# Patient Record
Sex: MALE | Race: WHITE | Employment: OTHER | URBAN - METROPOLITAN AREA
[De-identification: names, ages, dates, MRNs, and addresses within clinical notes are randomized per-mention and may not be internally consistent; named-entity substitution may affect disease eponyms.]

---

## 2017-04-12 ENCOUNTER — HOSPITAL ENCOUNTER (OUTPATIENT)
Dept: CT IMAGING | Age: 82
Discharge: HOME | End: 2017-04-12
Attending: INTERNAL MEDICINE
Payer: MEDICARE

## 2017-04-12 DIAGNOSIS — C34.91 ADENOCARCINOMA OF LUNG, RIGHT (HCC): ICD-10-CM

## 2017-04-12 DIAGNOSIS — C34.11 MALIGNANT NEOPLASM OF UPPER LOBE OF RIGHT LUNG (HCC): ICD-10-CM

## 2017-04-12 PROCEDURE — 74150 CT ABDOMEN W/O CONTRAST: CPT

## 2017-04-12 PROCEDURE — 74011000255 HC RX REV CODE- 255: Performed by: INTERNAL MEDICINE

## 2017-04-12 PROCEDURE — 71250 CT THORAX DX C-: CPT

## 2017-04-12 RX ORDER — SODIUM CHLORIDE 9 MG/ML
50 INJECTION, SOLUTION INTRAVENOUS
Status: DISCONTINUED | OUTPATIENT
Start: 2017-04-12 | End: 2017-04-12

## 2017-04-12 RX ORDER — SODIUM CHLORIDE 0.9 % (FLUSH) 0.9 %
10 SYRINGE (ML) INJECTION
Status: DISCONTINUED | OUTPATIENT
Start: 2017-04-12 | End: 2017-04-12

## 2017-04-12 RX ORDER — BARIUM SULFATE 20 MG/ML
900 SUSPENSION ORAL
Status: COMPLETED | OUTPATIENT
Start: 2017-04-12 | End: 2017-04-12

## 2017-04-12 RX ADMIN — BARIUM SULFATE 900 ML: 21 SUSPENSION ORAL at 09:00

## 2017-05-15 ENCOUNTER — HOSPITAL ENCOUNTER (OUTPATIENT)
Dept: CT IMAGING | Age: 82
Discharge: HOME | End: 2017-05-15
Attending: SPECIALIST

## 2017-05-15 DIAGNOSIS — K86.2 CYST OF PANCREAS: ICD-10-CM

## 2017-05-15 DIAGNOSIS — K86.9 DISORDER OF PANCREAS: ICD-10-CM

## 2017-05-15 DIAGNOSIS — R93.89 ABNORMAL CT SCAN: ICD-10-CM

## 2017-08-04 RX ORDER — POLYETHYLENE GLYCOL 3350, SODIUM SULFATE, SODIUM CHLORIDE, POTASSIUM CHLORIDE, ASCORBIC ACID, SODIUM ASCORBATE 7.5-2.691G
KIT ORAL
COMMUNITY
Start: 2017-07-25 | End: 2017-11-03

## 2017-08-07 ENCOUNTER — ANESTHESIA EVENT (OUTPATIENT)
Dept: ENDOSCOPY | Age: 82
End: 2017-08-07
Payer: MEDICARE

## 2017-08-07 NOTE — ANESTHESIA PREPROCEDURE EVALUATION
Anesthetic History   No history of anesthetic complications            Review of Systems / Medical History  Patient summary reviewed, nursing notes reviewed and pertinent labs reviewed    Pulmonary    COPD    Sleep apnea  Shortness of breath         Neuro/Psych         Psychiatric history    Comments: Depression Cardiovascular    Hypertension        Dysrhythmias : atrial fibrillation  Pacemaker, CAD and hyperlipidemia    Exercise tolerance: <4 METS  Comments: Electronic atrial pacemaker/defibrillator  Right bundle branch block  Left anterior fascicular block  ** Bifascicular block **     GI/Hepatic/Renal         Renal disease: CRI      Comments: Hx PANCREATIC LESION Endo/Other    Diabetes  Hypothyroidism  Obesity and anemia     Other Findings            Physical Exam    Airway  Mallampati: I    Neck ROM: normal range of motion   Mouth opening: Normal     Cardiovascular  Regular rate and rhythm,  S1 and S2 normal,  no murmur, click, rub, or gallop             Dental    Dentition: Lower partial plate and Caps/crowns     Pulmonary  Breath sounds clear to auscultation               Abdominal  GI exam deferred       Other Findings            Anesthetic Plan    ASA: 4  Anesthesia type: total IV anesthesia          Induction: Intravenous  Anesthetic plan and risks discussed with: Patient

## 2017-08-08 ENCOUNTER — ANESTHESIA (OUTPATIENT)
Dept: ENDOSCOPY | Age: 82
End: 2017-08-08
Payer: MEDICARE

## 2017-08-08 ENCOUNTER — HOSPITAL ENCOUNTER (OUTPATIENT)
Age: 82
Setting detail: OUTPATIENT SURGERY
Discharge: HOME | End: 2017-08-08
Attending: SPECIALIST | Admitting: SPECIALIST
Payer: MEDICARE

## 2017-08-08 VITALS
BODY MASS INDEX: 34.14 KG/M2 | OXYGEN SATURATION: 99 % | HEART RATE: 81 BPM | HEIGHT: 67 IN | TEMPERATURE: 98 F | DIASTOLIC BLOOD PRESSURE: 79 MMHG | WEIGHT: 217.5 LBS | SYSTOLIC BLOOD PRESSURE: 108 MMHG | RESPIRATION RATE: 30 BRPM

## 2017-08-08 PROCEDURE — 88305 TISSUE EXAM BY PATHOLOGIST: CPT | Performed by: SPECIALIST

## 2017-08-08 PROCEDURE — 76060000031 HC ANESTHESIA FIRST 0.5 HR: Performed by: SPECIALIST

## 2017-08-08 PROCEDURE — 74011250636 HC RX REV CODE- 250/636

## 2017-08-08 PROCEDURE — 74011250636 HC RX REV CODE- 250/636: Performed by: SPECIALIST

## 2017-08-08 PROCEDURE — 76040000019: Performed by: SPECIALIST

## 2017-08-08 PROCEDURE — 77030013992 HC SNR POLYP ENDOSC BSC -B: Performed by: SPECIALIST

## 2017-08-08 RX ORDER — DEXTROMETHORPHAN/PSEUDOEPHED 2.5-7.5/.8
1.2 DROPS ORAL
Status: DISCONTINUED | OUTPATIENT
Start: 2017-08-08 | End: 2017-08-08 | Stop reason: HOSPADM

## 2017-08-08 RX ORDER — SODIUM CHLORIDE 9 MG/ML
50 INJECTION, SOLUTION INTRAVENOUS CONTINUOUS
Status: DISCONTINUED | OUTPATIENT
Start: 2017-08-08 | End: 2017-08-08 | Stop reason: HOSPADM

## 2017-08-08 RX ORDER — SODIUM CHLORIDE 0.9 % (FLUSH) 0.9 %
5-10 SYRINGE (ML) INJECTION EVERY 8 HOURS
Status: DISCONTINUED | OUTPATIENT
Start: 2017-08-08 | End: 2017-08-08 | Stop reason: HOSPADM

## 2017-08-08 RX ORDER — SODIUM CHLORIDE 0.9 % (FLUSH) 0.9 %
5-10 SYRINGE (ML) INJECTION AS NEEDED
Status: DISCONTINUED | OUTPATIENT
Start: 2017-08-08 | End: 2017-08-08 | Stop reason: HOSPADM

## 2017-08-08 RX ORDER — MIDAZOLAM HYDROCHLORIDE 1 MG/ML
1-2 INJECTION, SOLUTION INTRAMUSCULAR; INTRAVENOUS
Status: DISCONTINUED | OUTPATIENT
Start: 2017-08-08 | End: 2017-08-08 | Stop reason: HOSPADM

## 2017-08-08 RX ORDER — PROPOFOL 10 MG/ML
INJECTION, EMULSION INTRAVENOUS AS NEEDED
Status: DISCONTINUED | OUTPATIENT
Start: 2017-08-08 | End: 2017-08-08 | Stop reason: HOSPADM

## 2017-08-08 RX ADMIN — PROPOFOL 20 MG: 10 INJECTION, EMULSION INTRAVENOUS at 07:45

## 2017-08-08 RX ADMIN — PROPOFOL 20 MG: 10 INJECTION, EMULSION INTRAVENOUS at 07:58

## 2017-08-08 RX ADMIN — PROPOFOL 30 MG: 10 INJECTION, EMULSION INTRAVENOUS at 07:49

## 2017-08-08 RX ADMIN — PROPOFOL 50 MG: 10 INJECTION, EMULSION INTRAVENOUS at 07:43

## 2017-08-08 RX ADMIN — SODIUM CHLORIDE 50 ML/HR: 900 INJECTION, SOLUTION INTRAVENOUS at 07:30

## 2017-08-08 RX ADMIN — SODIUM CHLORIDE 50 ML/HR: 900 INJECTION, SOLUTION INTRAVENOUS at 08:26

## 2017-08-08 RX ADMIN — PROPOFOL 20 MG: 10 INJECTION, EMULSION INTRAVENOUS at 07:55

## 2017-08-08 NOTE — IP AVS SNAPSHOT
[address] [facility] 
[phone] Patient: Kaiden Loya. MRN: [id number] VYR:8/10/[birth year]7 You are allergic to the following No active allergies Recent Documentation Height Weight BMI Smoking Status 1.702 m 98.7 kg 34.07 kg/m2 Former Smoker Emergency Contacts Name Discharge Info Relation Home Work Mobile [facility] DISCHARGE CAREGIVER [3] Daughter [21] 31 [phone] Anita Hayden DISCHARGE CAREGIVER [3] Spouse [3] [phone] About your hospitalization You were admitted on:  August 8, 2017 You last received care in the:  [facility] You were discharged on:  August 8, 2017 Unit phone number:  [phone] Why you were hospitalized Your primary diagnosis was:  Not on File Providers Seen During Your Hospitalizations Provider Role Specialty Primary office phone Jordan Echeverria MD Attending Provider Gastroenterology [phone] Your Primary Care Physician (PCP) Primary Care Physician Office Phone Office Fax [employer] [phone] Follow-up Information None Current Discharge Medication List  
  
CONTINUE these medications which have NOT CHANGED Dose & Instructions Dispensing Information Comments Morning Noon Evening Bedtime ACCU-CHEK LAMONTE PLUS TEST STRP strip Generic drug:  glucose blood VI test strips Your last dose was: Your next dose is:    
   
   
  Refills:  0  
     
   
   
   
  
 apixaban 5 mg tablet Commonly known as:  Pablo Angeles Your last dose was: Your next dose is:    
   
   
 Dose:  5 mg Take 1 Tab by mouth two (2) times a day. Quantity:  60 Tab Refills:  3  
     
   
   
   
  
 bumetanide 0.5 mg tablet Commonly known as:  Londell Dancer Your last dose was: Your next dose is: Refills:  0  
     
   
   
   
  
 colchicine 0.6 mg tablet Your last dose was: Your next dose is:    
   
   
 Dose:  0.6 mg Take 0.6 mg by mouth daily. Refills:  0  
     
   
   
   
  
 ezetimibe 10 mg tablet Commonly known as:  Starr Main Your last dose was: Your next dose is:    
   
   
 take 1 tablet by mouth once daily Quantity:  30 Tab Refills:  1 FLOMAX 0.4 mg capsule Generic drug:  tamsulosin Your last dose was: Your next dose is:    
   
   
 Dose:  0.4 mg Take 0.4 mg by mouth daily. Refills:  0 JANUVIA 50 mg tablet Generic drug:  SITagliptin Your last dose was: Your next dose is:    
   
   
 Dose:  50 mg Take 50 mg by mouth daily. Refills:  0 LEXAPRO 20 mg tablet Generic drug:  escitalopram oxalate Your last dose was: Your next dose is:    
   
   
 Dose:  20 mg Take 20 mg by mouth daily. Refills:  0 LIPITOR 20 mg tablet Generic drug:  atorvastatin Your last dose was: Your next dose is: Take  by mouth daily. Refills:  0  
     
   
   
   
  
 losartan 25 mg tablet Commonly known as:  COZAAR Your last dose was: Your next dose is:    
   
   
 Dose:  12.5 mg Take 0.5 Tabs by mouth daily. Quantity:  15 Tab Refills:  0  
     
   
   
   
  
 metoprolol succinate 25 mg XL tablet Commonly known as:  TOPROL-XL Your last dose was: Your next dose is:    
   
   
  Refills:  0 MOVIPREP 100-7.5-2.691 gram Pwpk oral solution Generic drug:  peg 3350-Electrolytes-Vit C Your last dose was: Your next dose is:    
   
   
  Refills:  0  
     
   
   
   
  
 SYNTHROID 150 mcg tablet Generic drug:  levothyroxine Your last dose was: Your next dose is:    
   
   
 Dose:  150 mcg 150 mcg daily. Refills:  0  
     
   
   
   
  
 XANAX 0.25 mg tablet Generic drug:  ALPRAZolam  
   
Your last dose was: Your next dose is: Take  by mouth nightly as needed. Refills:  0 Discharge Instructions Nilesh Chua. 
[id number] 
1935 COLON DISCHARGE INSTRUCTIONS Discomfort: 
Redness at IV site- apply warm compress to area; if redness or soreness persist- contact your physician There may be a slight amount of blood passed from the rectum Gaseous discomfort- walking, belching will help relieve any discomfort You may not operate a vehicle for 12 hours You may not engage in an occupation involving machinery or appliances for rest of today You may not drink alcoholic beverages for at least 12 hours Avoid making any critical decisions for at least 24 hour DIET: 
 Regular diet.  however -  remember your colon is empty and a heavy meal will produce gas. Avoid these foods:  vegetables, fried / greasy foods, carbonated drinks for today. MEDICATIONS: 
  
 
 Regarding Aspirin or Nonsteroidal medications, please see below. ACTIVITY: 
You may resume your normal daily activities it is recommended that you spend the remainder of the day resting -  avoid any strenuous activity. CALL M.D. ANY SIGN OF: Increasing pain, nausea, vomiting Abdominal distension (swelling) New increased bleeding (oral or rectal) Fever (chills) Tylenol as needed for pain. Follow-up Instructions: 
 Call Dr. Christy Hyde for results of procedure / biopsy in 4-5 days at telephone #  [phone] Repeat Colonoscopy 3 years Resume Eliquis in 2 days. [employer] Activation Thank you for requesting access to [employer]. Please follow the instructions below to securely access and download your online medical record.  [employer] allows you to send messages to your doctor, view your test results, renew your prescriptions, schedule appointments, and more. How Do I Sign Up? 1. In your internet browser, go to [email or web address] 
2. Click on the First Time User? Click Here link in the Sign In box. You will be redirect to the New Member Sign Up page. 3. Enter your Pop[employer] Access Code exactly as it appears below. You will not need to use this code after youve completed the sign-up process. If you do not sign up before the expiration date, you must request a new code. [employer] Access Code: [email or web address] Expires: 2017  6:28 AM (This is the date your [employer] access code will ) 4. Enter the last four digits of your Social Security Number (xxxx) and Date of Birth (mm/dd/yyyy) as indicated and click Submit. You will be taken to the next sign-up page. 5. Create a Popdeem ID. This will be your Pop[employer] login ID and cannot be changed, so think of one that is secure and easy to remember. 6. Create a Pop[employer] password. You can change your password at any time. 7. Enter your Password Reset Question and Answer. This can be used at a later time if you forget your password. 8. Enter your e-mail address. You will receive e-mail notification when new information is available in [address] 9. Click Sign Up. You can now view and download portions of your medical record. 10. Click the Download Summary menu link to download a portable copy of your medical information. Additional Information If you have questions, please visit the Frequently Asked Questions section of the [employer] website at [email or web address] Remember, [employer] is NOT to be used for urgent needs. For medical emergencies, dial 911. Discharge Orders None Introducing [facility]! Chao Ochoa introduces [employer] patient portal. Now you can access parts of your medical record, email your doctor's office, and request medication refills online.    
 
1. In your internet browser, go to [email or web address] 2. Click on the First Time User? Click Here link in the Sign In box. You will see the New Member Sign Up page. 3. Enter your [employer] Access Code exactly as it appears below. You will not need to use this code after youve completed the sign-up process. If you do not sign up before the expiration date, you must request a new code. · [employer] Access Code: [email or web address] Expires: 11/6/2017  6:28 AM 
 
4. Enter the last four digits of your Social Security Number (xxxx) and Date of Birth (mm/dd/yyyy) as indicated and click Submit. You will be taken to the next sign-up page. 5. Create a [employer] ID. This will be your [employer] login ID and cannot be changed, so think of one that is secure and easy to remember. 6. Create a [employer] password. You can change your password at any time. 7. Enter your Password Reset Question and Answer. This can be used at a later time if you forget your password. 8. Enter your e-mail address. You will receive e-mail notification when new information is available in [address] 9. Click Sign Up. You can now view and download portions of your medical record. 10. Click the Download Summary menu link to download a portable copy of your medical information. If you have questions, please visit the Frequently Asked Questions section of the [employer] website. Remember, [employer] is NOT to be used for urgent needs. For medical emergencies, dial 911. Now available from your iPhone and Android! General Information Please provide this summary of care documentation to your next provider. Patient Signature:  ____________________________________________________________ Date:  ____________________________________________________________  
  
Wendy Fines Provider Signature:  ____________________________________________________________ Date:  ____________________________________________________________

## 2017-08-08 NOTE — ANESTHESIA POSTPROCEDURE EVALUATION
Post-Anesthesia Evaluation and Assessment    Patient: Kaiden Loya. MRN: [id number]  SSN: [id number]    YOB: 1935  Age: 80 y.o. Sex: male       Cardiovascular Function/Vital Signs  Visit Vitals    BP 94/54    Pulse 80    Temp 36.4 °C (97.5 °F)    Resp 20    Ht 5' 7\" (1.702 m)    Wt 98.7 kg (217 lb 8 oz)    SpO2 99%    BMI 34.07 kg/m2       Patient is status post total IV anesthesia, general anesthesia for Procedure(s):  COLONOSCOPY  ENDOSCOPIC POLYPECTOMY. Nausea/Vomiting: None    Postoperative hydration reviewed and adequate. Pain:  Pain Scale 1: Visual (08/08/17 0805)  Pain Intensity 1: 0 (08/08/17 0805)   Managed    Neurological Status: At baseline    Mental Status and Level of Consciousness: Arousable    Pulmonary Status:   O2 Device: Room air (08/08/17 0805)   Adequate oxygenation and airway patent    Complications related to anesthesia: None    Post-anesthesia assessment completed.  No concerns    Signed By: Tano Minor MD     August 8, 2017

## 2017-08-08 NOTE — PROGRESS NOTES
Lyle Ponce.  1935  [id number]    Situation:  Verbal report received from: Blossom Camejo RN  Procedure: Procedure(s):  COLONOSCOPY  ENDOSCOPIC POLYPECTOMY    Background:    Preoperative diagnosis: ANEMIA, OCCULT BLOOD IN STOOLS, PERS HX COLONIC POLYPS, ELEVATED CEA  Postoperative diagnosis: Colon polyps    :  Dr. Bradford [place]  Assistant(s): Endoscopy Technician-1: [facility]  Endoscopy RN-1: Helena Isabel RN    Specimens:   ID Type Source Tests Collected by Time Destination   1 : Transverse colon polyp Preservative Colon, Transverse  Pietro Villa MD 8/8/2017 8633 Pathology   2 : Rectum polyp Preservative Colon, Ascending  Pietro Villa MD 8/8/2017 2172 Pathology     H. Pylori  no    Assessment:  Intra-procedure medications   Anesthesia gave intra-procedure sedation and medications, see anesthesia flow sheet  yes    Intravenous fluids: NS@ KVO     Vital signs stable  yes    Abdominal assessment: round and soft yes    Recommendation:  Discharge patient per MD order  yes.   Return to floor  NA  Family or Friend  Yes  Permission to share finding with family or friend  Yes

## 2017-08-08 NOTE — DISCHARGE INSTRUCTIONS
Alida Altamirano  [id number]  1935    COLON DISCHARGE INSTRUCTIONS  Discomfort:  Redness at IV site- apply warm compress to area; if redness or soreness persist- contact your physician  There may be a slight amount of blood passed from the rectum  Gaseous discomfort- walking, belching will help relieve any discomfort  You may not operate a vehicle for 12 hours  You may not engage in an occupation involving machinery or appliances for rest of today  You may not drink alcoholic beverages for at least 12 hours  Avoid making any critical decisions for at least 24 hour  DIET:   Regular diet. - however -  remember your colon is empty and a heavy meal will produce gas. Avoid these foods:  vegetables, fried / greasy foods, carbonated drinks for today. MEDICATIONS:        Regarding Aspirin or Nonsteroidal medications, please see below. ACTIVITY:  You may resume your normal daily activities it is recommended that you spend the remainder of the day resting -  avoid any strenuous activity. CALL M.D. ANY SIGN OF:  Increasing pain, nausea, vomiting  Abdominal distension (swelling)  New increased bleeding (oral or rectal)  Fever (chills)    Tylenol as needed for pain. Follow-up Instructions:   Call Dr. Pamela Estrella for results of procedure / biopsy in 4-5 days at telephone #  [phone]              Repeat Colonoscopy 3 years              Resume Eliquis in 2 days. [employer] Activation    Thank you for requesting access to [employer]. Please follow the instructions below to securely access and download your online medical record. [employer] allows you to send messages to your doctor, view your test results, renew your prescriptions, schedule appointments, and more. How Do I Sign Up? 1. In your internet browser, go to [email or web address]  2. Click on the First Time User? Click Here link in the Sign In box. You will be redirect to the New Member Sign Up page.   3. Enter your [employer] Access Code exactly as it appears below. You will not need to use this code after youve completed the sign-up process. If you do not sign up before the expiration date, you must request a new code. [employer] Access Code: [email or web address]  Expires: 2017  6:28 AM (This is the date your [employer] access code will )    4. Enter the last four digits of your Social Security Number (xxxx) and Date of Birth (mm/dd/yyyy) as indicated and click Submit. You will be taken to the next sign-up page. 5. Create a [employer] ID. This will be your [employer] login ID and cannot be changed, so think of one that is secure and easy to remember. 6. Create a [employer] password. You can change your password at any time. 7. Enter your Password Reset Question and Answer. This can be used at a later time if you forget your password. 8. Enter your e-mail address. You will receive e-mail notification when new information is available in [address] 9. Click Sign Up. You can now view and download portions of your medical record. 10. Click the Download Summary menu link to download a portable copy of your medical information. Additional Information    If you have questions, please visit the Frequently Asked Questions section of the [employer] website at [email or web address] Remember, [employer] is NOT to be used for urgent needs. For medical emergencies, dial 911.

## 2017-08-08 NOTE — PROCEDURES
Colonoscopy Procedure Note    Indications:   Personal history of colon polyps (screening only)    Referring Physician: Ivory Boyer MD  Anesthesia/Sedation: MAC anesthesia Propofol  Endoscopist:  Dr. Tank Lo    Procedure in Detail:  Informed consent was obtained for the procedure, including sedation. Risks of perforation, hemorrhage, adverse drug reaction, and aspiration were discussed. The patient was placed in the left lateral decubitus position. Based on the pre-procedure assessment, including review of the patient's medical history, medications, allergies, and review of systems, he had been deemed to be an appropriate candidate for moderate sedation; he was therefore sedated with the medications listed above. The patient was monitored continuously with ECG tracing, pulse oximetry, blood pressure monitoring, and direct observations. A rectal examination was performed. The [id number] was inserted into the rectum and advanced under direct vision to the cecum, which was identified by the ileocecal valve and appendiceal orifice. The quality of the colonic preparation was adequate. A careful inspection was made as the colonoscope was withdrawn, including a retroflexed view of the rectum; findings and interventions are described below. Appropriate photodocumentation was obtained. Findings:     1. Scope advanced to the cecum. 2.  Preparation was adequate. 3.  + small polyps removed with cold snare as below:   4 mm in transverse s/p cold snare removal   5 mm in rectum s/p cold snare removal  4. Diffuse diverticulosis. Therapies:  As above    Specimen: Specimens were collected as described above and sent to pathology. Complications: None were encountered during the procedure. EBL: < 10 ml.     Recommendations:   -f/u path  -repeat colonoscopy in 3 years  Signed By: Solange Almeida MD                        August 8, 2017

## 2017-08-08 NOTE — PERIOP NOTES
Anesthesia reports 140mg Propofol and 600mL NS given during procedure. Received report from anesthesia staff on vital signs and status of patient.

## 2017-11-03 RX ORDER — ACETAMINOPHEN 500 MG
5000 TABLET ORAL DAILY
COMMUNITY

## 2017-11-06 ENCOUNTER — ANESTHESIA EVENT (OUTPATIENT)
Dept: ENDOSCOPY | Age: 82
End: 2017-11-06
Payer: MEDICARE

## 2017-11-06 NOTE — ANESTHESIA PREPROCEDURE EVALUATION
Anesthetic History   No history of anesthetic complications            Review of Systems / Medical History  Patient summary reviewed, nursing notes reviewed and pertinent labs reviewed    Pulmonary    COPD  Recent URI (No fever or chills, does have some clear drainage, cough)  Sleep apnea: CPAP        Comments: RULobectomy for Ca in 2014   Neuro/Psych         Psychiatric history (Depression)     Cardiovascular    Hypertension        Dysrhythmias (SSS) : atrial fibrillation and atrial flutter  Pacemaker (AICD 2010 for fib/flutter), CAD and hyperlipidemia    Exercise tolerance: <4 METS     GI/Hepatic/Renal         Renal disease (CKD)      Comments: Abnormal CT, pancreatic cyst with CEA elevation in cyst aspirate, neg mucin stain; Fam hx of GT tract CA Endo/Other    Diabetes: type 2  Hypothyroidism  Obesity and arthritis  Pertinent negatives: Cancer: Rt lung Ca with RULobectomy 2014.    Other Findings   Comments: Gout  Neuropathy in feet         Physical Exam    Airway  Mallampati: II  TM Distance: 4 - 6 cm  Neck ROM: normal range of motion        Cardiovascular  Regular rate and rhythm,  S1 and S2 normal,  no murmur, click, rub, or gallop             Dental    Dentition: Lower partial plate     Pulmonary  Breath sounds clear to auscultation               Abdominal  GI exam deferred       Other Findings            Anesthetic Plan    ASA: 4  Anesthesia type: total IV anesthesia          Induction: Intravenous  Anesthetic plan and risks discussed with: Patient      141 Glucose

## 2017-11-07 ENCOUNTER — ANESTHESIA (OUTPATIENT)
Dept: ENDOSCOPY | Age: 82
End: 2017-11-07
Payer: MEDICARE

## 2017-11-07 ENCOUNTER — HOSPITAL ENCOUNTER (OUTPATIENT)
Age: 82
Setting detail: OUTPATIENT SURGERY
Discharge: HOME | End: 2017-11-07
Attending: INTERNAL MEDICINE | Admitting: INTERNAL MEDICINE
Payer: MEDICARE

## 2017-11-07 VITALS
RESPIRATION RATE: 24 BRPM | TEMPERATURE: 97.8 F | HEIGHT: 67 IN | DIASTOLIC BLOOD PRESSURE: 85 MMHG | OXYGEN SATURATION: 100 % | HEART RATE: 80 BPM | BODY MASS INDEX: 35.07 KG/M2 | SYSTOLIC BLOOD PRESSURE: 139 MMHG | WEIGHT: 223.44 LBS

## 2017-11-07 LAB
CEA FLD-MCNC: 543.3 NG/ML
SPECIMEN SOURCE FLD: NORMAL

## 2017-11-07 PROCEDURE — 77030022853 HC NDL ASPIR ULTRSND BSC -C: Performed by: INTERNAL MEDICINE

## 2017-11-07 PROCEDURE — 74011250636 HC RX REV CODE- 250/636

## 2017-11-07 PROCEDURE — 74011250636 HC RX REV CODE- 250/636: Performed by: INTERNAL MEDICINE

## 2017-11-07 PROCEDURE — 77030019957 HC CUF BLN GASTSCP OCOA -B: Performed by: INTERNAL MEDICINE

## 2017-11-07 PROCEDURE — 76040000007: Performed by: INTERNAL MEDICINE

## 2017-11-07 PROCEDURE — 82378 CARCINOEMBRYONIC ANTIGEN: CPT | Performed by: INTERNAL MEDICINE

## 2017-11-07 PROCEDURE — 77030003406 HC NDL ASPIR BIOP OCOA -C: Performed by: INTERNAL MEDICINE

## 2017-11-07 PROCEDURE — 74011000250 HC RX REV CODE- 250

## 2017-11-07 PROCEDURE — 76060000032 HC ANESTHESIA 0.5 TO 1 HR: Performed by: INTERNAL MEDICINE

## 2017-11-07 RX ORDER — PROPOFOL 10 MG/ML
INJECTION, EMULSION INTRAVENOUS
Status: DISCONTINUED | OUTPATIENT
Start: 2017-11-07 | End: 2017-11-07

## 2017-11-07 RX ORDER — PROPOFOL 10 MG/ML
INJECTION, EMULSION INTRAVENOUS AS NEEDED
Status: DISCONTINUED | OUTPATIENT
Start: 2017-11-07 | End: 2017-11-07 | Stop reason: HOSPADM

## 2017-11-07 RX ORDER — MIDAZOLAM HYDROCHLORIDE 1 MG/ML
1-2 INJECTION, SOLUTION INTRAMUSCULAR; INTRAVENOUS
Status: DISCONTINUED | OUTPATIENT
Start: 2017-11-07 | End: 2017-11-07 | Stop reason: HOSPADM

## 2017-11-07 RX ORDER — EPINEPHRINE 0.1 MG/ML
1 INJECTION INTRACARDIAC; INTRAVENOUS
Status: DISCONTINUED | OUTPATIENT
Start: 2017-11-07 | End: 2017-11-07 | Stop reason: HOSPADM

## 2017-11-07 RX ORDER — LIDOCAINE HYDROCHLORIDE 20 MG/ML
INJECTION, SOLUTION EPIDURAL; INFILTRATION; INTRACAUDAL; PERINEURAL AS NEEDED
Status: DISCONTINUED | OUTPATIENT
Start: 2017-11-07 | End: 2017-11-07 | Stop reason: HOSPADM

## 2017-11-07 RX ORDER — SODIUM CHLORIDE 9 MG/ML
75 INJECTION, SOLUTION INTRAVENOUS CONTINUOUS
Status: DISCONTINUED | OUTPATIENT
Start: 2017-11-07 | End: 2017-11-07 | Stop reason: HOSPADM

## 2017-11-07 RX ORDER — NALOXONE HYDROCHLORIDE 0.4 MG/ML
0.4 INJECTION, SOLUTION INTRAMUSCULAR; INTRAVENOUS; SUBCUTANEOUS
Status: DISCONTINUED | OUTPATIENT
Start: 2017-11-07 | End: 2017-11-07 | Stop reason: HOSPADM

## 2017-11-07 RX ORDER — PROPOFOL 10 MG/ML
INJECTION, EMULSION INTRAVENOUS AS NEEDED
Status: DISCONTINUED | OUTPATIENT
Start: 2017-11-07 | End: 2017-11-07

## 2017-11-07 RX ORDER — PROPOFOL 10 MG/ML
INJECTION, EMULSION INTRAVENOUS
Status: DISCONTINUED | OUTPATIENT
Start: 2017-11-07 | End: 2017-11-07 | Stop reason: HOSPADM

## 2017-11-07 RX ORDER — SODIUM CHLORIDE 0.9 % (FLUSH) 0.9 %
5-10 SYRINGE (ML) INJECTION AS NEEDED
Status: DISCONTINUED | OUTPATIENT
Start: 2017-11-07 | End: 2017-11-07 | Stop reason: HOSPADM

## 2017-11-07 RX ORDER — CIPROFLOXACIN 250 MG/1
250 TABLET, FILM COATED ORAL EVERY 12 HOURS
Qty: 6 TAB | Refills: 0 | Status: SHIPPED | OUTPATIENT
Start: 2017-11-07 | End: 2017-11-10

## 2017-11-07 RX ORDER — DEXTROMETHORPHAN/PSEUDOEPHED 2.5-7.5/.8
1.2 DROPS ORAL
Status: DISCONTINUED | OUTPATIENT
Start: 2017-11-07 | End: 2017-11-07 | Stop reason: HOSPADM

## 2017-11-07 RX ORDER — SODIUM CHLORIDE 0.9 % (FLUSH) 0.9 %
5-10 SYRINGE (ML) INJECTION EVERY 8 HOURS
Status: DISCONTINUED | OUTPATIENT
Start: 2017-11-07 | End: 2017-11-07 | Stop reason: HOSPADM

## 2017-11-07 RX ORDER — MIDAZOLAM HYDROCHLORIDE 1 MG/ML
.25-5 INJECTION, SOLUTION INTRAMUSCULAR; INTRAVENOUS
Status: DISCONTINUED | OUTPATIENT
Start: 2017-11-07 | End: 2017-11-07 | Stop reason: HOSPADM

## 2017-11-07 RX ORDER — LEVOFLOXACIN 5 MG/ML
250 INJECTION, SOLUTION INTRAVENOUS ONCE
Status: COMPLETED | OUTPATIENT
Start: 2017-11-07 | End: 2017-11-07

## 2017-11-07 RX ORDER — GLYCOPYRROLATE 0.2 MG/ML
INJECTION INTRAMUSCULAR; INTRAVENOUS AS NEEDED
Status: DISCONTINUED | OUTPATIENT
Start: 2017-11-07 | End: 2017-11-07 | Stop reason: HOSPADM

## 2017-11-07 RX ORDER — ATROPINE SULFATE 0.1 MG/ML
0.5 INJECTION INTRAVENOUS
Status: DISCONTINUED | OUTPATIENT
Start: 2017-11-07 | End: 2017-11-07 | Stop reason: HOSPADM

## 2017-11-07 RX ORDER — FLUMAZENIL 0.1 MG/ML
0.2 INJECTION INTRAVENOUS
Status: DISCONTINUED | OUTPATIENT
Start: 2017-11-07 | End: 2017-11-07 | Stop reason: HOSPADM

## 2017-11-07 RX ADMIN — LEVOFLOXACIN 250 MG: 5 INJECTION, SOLUTION INTRAVENOUS at 07:52

## 2017-11-07 RX ADMIN — LIDOCAINE HYDROCHLORIDE 100 MG: 20 INJECTION, SOLUTION EPIDURAL; INFILTRATION; INTRACAUDAL; PERINEURAL at 07:41

## 2017-11-07 RX ADMIN — SODIUM CHLORIDE 75 ML/HR: 900 INJECTION, SOLUTION INTRAVENOUS at 07:30

## 2017-11-07 RX ADMIN — PROPOFOL 25 MCG/KG/MIN: 10 INJECTION, EMULSION INTRAVENOUS at 07:44

## 2017-11-07 RX ADMIN — PROPOFOL 70 MG: 10 INJECTION, EMULSION INTRAVENOUS at 08:05

## 2017-11-07 RX ADMIN — GLYCOPYRROLATE 0.1 MG: 0.2 INJECTION INTRAMUSCULAR; INTRAVENOUS at 07:41

## 2017-11-07 NOTE — H&P
Pre-endoscopy H and P    The patient was seen and examined in the room/pre-op holding area. The airway was assessed and documented. The problem list, past medical history, and medications were reviewed. Patient Active Problem List   Diagnosis Code    Arrhythmia Atrial Flutter I48.92    Bradycardia Sick Sinus Syndrome I49.5    CAD (coronary artery disease), native coronary artery I25.10    DM (Diabetes Mellitus-Adult Onset) E11.9    First degree atrioventricular block I44.0    Right bundle branch block and left anterior fascicular block I45.2    Benign hypertensive heart disease without heart failure I11.9    Paroxysmal atrial fibrillation (HCC) I48.0    Sleep apnea G47.30    Cardiac pacemaker in situ Z95.0    Occlusion and stenosis of carotid artery without mention of cerebral infarction I65.29    Lung nodules R91.8    HTN (hypertension) I10    CKD (chronic kidney disease) stage 3, GFR 30-59 ml/min N18.3    Hyperkalemia E87.5    Respiratory failure, post-operative (HCC) J95.821    Anemia, blood loss M00.4    Metabolic acidosis, normal anion gap (NAG) E87.2    COPD (chronic obstructive pulmonary disease) ([facility]) J44.9    SAMIR (obstructive sleep apnea) G47.33    Adenocarcinoma of lung (HCC) C34.90    HEMALATHA (acute kidney injury) ([facility] [address] N17.9    Thrombocytopenia (HCC) D69.6     Social History     Social History    Marital status:      Spouse name: N/A    Number of children: N/A    Years of education: N/A     Occupational History    Not on file.      Social History Main Topics    Smoking status: Former Smoker     Packs/day: 2.50     Years: 25.00     Types: Cigarettes     Quit date: 6/16/1975    Smokeless tobacco: Never Used      Comment: quit 1977    Alcohol use No    Drug use: No    Sexual activity: Not on file     Other Topics Concern    Not on file     Social History Narrative     Past Medical History:   Diagnosis Date    Arrhythmia     AFib    Arthritis     Atrial fibrillation ([facility] [address]     Cancer ([facility] [address]     right lung    Chronic kidney disease     Diabetes ([facility]     NIDDM    Gout     Hypertension     Hypothyroidism     Ill-defined condition     neuropathy in feet    Other ill-defined conditions(799.89)     high cholesterol    Pacemaker     Psychiatric disorder     depression    Sleep apnea     uses CPAP         Prior to Admission Medications   Prescriptions Last Dose Informant Patient Reported? Taking? ACCU-CHEK LAMONTE PLUS TEST STRP strip 2017 at Unknown time Self Yes Yes   Cholecalciferol, Vitamin D3, (VITAMIN D3) 2,000 unit cap capsule 2017 at Unknown time  Yes Yes   Sig: Take 2,000 Units by mouth daily. SYNTHROID 150 mcg tablet 2017 at Unknown time Self Yes Yes   Si mcg daily. alprazolam (XANAX) 0.25 mg tablet 2017 at Unknown time Self Yes Yes   Sig: Take 0.25 mg by mouth nightly as needed. apixaban (ELIQUIS) 5 mg tablet 2017  No Yes   Sig: Take 1 Tab by mouth two (2) times a day. atorvastatin (LIPITOR) 20 mg tablet 2017 at Unknown time Self Yes Yes   Sig: Take 20 mg by mouth daily. bumetanide (BUMEX) 0.5 mg tablet 2017 at Unknown time Self Yes Yes   Sig: Take 0.5 mg by mouth daily. colchicine 0.6 mg tablet Unknown at Unknown time Self Yes No   Sig: Take 0.6 mg by mouth daily as needed. escitalopram (LEXAPRO) 20 mg tablet 2017 at Unknown time Self Yes Yes   Sig: Take 20 mg by mouth daily. ezetimibe (ZETIA) 10 mg tablet 2017 at Unknown time Self No Yes   Sig: take 1 tablet by mouth once daily   losartan (COZAAR) 25 mg tablet 2017 at Unknown time  No Yes   Sig: Take 0.5 Tabs by mouth daily. Patient taking differently: Take 25 mg by mouth daily. metoprolol succinate (TOPROL-XL) 25 mg XL tablet 2017 at Unknown time Self Yes Yes   Sig: Take 25 mg by mouth daily. sitagliptin (JANUVIA) 50 mg tablet 2017 at Unknown time Self Yes Yes   Sig: Take 50 mg by mouth daily.    tamsulosin [facility]) 0.4 mg capsule 11/6/2017 at Unknown time  Yes Yes   Sig: Take 0.4 mg by mouth daily. Facility-Administered Medications: None           The review of systems is:  Negative  for shortness of breath or chest pain      The heart, lungs, and mental status were satisfactory for the administration of deep sedation and for the procedure. I discussed with the patient the objectives, risks, consequences and alternatives to the procedure.       Aristides Hadley MD  11/7/2017  7:35 AM

## 2017-11-07 NOTE — IP AVS SNAPSHOT
[address] [facility] 
[phone] Patient: Darylene Hurry. MRN: [id number] MJO:[id number] About your hospitalization You were admitted on:  November 7, 2017 You last received care in the:  [facility] ENDOSCOPY You were discharged on:  November 7, 2017 Why you were hospitalized Your primary diagnosis was:  Not on File Discharge Orders None A check roro indicates which time of day the medication should be taken. My Medications TAKE these medications as instructed Instructions Each Dose to Equal  
 Morning Noon Evening Bedtime ACCU-CHEK LAMONTE PLUS TEST STRP strip Generic drug:  glucose blood VI test strips Your last dose was: Your next dose is:    
   
   
      
   
   
   
  
 apixaban 5 mg tablet Commonly known as:  Wonda Deem Your last dose was: Your next dose is: Take 1 Tab by mouth two (2) times a day. 5 mg  
    
   
   
   
  
 bumetanide 0.5 mg tablet Commonly known as:  Ana Lilia Harms Your last dose was: Your next dose is: Take 0.5 mg by mouth daily. 0.5 mg Cholecalciferol (Vitamin D3) 2,000 unit Cap capsule Commonly known as:  VITAMIN D3 Your last dose was: Your next dose is: Take 2,000 Units by mouth daily. 2000 Units  
    
   
   
   
  
 ciprofloxacin HCl 250 mg tablet Commonly known as:  CIPRO Your last dose was: Your next dose is: Take 1 Tab by mouth every twelve (12) hours for 3 days. 250 mg  
    
   
   
   
  
 colchicine 0.6 mg tablet Your last dose was: Your next dose is: Take 0.6 mg by mouth daily as needed. 0.6 mg  
    
   
   
   
  
 ezetimibe 10 mg tablet Commonly known as:  Levorn Block Your last dose was: Your next dose is: take 1 tablet by mouth once daily FLOMAX 0.4 mg capsule Generic drug:  tamsulosin Your last dose was: Your next dose is: Take 0.4 mg by mouth daily. 0.4 mg  
    
   
   
   
  
 JANUVIA 50 mg tablet Generic drug:  SITagliptin Your last dose was: Your next dose is: Take 50 mg by mouth daily. 50 mg  
    
   
   
   
  
 LEXAPRO 20 mg tablet Generic drug:  escitalopram oxalate Your last dose was: Your next dose is: Take 20 mg by mouth daily. 20 mg  
    
   
   
   
  
 LIPITOR 20 mg tablet Generic drug:  atorvastatin Your last dose was: Your next dose is: Take 20 mg by mouth daily. 20 mg  
    
   
   
   
  
 losartan 25 mg tablet Commonly known as:  COZAAR Your last dose was: Your next dose is: Take 0.5 Tabs by mouth daily. 12.5 mg  
    
   
   
   
  
 metoprolol succinate 25 mg XL tablet Commonly known as:  TOPROL-XL Your last dose was: Your next dose is: Take 25 mg by mouth daily. 25 mg SYNTHROID 150 mcg tablet Generic drug:  levothyroxine Your last dose was: Your next dose is:    
   
   
 150 mcg daily. 150 mcg XANAX 0.25 mg tablet Generic drug:  ALPRAZolam  
   
Your last dose was: Your next dose is: Take 0.25 mg by mouth nightly as needed. 0.25 mg Where to Get Your Medications Information on where to get these meds will be given to you by the nurse or doctor. ! Ask your nurse or doctor about these medications  
  ciprofloxacin HCl 250 mg tablet Discharge Instructions [place] Office: [phone] Abigail Moise 
[phone] 
1935 EGD/COLONOSCOPY DISCHARGE INSTRUCTIONS Discomfort: Sore throat- throat lozenges or warm salt water gargle 
redness at IV site- apply warm compress to area; if redness or soreness persist- contact your physician Gaseous discomfort- walking, belching will help relieve any discomfort You may not operate a vehicle for 12 hours You may not engage in an occupation involving machinery or appliances for rest of today. You may not drink alcoholic beverages for at least 12 hours Avoid making any critical decisions for at least 24 hour DIET You may resume your regular diet  however -  remember your colon is empty and a heavy meal will produce gas. Avoid these foods:  fried / greasy foods, excessive carbonated drinks or too much caffeine MEDICATIONS Regarding Aspirin or Nonsteroidal medications specifically, please see below. ACTIVITY You may resume your normal daily activities. Spend the remainder of the day resting -  avoid any strenuous activity. CALL M.D. ANY SIGN OF Increasing pain, nausea, vomiting Abdominal distension (swelling) New increased bleeding (oral or rectal) Fever (chills) Pain in chest area Bloody discharge from nose or mouth Shortness of breath You may not take any Advil, Aspirin, Ibuprofen, Motrin, Aleve, or Goodys for 7 days, ONLY  Tylenol as needed for pain. Follow-up Instructions: 
 Call  Farhad Srinivasan MD for any questions or concerns Results of procedure / biopsy in 7 days Telephone # [phone] Follow-up Information None [facility] Announcement We are excited to announce that we are making your provider's discharge notes available to you in [place]. You will see these notes when they are completed and signed by the physician that discharged you from your recent hospital stay.   If you have any questions or concerns about any information you see in [place], please call the Health Information Department where you were seen or reach out to your Primary Care Provider for more information about your plan of care. Introducing \[facility] [place] [facility]\"" & HEALTH SERVICES! Radha Dempsey introduces [employer] patient portal. Now you can access parts of your medical record, email your doctor's office, and request medication refills online. 1. In your internet browser, go to [email or web address] 2. Click on the First Time User? Click Here link in the Sign In box. You will see the New Member Sign Up page. 3. Enter your K[employer]vel Access Code exactly as it appears below. You will not need to use this code after youve completed the sign-up process. If you do not sign up before the expiration date, you must request a new code. · K[employer]vel Access Code: [email or web address] Expires: 2/5/2018  6:42 AM 
 
4. Enter the last four digits of your Social Security Number (xxxx) and Date of Birth (mm/dd/yyyy) as indicated and click Submit. You will be taken to the next sign-up page. 5. Create a Knovel ID. This will be your Knovel login ID and cannot be changed, so think of one that is secure and easy to remember. 6. Create a Knovel password. You can change your password at any time. 7. Enter your Password Reset Question and Answer. This can be used at a later time if you forget your password. 8. Enter your e-mail address. You will receive e-mail notification when new information is available in [address] 9. Click Sign Up. You can now view and download portions of your medical record. 10. Click the Download Summary menu link to download a portable copy of your medical information. If you have questions, please visit the Frequently Asked Questions section of the [employer]vel website. Remember, [employer]vel is NOT to be used for urgent needs. For medical emergencies, dial 911. Now available from your iPhone and Android! Providers Seen During Your Hospitalization Provider Specialty Primary office phone Luis Antonio Chambers MD Gastroenterology [phone] Your Primary Care Physician (PCP) Primary Care Physician Office Phone Office Fax Franky Faith [phone] You are allergic to the following No active allergies Recent Documentation Height Weight BMI Smoking Status 1.702 m 101.4 kg 35 kg/m2 Former Smoker Emergency Contacts Name Discharge Info Relation Home Work Mobile [facility] DISCHARGE CAREGIVER [3] Daughter [21] [phone] Anita Hayden DISCHARGE CAREGIVER [3] Spouse [3] [phone] Patient Belongings The following personal items are in your possession at time of discharge: 
  Dental Appliances: Partials (lower partial denture)  Visual Aid: None Please provide this summary of care documentation to your next provider. Signatures-by signing, you are acknowledging that this After Visit Summary has been reviewed with you and you have received a copy. Patient Signature:  ____________________________________________________________ Date:  ____________________________________________________________  
  
Alie Hammonds Provider Signature:  ____________________________________________________________ Date:  ____________________________________________________________

## 2017-11-07 NOTE — ANESTHESIA POSTPROCEDURE EVALUATION
Post-Anesthesia Evaluation and Assessment    Patient: Lindsey Oreilly MRN: [id number]  SSN: [id number]    YOB: 1935  Age: [de-identified] y.o. Sex: male       Cardiovascular Function/Vital Signs  Visit Vitals    /85    Pulse 80    Temp 36.6 °C (97.8 °F)    Resp 24    Ht 5' 7\" (1.702 m)    Wt 101.4 kg (223 lb 7 oz)    SpO2 100%    BMI 35 kg/m2       Patient is status post total IV anesthesia anesthesia for Procedure(s):  ENDOSCOPIC ULTRASOUND (EUS)  FINE NEEDLE ASPIRATION. Nausea/Vomiting: None    Postoperative hydration reviewed and adequate. Pain:  Pain Scale 1: Numeric (0 - 10) (11/07/17 0846)  Pain Intensity 1: 0 (11/07/17 0846)   Managed    Neurological Status: At baseline    Mental Status and Level of Consciousness: Arousable    Pulmonary Status:   O2 Device: Room air (11/07/17 0846)   Adequate oxygenation and airway patent    Complications related to anesthesia: None    Post-anesthesia assessment completed.  No concerns    Signed By: Godfrey Herring DO     November 7, 2017

## 2017-11-07 NOTE — DISCHARGE INSTRUCTIONS
[place] Office: [phone]    Cecelia Moran  [id number]  1935    EGD/COLONOSCOPY DISCHARGE INSTRUCTIONS  Discomfort:  Sore throat- throat lozenges or warm salt water gargle  redness at IV site- apply warm compress to area; if redness or soreness persist- contact your physician  Gaseous discomfort- walking, belching will help relieve any discomfort  You may not operate a vehicle for 12 hours  You may not engage in an occupation involving machinery or appliances for rest of today. You may not drink alcoholic beverages for at least 12 hours  Avoid making any critical decisions for at least 24 hour  DIET  You may resume your regular diet - however -  remember your colon is empty and a heavy meal will produce gas. Avoid these foods:  fried / greasy foods, excessive carbonated drinks or too much caffeine  MEDICATIONS   Regarding Aspirin or Nonsteroidal medications specifically, please see below. ACTIVITY  You may resume your normal daily activities. Spend the remainder of the day resting -  avoid any strenuous activity. CALL M.D. ANY SIGN OF   Increasing pain, nausea, vomiting  Abdominal distension (swelling)  New increased bleeding (oral or rectal)  Fever (chills)  Pain in chest area  Bloody discharge from nose or mouth  Shortness of breath    You may not take any Advil, Aspirin, Ibuprofen, Motrin, Aleve, or Goodys for 7 days, ONLY  Tylenol as needed for pain. Follow-up Instructions:   Call  Farhad Rollins MD for any questions or concerns  Results of procedure / biopsy in 7 days   Telephone # [phone]      Follow-up Information     None

## 2017-11-07 NOTE — PROGRESS NOTES
3291: :FNA (x1 pass) completed by Dr. Radha Waddell into pancreatic cyst.    0402: Fe Lock from pathology here to obtain specimens. Approximately x1 mL of fluid aspirated from cyst, per Dr. Radha Waddell, and given to Fe Lock for ordered testing.

## 2017-11-07 NOTE — DISCHARGE SUMMARY
[place] Office: [phone]    Timothy Shahid  [id number]  1935    EGD/COLONOSCOPY DISCHARGE INSTRUCTIONS  Discomfort:  Sore throat- throat lozenges or warm salt water gargle  redness at IV site- apply warm compress to area; if redness or soreness persist- contact your physician  Gaseous discomfort- walking, belching will help relieve any discomfort  You may not operate a vehicle for 12 hours  You may not engage in an occupation involving machinery or appliances for rest of today. You may not drink alcoholic beverages for at least 12 hours  Avoid making any critical decisions for at least 24 hour  DIET  You may resume your regular diet  however -  remember your colon is empty and a heavy meal will produce gas. Avoid these foods:  fried / greasy foods, excessive carbonated drinks or too much caffeine  MEDICATIONS   Regarding Aspirin or Nonsteroidal medications specifically, please see below. ACTIVITY  You may resume your normal daily activities. Spend the remainder of the day resting -  avoid any strenuous activity. CALL M.D. ANY SIGN OF   Increasing pain, nausea, vomiting  Abdominal distension (swelling)  New increased bleeding (oral or rectal)  Fever (chills)  Pain in chest area  Bloody discharge from nose or mouth  Shortness of breath    You may not take any Advil, Aspirin, Ibuprofen, Motrin, Aleve, or Goodys for 7 days, ONLY  Tylenol as needed for pain. Follow-up Instructions:   Call  Farhad Lynne Sa, MD for any questions or concerns  Results of procedure / biopsy in 7 days   Telephone # [phone]      Follow-up Information     None

## 2017-11-07 NOTE — PROCEDURES
NAME:  Jacob Holliday. :   1935   MRN:   [id number]     NAUN CLEMENS [facility]    Date/Time:  2017   Procedure Type: EUS FNA of pancreatic cyst    Indications: Pancreatic cyst    Pre-operative Diagnosis: see indication above    Post-operative Diagnosis:  See findings below    : Peggy Garces MD    Referring Provider: Kareem Peters MD    Procedure Details:    Exam:  Airway: clear, no airway problems anticipated  Heart: RRR, without gallops or rubs  Lungs: clear bilaterally without wheezes, crackles, or rhonchi  Abdomen: soft, nontender, nondistended, bowel sounds present  Mental Status: awake, alert and oriented to person, place and time     Anethesia/Sedation:  MAC anesthesia Propofol and IV levaquin 250 mg x 1 pre procedure      Procedure Details   After infom consent was obtained for the procedure, with all risks and benefits of procedure explained the patient was taken to the endoscopy suite and placed in the left lateral decubitus position. Following sequential administration of sedation as per above, the linear echoendoscope was inserted into the mouth and advanced under direct vision to second portion of the duodenum. A careful inspection was made as the gastroscope was withdrawn, including a retroflexed view of the proximal stomach; findings and interventions are described below. Findings:     Endoscopic:-E: normal, G:gastritis, D:duodenal erosion    Ultrasound:   Esophagus: not examined   Stomach: not examined   Pancreas:     Areas examined: the head, the genu, the body, the tail    Parenchyma: -parenchyma is normal    Pancreatic Duct: A cystic area/dilation is noted in the head(1.87x 1.7 cm). The PD in proximity is dilated raising the possibility if an IPMN. A single Doppler guided FNA with a 22 gauge BS needle was done with removal of cyst fluid. The cyst collapsed.    Liver:     Parenchyma: normal    Gallbladder: normal    Bile Duct: the common bile duct Lymph Node: no adenopathy        Specimen Removed:  as above    Complications: None. EBL:  None. Interventions: Fine needle aspirate 2 cc clear was performed of the pancreas cyst using a 22 gauge needle with 1 pass. Recommendations:     PO ciprofloxacin x 3 days. Surgical consultation suggested after fluid analysis (CEA,cytology, mucin stain) is back. F/u with Dr Olinda Alexandre. Darylene Fell A. Susette Laud, MD

## 2018-01-19 NOTE — PERIOP NOTES
Baby Rear.  1935  [id number]    Situation:  Verbal report received from: Jason Alonso RN  Procedure: Procedure(s):  ENDOSCOPIC ULTRASOUND (EUS)  FINE NEEDLE ASPIRATION    Background:    Preoperative diagnosis: COMPUTED TOMOGRAPHY RESULT ABNORMAL  CYST OF PANCREWS  ELEVATED CARCINOEMBRYONIC ANTIGEN IN CYST ASPIRATION W/ NEGATIVE MUCIN STAIN  FAMILY HISTORY OF MALIGNANT NEOPLASM OF GASTROINTESTINAL TRACT   Postoperative diagnosis: gastritis, pancreatic cyst    :  Dr. Fern Schirmer  Assistant(s): Endoscopy Technician-1: Babs Barrios  Endoscopy RN-1: Candelaria Wang RN    Specimens: * No specimens in log *  H. Pylori  no    Assessment:  Intra-procedure medications   Anesthesia gave intra-procedure sedation and medications, see anesthesia flow sheet yes    Intravenous fluids: NS@ KVO     Vital signs stable     Abdominal assessment: round and soft     Recommendation:  Discharge patient per MD order.   Family or Friend   Permission to share finding with family or friend yes
19-Jan-2018 16:26

## 2018-01-31 ENCOUNTER — HOSPITAL ENCOUNTER (OUTPATIENT)
Dept: CT IMAGING | Age: 83
Discharge: HOME | End: 2018-01-31
Attending: UROLOGY
Payer: MEDICARE

## 2018-01-31 DIAGNOSIS — R31.0 GROSS HEMATURIA: ICD-10-CM

## 2018-01-31 PROCEDURE — 74176 CT ABD & PELVIS W/O CONTRAST: CPT

## 2019-02-21 RX ORDER — AZITHROMYCIN 250 MG/1
250 TABLET, FILM COATED ORAL DAILY
COMMUNITY
End: 2021-01-01

## 2019-02-22 ENCOUNTER — ANESTHESIA EVENT (OUTPATIENT)
Dept: ENDOSCOPY | Age: 84
End: 2019-02-22
Payer: MEDICARE

## 2019-02-22 ENCOUNTER — HOSPITAL ENCOUNTER (OUTPATIENT)
Age: 84
Setting detail: OUTPATIENT SURGERY
Discharge: HOME | End: 2019-02-22
Attending: SPECIALIST | Admitting: SPECIALIST
Payer: MEDICARE

## 2019-02-22 ENCOUNTER — ANESTHESIA (OUTPATIENT)
Dept: ENDOSCOPY | Age: 84
End: 2019-02-22
Payer: MEDICARE

## 2019-02-22 VITALS
SYSTOLIC BLOOD PRESSURE: 131 MMHG | WEIGHT: 226.5 LBS | HEIGHT: 67 IN | RESPIRATION RATE: 18 BRPM | HEART RATE: 82 BPM | TEMPERATURE: 98.1 F | DIASTOLIC BLOOD PRESSURE: 76 MMHG | OXYGEN SATURATION: 99 % | BODY MASS INDEX: 35.55 KG/M2

## 2019-02-22 LAB
GLUCOSE BLD STRIP.AUTO-MCNC: 91 MG/DL (ref 65–100)
SERVICE CMNT-IMP: NORMAL

## 2019-02-22 PROCEDURE — 74011250636 HC RX REV CODE- 250/636

## 2019-02-22 PROCEDURE — 76060000031 HC ANESTHESIA FIRST 0.5 HR: Performed by: SPECIALIST

## 2019-02-22 PROCEDURE — 77030013992 HC SNR POLYP ENDOSC BSC -B: Performed by: SPECIALIST

## 2019-02-22 PROCEDURE — 74011250636 HC RX REV CODE- 250/636: Performed by: SPECIALIST

## 2019-02-22 PROCEDURE — 82962 GLUCOSE BLOOD TEST: CPT

## 2019-02-22 PROCEDURE — 76040000019: Performed by: SPECIALIST

## 2019-02-22 PROCEDURE — 88305 TISSUE EXAM BY PATHOLOGIST: CPT

## 2019-02-22 PROCEDURE — 74011250637 HC RX REV CODE- 250/637: Performed by: SPECIALIST

## 2019-02-22 RX ORDER — ATROPINE SULFATE 0.1 MG/ML
0.5 INJECTION INTRAVENOUS
Status: DISCONTINUED | OUTPATIENT
Start: 2019-02-22 | End: 2019-02-22 | Stop reason: HOSPADM

## 2019-02-22 RX ORDER — SODIUM CHLORIDE 0.9 % (FLUSH) 0.9 %
5-40 SYRINGE (ML) INJECTION AS NEEDED
Status: DISCONTINUED | OUTPATIENT
Start: 2019-02-22 | End: 2019-02-22 | Stop reason: HOSPADM

## 2019-02-22 RX ORDER — LIDOCAINE HYDROCHLORIDE 20 MG/ML
INJECTION, SOLUTION EPIDURAL; INFILTRATION; INTRACAUDAL; PERINEURAL AS NEEDED
Status: DISCONTINUED | OUTPATIENT
Start: 2019-02-22 | End: 2019-02-22 | Stop reason: HOSPADM

## 2019-02-22 RX ORDER — SODIUM CHLORIDE 9 MG/ML
50 INJECTION, SOLUTION INTRAVENOUS CONTINUOUS
Status: DISCONTINUED | OUTPATIENT
Start: 2019-02-22 | End: 2019-02-22 | Stop reason: HOSPADM

## 2019-02-22 RX ORDER — PROPOFOL 10 MG/ML
INJECTION, EMULSION INTRAVENOUS AS NEEDED
Status: DISCONTINUED | OUTPATIENT
Start: 2019-02-22 | End: 2019-02-22 | Stop reason: HOSPADM

## 2019-02-22 RX ORDER — FLUMAZENIL 0.1 MG/ML
0.2 INJECTION INTRAVENOUS
Status: DISCONTINUED | OUTPATIENT
Start: 2019-02-22 | End: 2019-02-22 | Stop reason: HOSPADM

## 2019-02-22 RX ORDER — SODIUM CHLORIDE 0.9 % (FLUSH) 0.9 %
5-40 SYRINGE (ML) INJECTION EVERY 8 HOURS
Status: DISCONTINUED | OUTPATIENT
Start: 2019-02-22 | End: 2019-02-22 | Stop reason: HOSPADM

## 2019-02-22 RX ORDER — DEXTROMETHORPHAN/PSEUDOEPHED 2.5-7.5/.8
1.2 DROPS ORAL
Status: DISCONTINUED | OUTPATIENT
Start: 2019-02-22 | End: 2019-02-22 | Stop reason: HOSPADM

## 2019-02-22 RX ADMIN — PROPOFOL 50 MG: 10 INJECTION, EMULSION INTRAVENOUS at 11:01

## 2019-02-22 RX ADMIN — SIMETHICONE 80 MG: 20 SUSPENSION/ DROPS ORAL at 11:00

## 2019-02-22 RX ADMIN — PROPOFOL 50 MG: 10 INJECTION, EMULSION INTRAVENOUS at 10:47

## 2019-02-22 RX ADMIN — PROPOFOL 50 MG: 10 INJECTION, EMULSION INTRAVENOUS at 10:49

## 2019-02-22 RX ADMIN — PROPOFOL 50 MG: 10 INJECTION, EMULSION INTRAVENOUS at 10:55

## 2019-02-22 RX ADMIN — LIDOCAINE HYDROCHLORIDE 20 MG: 20 INJECTION, SOLUTION EPIDURAL; INFILTRATION; INTRACAUDAL; PERINEURAL at 10:47

## 2019-02-22 RX ADMIN — SODIUM CHLORIDE 50 ML/HR: 900 INJECTION, SOLUTION INTRAVENOUS at 10:43

## 2019-02-22 NOTE — ANESTHESIA PREPROCEDURE EVALUATION
Anesthetic History No history of anesthetic complications Review of Systems / Medical History Patient summary reviewed, nursing notes reviewed and pertinent labs reviewed Pulmonary COPD Sleep apnea: CPAP Comments: RULobectomy for Ca in 2014 Neuro/Psych Psychiatric history (Depression) Cardiovascular Hypertension Dysrhythmias (SSS) : atrial fibrillation and atrial flutter Pacemaker (AICD 2010 for fib/flutter), CAD and hyperlipidemia Exercise tolerance: <4 METS Comments: EF 35% per Cardiology note in 2016 GI/Hepatic/Renal 
  
 
 
Renal disease (CKD) Comments: Hx of cyst and pseudocyst of pancreas Fam hx of malignant neoplasm of GI Tract Endo/Other Diabetes: type 2 Hypothyroidism Obesity (RUL Lung cancer; skin cancer; bladder cancer) and arthritis Pertinent negatives: Cancer: Rt lung Ca with RULobectomy 2014. Other Findings Comments: Gout Neuropathy in feet Physical Exam 
 
Airway Mallampati: II 
TM Distance: 4 - 6 cm Neck ROM: normal range of motion Mouth opening: Normal 
 
 Cardiovascular Regular rate and rhythm,  S1 and S2 normal,  no murmur, click, rub, or gallop Dental 
 
Dentition: Lower partial plate Pulmonary Breath sounds clear to auscultation Abdominal 
GI exam deferred Other Findings Anesthetic Plan ASA: 4 Anesthesia type: total IV anesthesia Induction: Intravenous Anesthetic plan and risks discussed with: Patient Took beta blker last night on schedule Glucose 91

## 2019-02-22 NOTE — PROGRESS NOTES
Anesthesia reports 200mg Propofol, 20mg Lidocaine and 900mL NS, Phenylephrine 240mcg given during procedure. Received report from anesthesia staff on vital signs and status of patient.

## 2019-02-22 NOTE — PROGRESS NOTES
Endoscope was pre-cleaned at bedside immediately following procedure by Silvia Clinton. Never smoker

## 2019-02-22 NOTE — H&P
Gastroenterology Outpatient History and Physical    Patient: Macel Gitelman. Physician: Gavin Thomas MD    Vital Signs: Blood pressure (!) 143/97, pulse 81, temperature 98 °F (36.7 °C), resp. rate 19, height 5' 7\" (1.702 m), weight 102.7 kg (226 lb 8 oz), SpO2 97 %. Allergies: No Known Allergies    Chief Complaint: FH Colon ca; personal h/o polyps    History of Present Illness: 83 yo WM for colonoscopy for + FH. Justification for Procedure: above    History:  Past Medical History:   Diagnosis Date    Arrhythmia     AFib    Arthritis     Atrial fibrillation ([address]     Cancer ([address]     right lung, skin cancer, bladder (instilled medication in bladder for tx)    Chronic kidney disease     Diabetes ([address]     NIDDM    Gout     Hypertension     Hypothyroidism     Ill-defined condition     neuropathy in feet    Other ill-defined conditions(799.89)     high cholesterol    Pacemaker     Psychiatric disorder     depression    Sleep apnea     uses CPAP      Past Surgical History:   Procedure Laterality Date    CARDIAC SURG PROCEDURE UNLIST  2010    ablation,  Pacer/defibralator    COLONOSCOPY N/A 8/8/2017    [facility]    ECHO 2D ADULT  9/2009    LVH, LAE, mild MR, EF 55-60%    HX CATARACT REMOVAL Bilateral     with lens implants    HX HEART CATHETERIZATION  2009    HX HEENT      tonsillectomy    HX KNEE ARTHROSCOPY Right     menisectomy    HX LUMBAR LAMINECTOMY      back surgery / hardware    HX OTHER SURGICAL  4/28/14    BRONCHOSCOPY/RIGHT VIDEO ASSISTED THORACOSCOPY,  2.RIGHT UPPER LOBE WEDGE RESECTION 3. COMPLETION RIGHT UPPER LOBECTOMY 4. MEDIASTINAL LYMPH NODE DISSECTION 5. INTERCOSTAL BLOCK    HX OTHER SURGICAL  4/28/14    Right Thoracoscopy, Ligation of inferior pulmonary ligament bleeding with prolene/clips    HX PACEMAKER Left 2011    AICD--Dr. Reina Aiken    HX UROLOGICAL      bladder tumor    STRESS TEST CARDIOLITE 2010    exercise/lexiscan study - poor exercise capacity, normal perfusion, EF 62%    THORACOSCOPY SURG WEDG RESEC LUNG  2015    RUL      Social History     Socioeconomic History    Marital status:      Spouse name: Not on file    Number of children: Not on file    Years of education: Not on file    Highest education level: Not on file   Tobacco Use    Smoking status: Former Smoker     Packs/day: 2.50     Years: 25.00     Pack years: 62.50     Types: Cigarettes     Last attempt to quit: 1975     Years since quittin.7    Smokeless tobacco: Never Used    Tobacco comment: quit    Substance and Sexual Activity    Alcohol use: No     Alcohol/week: 0.0 oz    Drug use: No      Family History   Problem Relation Age of Onset    Cancer Mother         pancreatic    Cancer Father         colon    Cancer Sister         uterine    Cancer Brother         lung, bladder       Medications:   Prior to Admission medications    Medication Sig Start Date End Date Taking? Authorizing Provider   azithromycin (ZITHROMAX) 250 mg tablet Take 250 mg by mouth daily. started 19--2 tablets today and then one daily for four more days   Yes Provider, Historical   apixaban (ELIQUIS) 2.5 mg tablet Take 2.5 mg by mouth two (2) times a day. Yes Provider, Historical   acetaminophen (TYLENOL EXTRA STRENGTH) 500 mg tablet Take 500 mg by mouth every six (6) hours as needed for Pain. Yes Provider, Historical   Cholecalciferol, Vitamin D3, (VITAMIN D3) 2,000 unit cap capsule Take 2,000 Units by mouth daily. Yes Provider, Historical   tamsulosin (FLOMAX) 0.4 mg capsule Take 0.4 mg by mouth daily. 11/10/16  Yes Provider, Historical   losartan (COZAAR) 25 mg tablet Take 0.5 Tabs by mouth daily. Patient taking differently: Take 25 mg by mouth daily. 16  Yes Jacki Rivas MD   metoprolol succinate (TOPROL-XL) 25 mg XL tablet Take 25 mg by mouth daily.  11/18/15  Yes Provider, Historical   ACCU-CHEK LAMONTE PLUS TEST STRP strip  4/9/15  Yes Provider, Historical   bumetanide (BUMEX) 0.5 mg tablet Take 0.5 mg by mouth daily. 5/11/15  Yes Provider, Historical   SYNTHROID 150 mcg tablet 150 mcg daily. 4/29/15  Yes Provider, Historical   escitalopram (LEXAPRO) 20 mg tablet Take 20 mg by mouth daily. Yes Provider, Historical   ezetimibe (ZETIA) 10 mg tablet take 1 tablet by mouth once daily 7/8/11  Yes Harpreet Gaviria MD   alprazolam Donia Soulier) 0.25 mg tablet Take 0.25 mg by mouth nightly as needed. Yes Provider, Historical   sitagliptin (JANUVIA) 50 mg tablet Take 50 mg by mouth daily. Yes Provider, Historical   atorvastatin (LIPITOR) 20 mg tablet Take 20 mg by mouth daily. Yes Provider, Historical   colchicine 0.6 mg tablet Take 0.6 mg by mouth daily as needed. Provider, Historical       Physical Exam:   General: alert, no distress   HEENT: Head: Normocephalic, no lesions, without obvious abnormality.    Heart: regular rate and rhythm, S1, S2 normal, no murmur, click, rub or gallop   Lungs: chest clear, no wheezing, rales, normal symmetric air entry   Abdominal: soft, NT/ND +BS   Neurological: Grossly normal   Extremities: extremities normal, atraumatic, no cyanosis or edema     Findings/Diagnosis: FH colon ca; personal h/o colon polyps    Plan of Care/Planned Procedure: Colonoscopy    Signed By: Jaylyn Laurent MD     February 22, 2019

## 2019-02-22 NOTE — ANESTHESIA POSTPROCEDURE EVALUATION
Procedure(s): 
COLONOSCOPY 
ENDOSCOPIC POLYPECTOMY. Anesthesia Post Evaluation Patient location during evaluation: PACU Note status: Adequate. Level of consciousness: responsive to verbal stimuli and sleepy but conscious Pain management: satisfactory to patient Airway patency: patent Anesthetic complications: no 
Cardiovascular status: acceptable Respiratory status: acceptable Hydration status: acceptable Comments: +Post-Anesthesia Evaluation and Assessment Patient: Sadie Vargas MRN: [id number]  SSN: [id number] YOB: 1935  Age: 82 y.o. Sex: male Cardiovascular Function/Vital Signs /76   Pulse 82   Temp 36.7 °C (98.1 °F)   Resp 18   Ht 5' 7\" (1.702 m)   Wt 102.7 kg (226 lb 8 oz)   SpO2 99%   BMI 35.47 kg/m² Patient is status post Procedure(s): 
COLONOSCOPY 
ENDOSCOPIC POLYPECTOMY. Nausea/Vomiting: Controlled. Postoperative hydration reviewed and adequate. Pain: 
Pain Scale 1: Numeric (0 - 10) (02/22/19 1147) Pain Intensity 1: 0 (02/22/19 1147) Managed. Neurological Status: At baseline. Mental Status and Level of Consciousness: Arousable. Pulmonary Status:  
O2 Device: Room air (02/22/19 1147) Adequate oxygenation and airway patent. Complications related to anesthesia: None Post-anesthesia assessment completed. No concerns. Signed By: Graham Paulino DO  
 2/22/2019 Post anesthesia nausea and vomiting:  controlled Visit Vitals /76 Pulse 82 Temp 36.7 °C (98.1 °F) Resp 18 Ht 5' 7\" (1.702 m) Wt 102.7 kg (226 lb 8 oz) SpO2 99% BMI 35.47 kg/m²

## 2019-02-22 NOTE — DISCHARGE INSTRUCTIONS
Vesta Yeager  [id number]  1935    COLON DISCHARGE INSTRUCTIONS  Discomfort:  Redness at IV site- apply warm compress to area; if redness or soreness persist- contact your physician  There may be a slight amount of blood passed from the rectum  Gaseous discomfort- walking, belching will help relieve any discomfort  You may not operate a vehicle for 12 hours  You may not engage in an occupation involving machinery or appliances for rest of today  You may not drink alcoholic beverages for at least 12 hours  Avoid making any critical decisions for at least 24 hour  DIET:   Regular diet. - however -  remember your colon is empty and a heavy meal will produce gas. Avoid these foods:  vegetables, fried / greasy foods, carbonated drinks for today. MEDICATIONS:        Regarding Aspirin or Nonsteroidal medications, please see below. ACTIVITY:  You may resume your normal daily activities it is recommended that you spend the remainder of the day resting -  avoid any strenuous activity. CALL M.D. ANY SIGN OF:  Increasing pain, nausea, vomiting  Abdominal distension (swelling)  New increased bleeding (oral or rectal)  Fever (chills)  Pain in chest area  Bloody discharge from nose or mouth  Shortness of breath    Tylenol as needed for pain. Follow-up Instructions:   Call Dr. Fabiana Ayala for results of procedure / biopsy in 4-5 days at telephone #  [phone]              Repeat Colonoscopy in 3 years.   Resume eliquis tomorrow

## 2019-02-22 NOTE — PROCEDURES
Colonoscopy Procedure Note    Indications:   Family history of coloretal cancer (screening only), Personal history of colon polyps (screening only)    Referring Physician: Renetta Su MD  Anesthesia/Sedation: MAC anesthesia Propofol  Endoscopist:  Dr. Nazia Shaw    Procedure in Detail:  Informed consent was obtained for the procedure, including sedation. Risks of perforation, hemorrhage, adverse drug reaction, and aspiration were discussed. The patient was placed in the left lateral decubitus position. Based on the pre-procedure assessment, including review of the patient's medical history, medications, allergies, and review of systems, he had been deemed to be an appropriate candidate for moderate sedation; he was therefore sedated with the medications listed above. The patient was monitored continuously with ECG tracing, pulse oximetry, blood pressure monitoring, and direct observations. A rectal examination was performed. The [id number] was inserted into the rectum and advanced under direct vision to the cecum, which was identified by the ileocecal valve and appendiceal orifice. The quality of the colonic preparation was adequate. A careful inspection was made as the colonoscope was withdrawn, including a retroflexed view of the rectum; findings and interventions are described below. Appropriate photodocumentation was obtained. Findings:     1. Scope advanced to the cecum. 2.  There were (3) small polyps s/p cold snare removal:   3 mm in ascending              3 mm in descending              3 mm in sigmoid  3. Scattered L sided diverticulosis. Therapies:  See above    Specimen: Specimens were collected as described above and sent to pathology. Complications: None were encountered during the procedure. EBL: < 10 ml. Recommendations:     - Repeat colonoscopy in 3 years.     Signed By: Timothy Phan MD                        February 22, 2019

## 2019-02-22 NOTE — PERIOP NOTES
Tommy Cardoza.  1935  [id number]    Situation:  Verbal report received from: Janay Arthur RN  Procedure: Procedure(s):  COLONOSCOPY  ENDOSCOPIC POLYPECTOMY    Background:    Preoperative diagnosis: Family history of malignant neoplasm of gastrointestinal tract [Z80.0]  Cyst and pseudocyst of pancreas [K86.2, K86.3]  Postoperative diagnosis: Colon Polyp    :  Dr. Loli Downs  Assistant(s): Endoscopy Technician-1: Jolly Lloyd  Endoscopy RN-1: Allen Ca RN  Endoscopy RN-2: Larry Smith RN    Specimens:   ID Type Source Tests Collected by Time Destination   1 : Ascending Colon Polyp Preservative Colon, Ascending  Killian Varela MD 2/22/2019 1058 Pathology   2 : Descending Colon Polyp Saline Colon, Descending  Killian Varela MD 2/22/2019 1105 Pathology   3 : Sigmoid Colon Polyp Preservative Sigmoid  Killian Varela MD 2/22/2019 1107 Pathology     H. Pylori  no    Assessment:  Intra-procedure medications   Anesthesia gave intra-procedure sedation and medications, see anesthesia flow sheet yes    Intravenous fluids: NS@ KVO     Vital signs stable     Abdominal assessment: round and soft     Recommendation:  Discharge patient per MD order.   Family or Friend   Permission to share finding with family or friend yes

## 2019-06-07 ENCOUNTER — HOSPITAL ENCOUNTER (OUTPATIENT)
Dept: PET IMAGING | Age: 84
Discharge: HOME | End: 2019-06-07
Attending: INTERNAL MEDICINE
Payer: MEDICARE

## 2019-06-07 VITALS — BODY MASS INDEX: 35.63 KG/M2 | HEIGHT: 67 IN | WEIGHT: 227 LBS

## 2019-06-07 DIAGNOSIS — C67.1 MALIGNANT NEOPLASM OF DOME OF URINARY BLADDER (HCC): ICD-10-CM

## 2019-06-07 DIAGNOSIS — C34.11 MALIGNANT NEOPLASM OF UPPER LOBE OF RIGHT LUNG (HCC): ICD-10-CM

## 2019-06-07 PROCEDURE — A9552 F18 FDG: HCPCS

## 2019-06-07 RX ORDER — SODIUM CHLORIDE 0.9 % (FLUSH) 0.9 %
10 SYRINGE (ML) INJECTION
Status: COMPLETED | OUTPATIENT
Start: 2019-06-07 | End: 2019-06-07

## 2019-06-07 RX ADMIN — Medication 10 ML: at 08:05

## 2020-05-05 ENCOUNTER — HOSPITAL ENCOUNTER (OUTPATIENT)
Dept: PET IMAGING | Age: 85
Discharge: HOME | End: 2020-05-05
Attending: INTERNAL MEDICINE
Payer: MEDICARE

## 2020-05-05 VITALS — WEIGHT: 216 LBS | HEIGHT: 67 IN | BODY MASS INDEX: 33.9 KG/M2

## 2020-05-05 DIAGNOSIS — C67.1 MALIGNANT NEOPLASM OF DOME OF BLADDER (HCC): ICD-10-CM

## 2020-05-05 DIAGNOSIS — C34.11 MALIGNANT NEOPLASM OF UPPER LOBE, RIGHT BRONCHUS OR LUNG (HCC): ICD-10-CM

## 2020-05-05 PROCEDURE — A9552 F18 FDG: HCPCS

## 2020-05-05 RX ORDER — SODIUM CHLORIDE 0.9 % (FLUSH) 0.9 %
10 SYRINGE (ML) INJECTION
Status: COMPLETED | OUTPATIENT
Start: 2020-05-05 | End: 2020-05-05

## 2020-05-05 RX ADMIN — Medication 10 ML: at 07:59

## 2021-01-01 ENCOUNTER — APPOINTMENT (OUTPATIENT)
Dept: GENERAL RADIOLOGY | Age: 86
End: 2021-01-01
Attending: EMERGENCY MEDICINE
Payer: MEDICARE

## 2021-01-01 ENCOUNTER — TRANSCRIBE ORDER (OUTPATIENT)
Dept: GENERAL RADIOLOGY | Age: 86
End: 2021-01-01

## 2021-01-01 ENCOUNTER — APPOINTMENT (OUTPATIENT)
Dept: NON INVASIVE DIAGNOSTICS | Age: 86
DRG: 291 | End: 2021-01-01
Attending: INTERNAL MEDICINE
Payer: MEDICARE

## 2021-01-01 ENCOUNTER — TRANSCRIBE ORDER (OUTPATIENT)
Dept: SCHEDULING | Age: 86
End: 2021-01-01

## 2021-01-01 ENCOUNTER — HOSPITAL ENCOUNTER (OUTPATIENT)
Dept: PET IMAGING | Age: 86
Discharge: HOME | End: 2021-05-06
Attending: INTERNAL MEDICINE
Payer: MEDICARE

## 2021-01-01 ENCOUNTER — APPOINTMENT (OUTPATIENT)
Dept: GENERAL RADIOLOGY | Age: 86
DRG: 291 | End: 2021-01-01
Attending: FAMILY MEDICINE
Payer: MEDICARE

## 2021-01-01 ENCOUNTER — APPOINTMENT (OUTPATIENT)
Dept: GENERAL RADIOLOGY | Age: 86
DRG: 291 | End: 2021-01-01
Attending: EMERGENCY MEDICINE
Payer: MEDICARE

## 2021-01-01 ENCOUNTER — APPOINTMENT (OUTPATIENT)
Dept: CT IMAGING | Age: 86
End: 2021-01-01
Attending: EMERGENCY MEDICINE
Payer: MEDICARE

## 2021-01-01 ENCOUNTER — APPOINTMENT (OUTPATIENT)
Dept: ULTRASOUND IMAGING | Age: 86
DRG: 291 | End: 2021-01-01
Attending: INTERNAL MEDICINE
Payer: MEDICARE

## 2021-01-01 ENCOUNTER — APPOINTMENT (OUTPATIENT)
Dept: GENERAL RADIOLOGY | Age: 86
DRG: 291 | End: 2021-01-01
Attending: INTERNAL MEDICINE
Payer: MEDICARE

## 2021-01-01 ENCOUNTER — HOSPITAL ENCOUNTER (EMERGENCY)
Age: 86
Discharge: HOME | End: 2021-10-31
Attending: EMERGENCY MEDICINE
Payer: MEDICARE

## 2021-01-01 ENCOUNTER — APPOINTMENT (OUTPATIENT)
Dept: ULTRASOUND IMAGING | Age: 86
DRG: 291 | End: 2021-01-01
Attending: EMERGENCY MEDICINE
Payer: MEDICARE

## 2021-01-01 ENCOUNTER — PATIENT OUTREACH (OUTPATIENT)
Dept: CASE MANAGEMENT | Age: 86
End: 2021-01-01

## 2021-01-01 ENCOUNTER — HOSPITAL ENCOUNTER (INPATIENT)
Age: 86
LOS: 5 days | Discharge: HOME HEALTH | DRG: 291 | End: 2021-12-17
Attending: EMERGENCY MEDICINE | Admitting: INTERNAL MEDICINE
Payer: MEDICARE

## 2021-01-01 ENCOUNTER — APPOINTMENT (OUTPATIENT)
Dept: GENERAL RADIOLOGY | Age: 86
DRG: 291 | End: 2021-01-01
Attending: HOSPITALIST
Payer: MEDICARE

## 2021-01-01 ENCOUNTER — HOSPITAL ENCOUNTER (EMERGENCY)
Age: 86
Discharge: HOME | End: 2021-10-26
Attending: EMERGENCY MEDICINE
Payer: MEDICARE

## 2021-01-01 ENCOUNTER — HOSPITAL ENCOUNTER (OUTPATIENT)
Dept: GENERAL RADIOLOGY | Age: 86
Discharge: HOME | End: 2021-11-16
Payer: MEDICARE

## 2021-01-01 ENCOUNTER — APPOINTMENT (OUTPATIENT)
Dept: NUCLEAR MEDICINE | Age: 86
DRG: 291 | End: 2021-01-01
Attending: INTERNAL MEDICINE
Payer: MEDICARE

## 2021-01-01 VITALS
DIASTOLIC BLOOD PRESSURE: 60 MMHG | TEMPERATURE: 98.6 F | OXYGEN SATURATION: 94 % | HEIGHT: 67 IN | WEIGHT: 210.1 LBS | RESPIRATION RATE: 20 BRPM | HEART RATE: 84 BPM | BODY MASS INDEX: 32.98 KG/M2 | SYSTOLIC BLOOD PRESSURE: 106 MMHG

## 2021-01-01 VITALS
SYSTOLIC BLOOD PRESSURE: 138 MMHG | RESPIRATION RATE: 18 BRPM | DIASTOLIC BLOOD PRESSURE: 88 MMHG | HEART RATE: 86 BPM | WEIGHT: 230.6 LBS | OXYGEN SATURATION: 97 % | TEMPERATURE: 98.5 F | BODY MASS INDEX: 36.12 KG/M2

## 2021-01-01 VITALS
DIASTOLIC BLOOD PRESSURE: 74 MMHG | WEIGHT: 219.58 LBS | OXYGEN SATURATION: 95 % | BODY MASS INDEX: 34.46 KG/M2 | HEART RATE: 88 BPM | RESPIRATION RATE: 16 BRPM | HEIGHT: 67 IN | SYSTOLIC BLOOD PRESSURE: 143 MMHG | TEMPERATURE: 99 F

## 2021-01-01 DIAGNOSIS — R15.9 INCONTINENCE OF BOWEL: ICD-10-CM

## 2021-01-01 DIAGNOSIS — R07.89 MUSCULOSKELETAL CHEST PAIN: Primary | ICD-10-CM

## 2021-01-01 DIAGNOSIS — R15.9 INCONTINENCE OF BOWEL: Primary | ICD-10-CM

## 2021-01-01 DIAGNOSIS — C34.11 MALIGNANT NEOPLASM OF UPPER LOBE OF RIGHT LUNG (HCC): ICD-10-CM

## 2021-01-01 DIAGNOSIS — R04.0 EPISTAXIS: ICD-10-CM

## 2021-01-01 DIAGNOSIS — S00.83XA FACIAL HEMATOMA, INITIAL ENCOUNTER: ICD-10-CM

## 2021-01-01 DIAGNOSIS — C67.1 MALIGNANT NEOPLASM OF DOME OF URINARY BLADDER (HCC): ICD-10-CM

## 2021-01-01 DIAGNOSIS — R06.02 SOB (SHORTNESS OF BREATH): ICD-10-CM

## 2021-01-01 DIAGNOSIS — C34.11 MALIGNANT NEOPLASM OF UPPER LOBE OF RIGHT LUNG (HCC): Primary | ICD-10-CM

## 2021-01-01 DIAGNOSIS — R55 NEAR SYNCOPE: Primary | ICD-10-CM

## 2021-01-01 DIAGNOSIS — T14.8XXA MULTIPLE SKIN TEARS: ICD-10-CM
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
## 2021-01-01 DIAGNOSIS — I50.31 ACUTE DIASTOLIC CHF (CONGESTIVE HEART FAILURE) (HCC): ICD-10-CM

## 2021-01-01 DIAGNOSIS — N18.4 CKD (CHRONIC KIDNEY DISEASE) STAGE 4, GFR 15-29 ML/MIN (HCC): ICD-10-CM

## 2021-01-01 LAB
ALBUMIN SERPL-MCNC: 2.7 G/DL (ref 3.5–5)
ALBUMIN SERPL-MCNC: 2.8 G/DL (ref 3.5–5)
ALBUMIN SERPL-MCNC: 2.9 G/DL (ref 3.5–5)
ALBUMIN SERPL-MCNC: 3.2 G/DL (ref 3.5–5)
ALBUMIN SERPL-MCNC: 3.3 G/DL (ref 3.5–5)
ALBUMIN/GLOB SERPL: 0.9 {RATIO} (ref 1.1–2.2)
ALP SERPL-CCNC: 80 U/L (ref 45–117)
ALP SERPL-CCNC: 81 U/L (ref 45–117)
ALP SERPL-CCNC: 96 U/L (ref 45–117)
ALT SERPL-CCNC: 37 U/L (ref 12–78)
ALT SERPL-CCNC: 38 U/L (ref 12–78)
ALT SERPL-CCNC: 52 U/L (ref 12–78)
ANION GAP SERPL CALC-SCNC: 11 MMOL/L (ref 5–15)
ANION GAP SERPL CALC-SCNC: 2 MMOL/L (ref 5–15)
ANION GAP SERPL CALC-SCNC: 6 MMOL/L (ref 5–15)
ANION GAP SERPL CALC-SCNC: 7 MMOL/L (ref 5–15)
ANION GAP SERPL CALC-SCNC: 8 MMOL/L (ref 5–15)
APPEARANCE UR: CLEAR
AST SERPL-CCNC: 25 U/L (ref 15–37)
AST SERPL-CCNC: 34 U/L (ref 15–37)
AST SERPL-CCNC: 53 U/L (ref 15–37)
ATRIAL RATE: 75 BPM
ATRIAL RATE: 90 BPM
BACTERIA URNS QL MICRO: NEGATIVE /HPF
BASOPHILS # BLD: 0 K/UL (ref 0–0.1)
BASOPHILS NFR BLD: 0 % (ref 0–1)
BASOPHILS NFR BLD: 0 % (ref 0–1)
BASOPHILS NFR BLD: 1 % (ref 0–1)
BILIRUB SERPL-MCNC: 0.5 MG/DL (ref 0.2–1)
BILIRUB SERPL-MCNC: 0.6 MG/DL (ref 0.2–1)
BILIRUB SERPL-MCNC: 0.7 MG/DL (ref 0.2–1)
BILIRUB UR QL: NEGATIVE
BNP SERPL-MCNC: ABNORMAL PG/ML (ref 0–450)
BUN SERPL-MCNC: 47 MG/DL (ref 6–20)
BUN SERPL-MCNC: 48 MG/DL (ref 6–20)
BUN SERPL-MCNC: 48 MG/DL (ref 6–20)
BUN SERPL-MCNC: 52 MG/DL (ref 6–20)
BUN SERPL-MCNC: 55 MG/DL (ref 6–20)
BUN SERPL-MCNC: 57 MG/DL (ref 6–20)
BUN SERPL-MCNC: 67 MG/DL (ref 6–20)
BUN/CREAT SERPL: 13 (ref 12–20)
BUN/CREAT SERPL: 14 (ref 12–20)
BUN/CREAT SERPL: 14 (ref 12–20)
BUN/CREAT SERPL: 15 (ref 12–20)
BUN/CREAT SERPL: 18 (ref 12–20)
BUN/CREAT SERPL: 18 (ref 12–20)
BUN/CREAT SERPL: 20 (ref 12–20)
CALCIUM SERPL-MCNC: 8.3 MG/DL (ref 8.5–10.1)
CALCIUM SERPL-MCNC: 8.4 MG/DL (ref 8.5–10.1)
CALCIUM SERPL-MCNC: 8.4 MG/DL (ref 8.5–10.1)
CALCIUM SERPL-MCNC: 8.6 MG/DL (ref 8.5–10.1)
CALCIUM SERPL-MCNC: 8.7 MG/DL (ref 8.5–10.1)
CALCULATED R AXIS, ECG10: -89 DEGREES
CALCULATED R AXIS, ECG10: -90 DEGREES
CALCULATED T AXIS, ECG11: 103 DEGREES
CALCULATED T AXIS, ECG11: 75 DEGREES
CHLORIDE SERPL-SCNC: 101 MMOL/L (ref 97–108)
CHLORIDE SERPL-SCNC: 102 MMOL/L (ref 97–108)
CHLORIDE SERPL-SCNC: 105 MMOL/L (ref 97–108)
CHLORIDE SERPL-SCNC: 105 MMOL/L (ref 97–108)
CHLORIDE SERPL-SCNC: 106 MMOL/L (ref 97–108)
CHLORIDE SERPL-SCNC: 106 MMOL/L (ref 97–108)
CHLORIDE SERPL-SCNC: 109 MMOL/L (ref 97–108)
CO2 SERPL-SCNC: 20 MMOL/L (ref 21–32)
CO2 SERPL-SCNC: 22 MMOL/L (ref 21–32)
CO2 SERPL-SCNC: 24 MMOL/L (ref 21–32)
CO2 SERPL-SCNC: 25 MMOL/L (ref 21–32)
CO2 SERPL-SCNC: 26 MMOL/L (ref 21–32)
CO2 SERPL-SCNC: 26 MMOL/L (ref 21–32)
CO2 SERPL-SCNC: 30 MMOL/L (ref 21–32)
COLOR UR: ABNORMAL
COMMENT, HOLDF: NORMAL
COMMENT, HOLDF: NORMAL
CREAT SERPL-MCNC: 3.13 MG/DL (ref 0.7–1.3)
CREAT SERPL-MCNC: 3.25 MG/DL (ref 0.7–1.3)
CREAT SERPL-MCNC: 3.3 MG/DL (ref 0.7–1.3)
CREAT SERPL-MCNC: 3.36 MG/DL (ref 0.7–1.3)
CREAT SERPL-MCNC: 3.39 MG/DL (ref 0.7–1.3)
CREAT SERPL-MCNC: 3.5 MG/DL (ref 0.7–1.3)
CREAT SERPL-MCNC: 3.56 MG/DL (ref 0.7–1.3)
D DIMER PPP FEU-MCNC: 8.14 MG/L FEU (ref 0–0.65)
DIAGNOSIS, 93000: NORMAL
DIAGNOSIS, 93000: NORMAL
DIFFERENTIAL METHOD BLD: ABNORMAL
ECHO AO ROOT DIAM: 3.3 CM
ECHO AO ROOT INDEX: 1.59 CM/M2
ECHO AV AREA PEAK VELOCITY: 1 CM2
ECHO AV AREA PEAK VELOCITY: 1 CM2
ECHO AV AREA PEAK VELOCITY: 1.1 CM2
ECHO AV AREA PEAK VELOCITY: 1.6 CM2
ECHO AV AREA PEAK VELOCITY: 1.6 CM2
ECHO AV AREA PEAK VELOCITY: 1.7 CM2
ECHO AV AREA PEAK VELOCITY: 2.5 CM2
ECHO AV AREA PEAK VELOCITY: 4 CM2
ECHO AV AREA VTI: 1.6 CM2
ECHO AV AREA VTI: 1.8 CM2
ECHO AV MEAN GRADIENT: 13 MMHG
ECHO AV MEAN VELOCITY: 1.7 M/S
ECHO AV PEAK GRADIENT: 18 MMHG
ECHO AV PEAK GRADIENT: 21 MMHG
ECHO AV PEAK VELOCITY: 2.2 M/S
ECHO AV PEAK VELOCITY: 2.3 M/S
ECHO AV VTI: 46.2 CM
ECHO LA DIAMETER INDEX: 3.22 CM/M2
ECHO LA DIAMETER: 6.7 CM
ECHO LA TO AORTIC ROOT RATIO: 2.03
ECHO LV FRACTIONAL SHORTENING: 38 % (ref 28–44)
ECHO LV INTERNAL DIMENSION DIASTOLE INDEX: 2.69 CM/M2
ECHO LV INTERNAL DIMENSION DIASTOLIC: 5.6 CM (ref 4.2–5.9)
ECHO LV INTERNAL DIMENSION SYSTOLIC INDEX: 1.68 CM/M2
ECHO LV INTERNAL DIMENSION SYSTOLIC: 3.5 CM
ECHO LV IVSD: 0.9 CM (ref 0.6–1)
ECHO LV MASS 2D: 205.5 G (ref 88–224)
ECHO LV MASS INDEX 2D: 98.8 G/M2 (ref 49–115)
ECHO LV POSTERIOR WALL DIASTOLIC: 1 CM (ref 0.6–1)
ECHO LV RELATIVE WALL THICKNESS RATIO: 0.36
ECHO LVOT AREA: 4.5 CM2
ECHO LVOT AV VTI INDEX: 0.37
ECHO LVOT DIAM: 2.4 CM
ECHO LVOT MEAN GRADIENT: 2 MMHG
ECHO LVOT PEAK GRADIENT: 1 MMHG
ECHO LVOT PEAK GRADIENT: 3 MMHG
ECHO LVOT PEAK VELOCITY: 0.5 M/S
ECHO LVOT PEAK VELOCITY: 0.8 M/S
ECHO LVOT STROKE VOLUME INDEX: 36.7 ML/M2
ECHO LVOT SV: 76.4 ML
ECHO LVOT VTI: 16.9 CM
ECHO MV E VELOCITY: 1.04 M/S
ECHO PV MAX VELOCITY: 0.7 M/S
ECHO PV PEAK GRADIENT: 2 MMHG
ECHO RV TAPSE: 1.3 CM (ref 1.5–2)
ECHO TV REGURGITANT MAX VELOCITY: 3.04 M/S
ECHO TV REGURGITANT PEAK GRADIENT: 37 MMHG
EOSINOPHIL # BLD: 0.1 K/UL (ref 0–0.4)
EOSINOPHIL # BLD: 0.2 K/UL (ref 0–0.4)
EOSINOPHIL # BLD: 0.2 K/UL (ref 0–0.4)
EOSINOPHIL NFR BLD: 2 % (ref 0–7)
EOSINOPHIL NFR BLD: 3 % (ref 0–7)
EPITH CASTS URNS QL MICRO: ABNORMAL /LPF
ERYTHROCYTE [DISTWIDTH] IN BLOOD BY AUTOMATED COUNT: 14.2 % (ref 11.5–14.5)
ERYTHROCYTE [DISTWIDTH] IN BLOOD BY AUTOMATED COUNT: 14.3 % (ref 11.5–14.5)
ERYTHROCYTE [DISTWIDTH] IN BLOOD BY AUTOMATED COUNT: 14.3 % (ref 11.5–14.5)
ERYTHROCYTE [DISTWIDTH] IN BLOOD BY AUTOMATED COUNT: 14.4 % (ref 11.5–14.5)
ERYTHROCYTE [DISTWIDTH] IN BLOOD BY AUTOMATED COUNT: 14.6 % (ref 11.5–14.5)
ERYTHROCYTE [DISTWIDTH] IN BLOOD BY AUTOMATED COUNT: 14.7 % (ref 11.5–14.5)
GLOBULIN SER CALC-MCNC: 3.2 G/DL (ref 2–4)
GLOBULIN SER CALC-MCNC: 3.5 G/DL (ref 2–4)
GLOBULIN SER CALC-MCNC: 3.6 G/DL (ref 2–4)
GLUCOSE BLD STRIP.AUTO-MCNC: 105 MG/DL (ref 65–117)
GLUCOSE BLD STRIP.AUTO-MCNC: 108 MG/DL (ref 65–117)
GLUCOSE BLD STRIP.AUTO-MCNC: 111 MG/DL (ref 65–100)
GLUCOSE BLD STRIP.AUTO-MCNC: 111 MG/DL (ref 65–117)
GLUCOSE BLD STRIP.AUTO-MCNC: 116 MG/DL (ref 65–117)
GLUCOSE BLD STRIP.AUTO-MCNC: 119 MG/DL (ref 65–117)
GLUCOSE BLD STRIP.AUTO-MCNC: 119 MG/DL (ref 65–117)
GLUCOSE BLD STRIP.AUTO-MCNC: 121 MG/DL (ref 65–117)
GLUCOSE BLD STRIP.AUTO-MCNC: 126 MG/DL (ref 65–117)
GLUCOSE BLD STRIP.AUTO-MCNC: 127 MG/DL (ref 65–117)
GLUCOSE BLD STRIP.AUTO-MCNC: 128 MG/DL (ref 65–117)
GLUCOSE BLD STRIP.AUTO-MCNC: 131 MG/DL (ref 65–117)
GLUCOSE BLD STRIP.AUTO-MCNC: 133 MG/DL (ref 65–117)
GLUCOSE BLD STRIP.AUTO-MCNC: 134 MG/DL (ref 65–117)
GLUCOSE BLD STRIP.AUTO-MCNC: 138 MG/DL (ref 65–117)
GLUCOSE BLD STRIP.AUTO-MCNC: 138 MG/DL (ref 65–117)
GLUCOSE BLD STRIP.AUTO-MCNC: 144 MG/DL (ref 65–117)
GLUCOSE BLD STRIP.AUTO-MCNC: 156 MG/DL (ref 65–117)
GLUCOSE BLD STRIP.AUTO-MCNC: 168 MG/DL (ref 65–117)
GLUCOSE BLD STRIP.AUTO-MCNC: 172 MG/DL (ref 65–117)
GLUCOSE SERPL-MCNC: 101 MG/DL (ref 65–100)
GLUCOSE SERPL-MCNC: 109 MG/DL (ref 65–100)
GLUCOSE SERPL-MCNC: 111 MG/DL (ref 65–100)
GLUCOSE SERPL-MCNC: 121 MG/DL (ref 65–100)
GLUCOSE SERPL-MCNC: 142 MG/DL (ref 65–100)
GLUCOSE SERPL-MCNC: 200 MG/DL (ref 65–100)
GLUCOSE SERPL-MCNC: 207 MG/DL (ref 65–100)
GLUCOSE UR STRIP.AUTO-MCNC: NEGATIVE MG/DL
HCT VFR BLD AUTO: 25.8 % (ref 36.6–50.3)
HCT VFR BLD AUTO: 27.4 % (ref 36.6–50.3)
HCT VFR BLD AUTO: 28 % (ref 36.6–50.3)
HCT VFR BLD AUTO: 28.4 % (ref 36.6–50.3)
HCT VFR BLD AUTO: 30.4 % (ref 36.6–50.3)
HCT VFR BLD AUTO: 31.9 % (ref 36.6–50.3)
HGB BLD-MCNC: 8.2 G/DL (ref 12.1–17)
HGB BLD-MCNC: 8.6 G/DL (ref 12.1–17)
HGB BLD-MCNC: 8.7 G/DL (ref 12.1–17)
HGB BLD-MCNC: 8.9 G/DL (ref 12.1–17)
HGB BLD-MCNC: 9.5 G/DL (ref 12.1–17)
HGB BLD-MCNC: 9.9 G/DL (ref 12.1–17)
HGB UR QL STRIP: ABNORMAL
HYALINE CASTS URNS QL MICRO: ABNORMAL /LPF (ref 0–5)
IMM GRANULOCYTES # BLD AUTO: 0 K/UL (ref 0–0.04)
IMM GRANULOCYTES # BLD AUTO: 0.1 K/UL (ref 0–0.04)
IMM GRANULOCYTES NFR BLD AUTO: 1 % (ref 0–0.5)
KETONES UR QL STRIP.AUTO: NEGATIVE MG/DL
LEUKOCYTE ESTERASE UR QL STRIP.AUTO: NEGATIVE
LYMPHOCYTES # BLD: 0.9 K/UL (ref 0.8–3.5)
LYMPHOCYTES # BLD: 0.9 K/UL (ref 0.8–3.5)
LYMPHOCYTES # BLD: 1.1 K/UL (ref 0.8–3.5)
LYMPHOCYTES # BLD: 1.2 K/UL (ref 0.8–3.5)
LYMPHOCYTES # BLD: 1.4 K/UL (ref 0.8–3.5)
LYMPHOCYTES # BLD: 1.4 K/UL (ref 0.8–3.5)
LYMPHOCYTES NFR BLD: 16 % (ref 12–49)
LYMPHOCYTES NFR BLD: 17 % (ref 12–49)
LYMPHOCYTES NFR BLD: 19 % (ref 12–49)
LYMPHOCYTES NFR BLD: 20 % (ref 12–49)
LYMPHOCYTES NFR BLD: 20 % (ref 12–49)
LYMPHOCYTES NFR BLD: 27 % (ref 12–49)
MAGNESIUM SERPL-MCNC: 2.1 MG/DL (ref 1.6–2.4)
MAGNESIUM SERPL-MCNC: 2.2 MG/DL (ref 1.6–2.4)
MCH RBC QN AUTO: 30.3 PG (ref 26–34)
MCH RBC QN AUTO: 30.4 PG (ref 26–34)
MCH RBC QN AUTO: 30.8 PG (ref 26–34)
MCH RBC QN AUTO: 30.9 PG (ref 26–34)
MCH RBC QN AUTO: 31 PG (ref 26–34)
MCH RBC QN AUTO: 31.2 PG (ref 26–34)
MCHC RBC AUTO-ENTMCNC: 30.6 G/DL (ref 30–36.5)
MCHC RBC AUTO-ENTMCNC: 31 G/DL (ref 30–36.5)
MCHC RBC AUTO-ENTMCNC: 31.3 G/DL (ref 30–36.5)
MCHC RBC AUTO-ENTMCNC: 31.4 G/DL (ref 30–36.5)
MCHC RBC AUTO-ENTMCNC: 31.8 G/DL (ref 30–36.5)
MCHC RBC AUTO-ENTMCNC: 31.8 G/DL (ref 30–36.5)
MCV RBC AUTO: 96.8 FL (ref 80–99)
MCV RBC AUTO: 97.6 FL (ref 80–99)
MCV RBC AUTO: 98.1 FL (ref 80–99)
MCV RBC AUTO: 98.6 FL (ref 80–99)
MCV RBC AUTO: 99.3 FL (ref 80–99)
MCV RBC AUTO: 99.4 FL (ref 80–99)
MONOCYTES # BLD: 0.3 K/UL (ref 0–1)
MONOCYTES # BLD: 0.4 K/UL (ref 0–1)
MONOCYTES # BLD: 0.5 K/UL (ref 0–1)
MONOCYTES NFR BLD: 6 % (ref 5–13)
MONOCYTES NFR BLD: 7 % (ref 5–13)
MONOCYTES NFR BLD: 7 % (ref 5–13)
MONOCYTES NFR BLD: 8 % (ref 5–13)
NEUTS SEG # BLD: 3.2 K/UL (ref 1.8–8)
NEUTS SEG # BLD: 3.6 K/UL (ref 1.8–8)
NEUTS SEG # BLD: 3.6 K/UL (ref 1.8–8)
NEUTS SEG # BLD: 4 K/UL (ref 1.8–8)
NEUTS SEG # BLD: 4.4 K/UL (ref 1.8–8)
NEUTS SEG # BLD: 5.3 K/UL (ref 1.8–8)
NEUTS SEG NFR BLD: 61 % (ref 32–75)
NEUTS SEG NFR BLD: 68 % (ref 32–75)
NEUTS SEG NFR BLD: 70 % (ref 32–75)
NEUTS SEG NFR BLD: 70 % (ref 32–75)
NEUTS SEG NFR BLD: 71 % (ref 32–75)
NEUTS SEG NFR BLD: 72 % (ref 32–75)
NITRITE UR QL STRIP.AUTO: NEGATIVE
NRBC # BLD: 0 K/UL (ref 0–0.01)
NRBC BLD-RTO: 0 PER 100 WBC
PH UR STRIP: 5.5 [PH] (ref 5–8)
PHOSPHATE SERPL-MCNC: 4 MG/DL (ref 2.6–4.7)
PHOSPHATE SERPL-MCNC: 4.2 MG/DL (ref 2.6–4.7)
PLATELET # BLD AUTO: 106 K/UL (ref 150–400)
PLATELET # BLD AUTO: 119 K/UL (ref 150–400)
PLATELET # BLD AUTO: 119 K/UL (ref 150–400)
PLATELET # BLD AUTO: 121 K/UL (ref 150–400)
PLATELET # BLD AUTO: 126 K/UL (ref 150–400)
PLATELET # BLD AUTO: 128 K/UL (ref 150–400)
PMV BLD AUTO: 9 FL (ref 8.9–12.9)
PMV BLD AUTO: 9.1 FL (ref 8.9–12.9)
PMV BLD AUTO: 9.4 FL (ref 8.9–12.9)
PMV BLD AUTO: 9.5 FL (ref 8.9–12.9)
PMV BLD AUTO: 9.5 FL (ref 8.9–12.9)
PMV BLD AUTO: 9.9 FL (ref 8.9–12.9)
POTASSIUM SERPL-SCNC: 3.7 MMOL/L (ref 3.5–5.1)
POTASSIUM SERPL-SCNC: 3.7 MMOL/L (ref 3.5–5.1)
POTASSIUM SERPL-SCNC: 3.9 MMOL/L (ref 3.5–5.1)
POTASSIUM SERPL-SCNC: 4.1 MMOL/L (ref 3.5–5.1)
POTASSIUM SERPL-SCNC: 4.3 MMOL/L (ref 3.5–5.1)
POTASSIUM SERPL-SCNC: 5.1 MMOL/L (ref 3.5–5.1)
POTASSIUM SERPL-SCNC: 5.5 MMOL/L (ref 3.5–5.1)
PROT SERPL-MCNC: 6.1 G/DL (ref 6.4–8.2)
PROT SERPL-MCNC: 6.8 G/DL (ref 6.4–8.2)
PROT SERPL-MCNC: 6.8 G/DL (ref 6.4–8.2)
PROT UR STRIP-MCNC: 100 MG/DL
Q-T INTERVAL, ECG07: 428 MS
Q-T INTERVAL, ECG07: 454 MS
QRS DURATION, ECG06: 156 MS
QRS DURATION, ECG06: 158 MS
QTC CALCULATION (BEZET), ECG08: 526 MS
QTC CALCULATION (BEZET), ECG08: 543 MS
RBC # BLD AUTO: 2.63 M/UL (ref 4.1–5.7)
RBC # BLD AUTO: 2.78 M/UL (ref 4.1–5.7)
RBC # BLD AUTO: 2.86 M/UL (ref 4.1–5.7)
RBC # BLD AUTO: 2.87 M/UL (ref 4.1–5.7)
RBC # BLD AUTO: 3.14 M/UL (ref 4.1–5.7)
RBC # BLD AUTO: 3.21 M/UL (ref 4.1–5.7)
RBC #/AREA URNS HPF: ABNORMAL /HPF (ref 0–5)
SAMPLES BEING HELD,HOLD: NORMAL
SAMPLES BEING HELD,HOLD: NORMAL
SERVICE CMNT-IMP: ABNORMAL
SERVICE CMNT-IMP: NORMAL
SODIUM SERPL-SCNC: 123 MMOL/L (ref 136–145)
SODIUM SERPL-SCNC: 137 MMOL/L (ref 136–145)
SODIUM SERPL-SCNC: 138 MMOL/L (ref 136–145)
SODIUM SERPL-SCNC: 138 MMOL/L (ref 136–145)
SODIUM SERPL-SCNC: 139 MMOL/L (ref 136–145)
SODIUM SERPL-SCNC: 140 MMOL/L (ref 136–145)
SODIUM SERPL-SCNC: 142 MMOL/L (ref 136–145)
SP GR UR REFRACTOMETRY: 1.01 (ref 1–1.03)
TROPONIN-HIGH SENSITIVITY: 28 NG/L (ref 0–76)
TROPONIN-HIGH SENSITIVITY: 28 NG/L (ref 0–76)
TROPONIN-HIGH SENSITIVITY: 32 NG/L (ref 0–76)
TSH SERPL DL<=0.05 MIU/L-ACNC: 4.47 UIU/ML (ref 0.36–3.74)
UR CULT HOLD, URHOLD: NORMAL
UROBILINOGEN UR QL STRIP.AUTO: 0.2 EU/DL (ref 0.2–1)
VENTRICULAR RATE, ECG03: 86 BPM
VENTRICULAR RATE, ECG03: 91 BPM
WBC # BLD AUTO: 5.1 K/UL (ref 4.1–11.1)
WBC # BLD AUTO: 5.1 K/UL (ref 4.1–11.1)
WBC # BLD AUTO: 5.3 K/UL (ref 4.1–11.1)
WBC # BLD AUTO: 5.5 K/UL (ref 4.1–11.1)
WBC # BLD AUTO: 6.2 K/UL (ref 4.1–11.1)
WBC # BLD AUTO: 7.3 K/UL (ref 4.1–11.1)
WBC URNS QL MICRO: ABNORMAL /HPF (ref 0–4)

## 2021-01-01 PROCEDURE — 65660000000 HC RM CCU STEPDOWN

## 2021-01-01 PROCEDURE — 76000 FLUOROSCOPY <1 HR PHYS/QHP: CPT

## 2021-01-01 PROCEDURE — 94640 AIRWAY INHALATION TREATMENT: CPT

## 2021-01-01 PROCEDURE — 74011000250 HC RX REV CODE- 250: Performed by: HOSPITALIST

## 2021-01-01 PROCEDURE — 51798 US URINE CAPACITY MEASURE: CPT

## 2021-01-01 PROCEDURE — 74011250637 HC RX REV CODE- 250/637: Performed by: EMERGENCY MEDICINE

## 2021-01-01 PROCEDURE — 36415 COLL VENOUS BLD VENIPUNCTURE: CPT

## 2021-01-01 PROCEDURE — 90715 TDAP VACCINE 7 YRS/> IM: CPT | Performed by: EMERGENCY MEDICINE

## 2021-01-01 PROCEDURE — 83735 ASSAY OF MAGNESIUM: CPT

## 2021-01-01 PROCEDURE — 82962 GLUCOSE BLOOD TEST: CPT

## 2021-01-01 PROCEDURE — 99285 EMERGENCY DEPT VISIT HI MDM: CPT

## 2021-01-01 PROCEDURE — 71046 X-RAY EXAM CHEST 2 VIEWS: CPT

## 2021-01-01 PROCEDURE — 74011250637 HC RX REV CODE- 250/637: Performed by: INTERNAL MEDICINE

## 2021-01-01 PROCEDURE — 74011250636 HC RX REV CODE- 250/636: Performed by: INTERNAL MEDICINE

## 2021-01-01 PROCEDURE — 99284 EMERGENCY DEPT VISIT MOD MDM: CPT

## 2021-01-01 PROCEDURE — 80069 RENAL FUNCTION PANEL: CPT

## 2021-01-01 PROCEDURE — 81001 URINALYSIS AUTO W/SCOPE: CPT

## 2021-01-01 PROCEDURE — 80053 COMPREHEN METABOLIC PANEL: CPT

## 2021-01-01 PROCEDURE — 97116 GAIT TRAINING THERAPY: CPT

## 2021-01-01 PROCEDURE — 80048 BASIC METABOLIC PNL TOTAL CA: CPT

## 2021-01-01 PROCEDURE — 70450 CT HEAD/BRAIN W/O DYE: CPT

## 2021-01-01 PROCEDURE — 83880 ASSAY OF NATRIURETIC PEPTIDE: CPT

## 2021-01-01 PROCEDURE — 93306 TTE W/DOPPLER COMPLETE: CPT

## 2021-01-01 PROCEDURE — A9552 F18 FDG: HCPCS

## 2021-01-01 PROCEDURE — 71045 X-RAY EXAM CHEST 1 VIEW: CPT

## 2021-01-01 PROCEDURE — 74011000250 HC RX REV CODE- 250: Performed by: INTERNAL MEDICINE

## 2021-01-01 PROCEDURE — 84443 ASSAY THYROID STIM HORMONE: CPT

## 2021-01-01 PROCEDURE — 85025 COMPLETE CBC W/AUTO DIFF WBC: CPT

## 2021-01-01 PROCEDURE — 93005 ELECTROCARDIOGRAM TRACING: CPT

## 2021-01-01 PROCEDURE — 74011000250 HC RX REV CODE- 250: Performed by: EMERGENCY MEDICINE

## 2021-01-01 PROCEDURE — 92610 EVALUATE SWALLOWING FUNCTION: CPT

## 2021-01-01 PROCEDURE — 85379 FIBRIN DEGRADATION QUANT: CPT

## 2021-01-01 PROCEDURE — 94664 DEMO&/EVAL PT USE INHALER: CPT

## 2021-01-01 PROCEDURE — 74018 RADEX ABDOMEN 1 VIEW: CPT | Performed by: PHYSICIAN ASSISTANT

## 2021-01-01 PROCEDURE — 74011250636 HC RX REV CODE- 250/636: Performed by: EMERGENCY MEDICINE

## 2021-01-01 PROCEDURE — 93970 EXTREMITY STUDY: CPT

## 2021-01-01 PROCEDURE — 84484 ASSAY OF TROPONIN QUANT: CPT

## 2021-01-01 PROCEDURE — 76604 US EXAM CHEST: CPT

## 2021-01-01 PROCEDURE — 90471 IMMUNIZATION ADMIN: CPT

## 2021-01-01 PROCEDURE — 77030013140 HC MSK NEB VYRM -A

## 2021-01-01 PROCEDURE — 74220 X-RAY XM ESOPHAGUS 1CNTRST: CPT

## 2021-01-01 PROCEDURE — A9540 TC99M MAA: HCPCS

## 2021-01-01 PROCEDURE — 70486 CT MAXILLOFACIAL W/O DYE: CPT

## 2021-01-01 PROCEDURE — 97161 PT EVAL LOW COMPLEX 20 MIN: CPT

## 2021-01-01 RX ORDER — FUROSEMIDE 40 MG/1
40 TABLET ORAL DAILY
Status: DISCONTINUED | OUTPATIENT
Start: 2021-01-01 | End: 2021-01-01

## 2021-01-01 RX ORDER — FUROSEMIDE 10 MG/ML
20 INJECTION INTRAMUSCULAR; INTRAVENOUS DAILY
Status: DISCONTINUED | OUTPATIENT
Start: 2021-01-01 | End: 2021-01-01

## 2021-01-01 RX ORDER — BUMETANIDE 1 MG/1
1 TABLET ORAL DAILY
Status: DISCONTINUED | OUTPATIENT
Start: 2021-01-01 | End: 2021-01-01

## 2021-01-01 RX ORDER — TAMSULOSIN HYDROCHLORIDE 0.4 MG/1
0.4 CAPSULE ORAL DAILY
Status: DISCONTINUED | OUTPATIENT
Start: 2021-01-01 | End: 2021-01-01 | Stop reason: HOSPADM

## 2021-01-01 RX ORDER — BACITRACIN 500 UNIT/G
1 PACKET (EA) TOPICAL ONCE
Status: COMPLETED | OUTPATIENT
Start: 2021-01-01 | End: 2021-01-01

## 2021-01-01 RX ORDER — ASPIRIN 325 MG
325 TABLET ORAL
Status: COMPLETED | OUTPATIENT
Start: 2021-01-01 | End: 2021-01-01

## 2021-01-01 RX ORDER — ONDANSETRON 2 MG/ML
4 INJECTION INTRAMUSCULAR; INTRAVENOUS
Status: DISCONTINUED | OUTPATIENT
Start: 2021-01-01 | End: 2021-01-01 | Stop reason: HOSPADM

## 2021-01-01 RX ORDER — BUMETANIDE 2 MG/1
2 TABLET ORAL DAILY
Qty: 30 TABLET | Refills: 0 | Status: ON HOLD | OUTPATIENT
Start: 2021-01-01 | End: 2022-01-01

## 2021-01-01 RX ORDER — ALBUTEROL SULFATE 90 UG/1
2 AEROSOL, METERED RESPIRATORY (INHALATION)
Qty: 18 G | Refills: 0 | Status: SHIPPED | OUTPATIENT
Start: 2021-01-01

## 2021-01-01 RX ORDER — MONTELUKAST SODIUM 4 MG/1
2 TABLET, CHEWABLE ORAL
Status: DISCONTINUED | OUTPATIENT
Start: 2021-01-01 | End: 2021-01-01 | Stop reason: HOSPADM

## 2021-01-01 RX ORDER — LEVOTHYROXINE SODIUM 150 UG/1
150 TABLET ORAL
Status: DISCONTINUED | OUTPATIENT
Start: 2021-01-01 | End: 2021-01-01 | Stop reason: HOSPADM

## 2021-01-01 RX ORDER — BISMUTH SUBSALICYLATE 262 MG/1
1 TABLET, CHEWABLE ORAL
Status: ON HOLD | COMMUNITY
End: 2021-01-01 | Stop reason: SINTOL

## 2021-01-01 RX ORDER — ACETAMINOPHEN 325 MG/1
650 TABLET ORAL
Status: COMPLETED | OUTPATIENT
Start: 2021-01-01 | End: 2021-01-01

## 2021-01-01 RX ORDER — FACIAL-BODY WIPES
10 EACH TOPICAL DAILY PRN
Status: DISCONTINUED | OUTPATIENT
Start: 2021-01-01 | End: 2021-01-01 | Stop reason: HOSPADM

## 2021-01-01 RX ORDER — MAGNESIUM SULFATE 100 %
4 CRYSTALS MISCELLANEOUS AS NEEDED
Status: DISCONTINUED | OUTPATIENT
Start: 2021-01-01 | End: 2021-01-01 | Stop reason: HOSPADM

## 2021-01-01 RX ORDER — MONTELUKAST SODIUM 4 MG/1
1 TABLET, CHEWABLE ORAL 2 TIMES DAILY
COMMUNITY

## 2021-01-01 RX ORDER — LANOLIN ALCOHOL/MO/W.PET/CERES
6 CREAM (GRAM) TOPICAL
Status: DISCONTINUED | OUTPATIENT
Start: 2021-01-01 | End: 2021-01-01 | Stop reason: HOSPADM

## 2021-01-01 RX ORDER — ESCITALOPRAM OXALATE 10 MG/1
20 TABLET ORAL DAILY
Status: DISCONTINUED | OUTPATIENT
Start: 2021-01-01 | End: 2021-01-01 | Stop reason: HOSPADM

## 2021-01-01 RX ORDER — ONDANSETRON 4 MG/1
4 TABLET, FILM COATED ORAL
Qty: 14 TABLET | Refills: 0 | Status: SHIPPED | OUTPATIENT
Start: 2021-01-01

## 2021-01-01 RX ORDER — POLYETHYLENE GLYCOL 3350 17 G/17G
17 POWDER, FOR SOLUTION ORAL
Status: DISCONTINUED | OUTPATIENT
Start: 2021-01-01 | End: 2021-01-01 | Stop reason: HOSPADM

## 2021-01-01 RX ORDER — IPRATROPIUM BROMIDE AND ALBUTEROL SULFATE 2.5; .5 MG/3ML; MG/3ML
3 SOLUTION RESPIRATORY (INHALATION)
Status: DISCONTINUED | OUTPATIENT
Start: 2021-01-01 | End: 2021-01-01 | Stop reason: HOSPADM

## 2021-01-01 RX ORDER — BUMETANIDE 1 MG/1
2 TABLET ORAL DAILY
Status: DISCONTINUED | OUTPATIENT
Start: 2021-01-01 | End: 2021-01-01 | Stop reason: HOSPADM

## 2021-01-01 RX ORDER — ATORVASTATIN CALCIUM 20 MG/1
20 TABLET, FILM COATED ORAL DAILY
Status: DISCONTINUED | OUTPATIENT
Start: 2021-01-01 | End: 2021-01-01 | Stop reason: HOSPADM

## 2021-01-01 RX ORDER — EZETIMIBE 10 MG/1
10 TABLET ORAL DAILY
Status: DISCONTINUED | OUTPATIENT
Start: 2021-01-01 | End: 2021-01-01 | Stop reason: HOSPADM

## 2021-01-01 RX ORDER — INSULIN LISPRO 100 [IU]/ML
INJECTION, SOLUTION INTRAVENOUS; SUBCUTANEOUS
Status: DISCONTINUED | OUTPATIENT
Start: 2021-01-01 | End: 2021-01-01 | Stop reason: HOSPADM

## 2021-01-01 RX ORDER — ACETAMINOPHEN 325 MG/1
650 TABLET ORAL
Status: DISCONTINUED | OUTPATIENT
Start: 2021-01-01 | End: 2021-01-01 | Stop reason: HOSPADM

## 2021-01-01 RX ORDER — SODIUM CHLORIDE 0.9 % (FLUSH) 0.9 %
10 SYRINGE (ML) INJECTION
Status: COMPLETED | OUTPATIENT
Start: 2021-01-01 | End: 2021-01-01

## 2021-01-01 RX ORDER — EXENATIDE 2 MG/.65ML
2 INJECTION, SUSPENSION, EXTENDED RELEASE SUBCUTANEOUS
Status: ON HOLD | COMMUNITY
End: 2022-01-01 | Stop reason: ALTCHOICE

## 2021-01-01 RX ORDER — METOPROLOL SUCCINATE 25 MG/1
25 TABLET, EXTENDED RELEASE ORAL DAILY
Status: DISCONTINUED | OUTPATIENT
Start: 2021-01-01 | End: 2021-01-01 | Stop reason: HOSPADM

## 2021-01-01 RX ORDER — DEXTROSE 50 % IN WATER (D50W) INTRAVENOUS SYRINGE
12.5-25 AS NEEDED
Status: DISCONTINUED | OUTPATIENT
Start: 2021-01-01 | End: 2021-01-01 | Stop reason: HOSPADM

## 2021-01-01 RX ORDER — IPRATROPIUM BROMIDE AND ALBUTEROL SULFATE 2.5; .5 MG/3ML; MG/3ML
3 SOLUTION RESPIRATORY (INHALATION)
Status: DISCONTINUED | OUTPATIENT
Start: 2021-01-01 | End: 2021-01-01

## 2021-01-01 RX ORDER — FUROSEMIDE 10 MG/ML
20 INJECTION INTRAMUSCULAR; INTRAVENOUS ONCE
Status: COMPLETED | OUTPATIENT
Start: 2021-01-01 | End: 2021-01-01

## 2021-01-01 RX ORDER — LISINOPRIL 5 MG/1
2.5 TABLET ORAL DAILY
Status: DISCONTINUED | OUTPATIENT
Start: 2021-01-01 | End: 2021-01-01

## 2021-01-01 RX ADMIN — ESCITALOPRAM OXALATE 20 MG: 10 TABLET ORAL at 09:21

## 2021-01-01 RX ADMIN — METOPROLOL SUCCINATE 25 MG: 25 TABLET, EXTENDED RELEASE ORAL at 09:01

## 2021-01-01 RX ADMIN — IPRATROPIUM BROMIDE AND ALBUTEROL SULFATE 3 ML: .5; 3 SOLUTION RESPIRATORY (INHALATION) at 19:38

## 2021-01-01 RX ADMIN — LEVOTHYROXINE SODIUM 150 MCG: 0.15 TABLET ORAL at 06:33

## 2021-01-01 RX ADMIN — APIXABAN 2.5 MG: 2.5 TABLET, FILM COATED ORAL at 09:21

## 2021-01-01 RX ADMIN — ATORVASTATIN CALCIUM 20 MG: 20 TABLET, FILM COATED ORAL at 09:21

## 2021-01-01 RX ADMIN — EZETIMIBE 10 MG: 10 TABLET ORAL at 08:47

## 2021-01-01 RX ADMIN — IPRATROPIUM BROMIDE AND ALBUTEROL SULFATE 3 ML: .5; 3 SOLUTION RESPIRATORY (INHALATION) at 07:12

## 2021-01-01 RX ADMIN — BACITRACIN 1 PACKET: 500 OINTMENT TOPICAL at 08:27

## 2021-01-01 RX ADMIN — EZETIMIBE 10 MG: 10 TABLET ORAL at 09:00

## 2021-01-01 RX ADMIN — ATORVASTATIN CALCIUM 20 MG: 20 TABLET, FILM COATED ORAL at 09:00

## 2021-01-01 RX ADMIN — METOPROLOL SUCCINATE 25 MG: 25 TABLET, EXTENDED RELEASE ORAL at 08:44

## 2021-01-01 RX ADMIN — APIXABAN 2.5 MG: 2.5 TABLET, FILM COATED ORAL at 21:52

## 2021-01-01 RX ADMIN — LEVOTHYROXINE SODIUM 150 MCG: 0.15 TABLET ORAL at 06:36

## 2021-01-01 RX ADMIN — METOPROLOL SUCCINATE 25 MG: 25 TABLET, EXTENDED RELEASE ORAL at 09:21

## 2021-01-01 RX ADMIN — TAMSULOSIN HYDROCHLORIDE 0.4 MG: 0.4 CAPSULE ORAL at 08:48

## 2021-01-01 RX ADMIN — APIXABAN 2.5 MG: 2.5 TABLET, FILM COATED ORAL at 20:17

## 2021-01-01 RX ADMIN — LEVOTHYROXINE SODIUM 150 MCG: 0.15 TABLET ORAL at 06:47

## 2021-01-01 RX ADMIN — APIXABAN 2.5 MG: 2.5 TABLET, FILM COATED ORAL at 09:00

## 2021-01-01 RX ADMIN — Medication 10 ML: at 12:26

## 2021-01-01 RX ADMIN — ATORVASTATIN CALCIUM 20 MG: 20 TABLET, FILM COATED ORAL at 08:44

## 2021-01-01 RX ADMIN — SODIUM CHLORIDE 500 ML: 900 INJECTION, SOLUTION INTRAVENOUS at 21:35

## 2021-01-01 RX ADMIN — ESCITALOPRAM OXALATE 20 MG: 10 TABLET ORAL at 09:01

## 2021-01-01 RX ADMIN — IPRATROPIUM BROMIDE AND ALBUTEROL SULFATE 3 ML: .5; 3 SOLUTION RESPIRATORY (INHALATION) at 19:39

## 2021-01-01 RX ADMIN — APIXABAN 2.5 MG: 2.5 TABLET, FILM COATED ORAL at 21:58

## 2021-01-01 RX ADMIN — BUMETANIDE 2 MG: 1 TABLET ORAL at 09:21

## 2021-01-01 RX ADMIN — ESCITALOPRAM OXALATE 20 MG: 10 TABLET ORAL at 08:48

## 2021-01-01 RX ADMIN — APIXABAN 2.5 MG: 2.5 TABLET, FILM COATED ORAL at 09:01

## 2021-01-01 RX ADMIN — ESCITALOPRAM OXALATE 20 MG: 10 TABLET ORAL at 09:02

## 2021-01-01 RX ADMIN — APIXABAN 2.5 MG: 2.5 TABLET, FILM COATED ORAL at 08:44

## 2021-01-01 RX ADMIN — TAMSULOSIN HYDROCHLORIDE 0.4 MG: 0.4 CAPSULE ORAL at 09:01

## 2021-01-01 RX ADMIN — APIXABAN 2.5 MG: 2.5 TABLET, FILM COATED ORAL at 08:48

## 2021-01-01 RX ADMIN — BUMETANIDE 1 MG: 1 TABLET ORAL at 08:44

## 2021-01-01 RX ADMIN — FUROSEMIDE 20 MG: 10 INJECTION, SOLUTION INTRAMUSCULAR; INTRAVENOUS at 18:23

## 2021-01-01 RX ADMIN — ASPIRIN 325 MG ORAL TABLET 325 MG: 325 PILL ORAL at 19:20

## 2021-01-01 RX ADMIN — EZETIMIBE 10 MG: 10 TABLET ORAL at 08:43

## 2021-01-01 RX ADMIN — TAMSULOSIN HYDROCHLORIDE 0.4 MG: 0.4 CAPSULE ORAL at 09:21

## 2021-01-01 RX ADMIN — ATORVASTATIN CALCIUM 20 MG: 20 TABLET, FILM COATED ORAL at 08:48

## 2021-01-01 RX ADMIN — APIXABAN 2.5 MG: 2.5 TABLET, FILM COATED ORAL at 18:24

## 2021-01-01 RX ADMIN — METOPROLOL SUCCINATE 25 MG: 25 TABLET, EXTENDED RELEASE ORAL at 09:00

## 2021-01-01 RX ADMIN — TAMSULOSIN HYDROCHLORIDE 0.4 MG: 0.4 CAPSULE ORAL at 08:44

## 2021-01-01 RX ADMIN — ATORVASTATIN CALCIUM 20 MG: 20 TABLET, FILM COATED ORAL at 09:01

## 2021-01-01 RX ADMIN — EZETIMIBE 10 MG: 10 TABLET ORAL at 09:21

## 2021-01-01 RX ADMIN — TETANUS TOXOID, REDUCED DIPHTHERIA TOXOID AND ACELLULAR PERTUSSIS VACCINE, ADSORBED 0.5 ML: 5; 2.5; 8; 8; 2.5 SUSPENSION INTRAMUSCULAR at 08:27

## 2021-01-01 RX ADMIN — LEVOTHYROXINE SODIUM 150 MCG: 0.15 TABLET ORAL at 07:04

## 2021-01-01 RX ADMIN — ESCITALOPRAM OXALATE 20 MG: 10 TABLET ORAL at 08:44

## 2021-01-01 RX ADMIN — BUMETANIDE 1 MG: 1 TABLET ORAL at 09:01

## 2021-01-01 RX ADMIN — IPRATROPIUM BROMIDE AND ALBUTEROL SULFATE 3 ML: .5; 3 SOLUTION RESPIRATORY (INHALATION) at 16:16

## 2021-01-01 RX ADMIN — EZETIMIBE 10 MG: 10 TABLET ORAL at 09:01

## 2021-01-01 RX ADMIN — ACETAMINOPHEN 650 MG: 325 TABLET ORAL at 19:20

## 2021-01-01 RX ADMIN — IPRATROPIUM BROMIDE AND ALBUTEROL SULFATE 3 ML: .5; 3 SOLUTION RESPIRATORY (INHALATION) at 12:09

## 2021-01-01 RX ADMIN — IPRATROPIUM BROMIDE AND ALBUTEROL SULFATE 3 ML: .5; 3 SOLUTION RESPIRATORY (INHALATION) at 19:32

## 2021-01-01 RX ADMIN — LISINOPRIL 2.5 MG: 5 TABLET ORAL at 18:24

## 2021-01-01 RX ADMIN — ONDANSETRON 4 MG: 2 INJECTION INTRAMUSCULAR; INTRAVENOUS at 06:14

## 2021-01-01 RX ADMIN — APIXABAN 2.5 MG: 2.5 TABLET, FILM COATED ORAL at 21:43

## 2021-10-25 NOTE — ED TRIAGE NOTES
Patient arrives via EMS for left sided CP and \"some shortness of breath\" that began when he woke up this morning.

## 2021-10-25 NOTE — ED PROVIDER NOTES
68-year-old male presents from home via EMS with a complaint of chest pain. Patient woke up with left-sided chest pain this morning around 8 AM.  Described as a sharp stabbing sensation in the left side of his chest to go straight through to his left shoulder blade. No exacerbating or alleviating factors. Patient took some Tylenol earlier today but with no relief of the symptoms. He reports having some shortness of breath which is no change from his baseline. Denies any cough, fever, nausea, vomiting, diaphoresis. Patient was getting ready for bed this evening and noticed that he was still having the pain and decided to get checked out. His past medical history is significant for atrial fibrillation, defibrillator, on Eliquis, chronic kidney disease, diabetes, hypertension. No known coronary artery disease.            Past Medical History:   Diagnosis Date    Arrhythmia     AFib    Arthritis     Atrial fibrillation ([address]     Cancer ([address]     right lung, skin cancer, bladder (instilled medication in bladder for tx)    Chronic kidney disease     [address] [address]     NIDDM    Gout     Hypertension     Hypothyroidism     Ill-defined condition     neuropathy in feet    Other ill-defined conditions(799.89)     high cholesterol    Pacemaker     Psychiatric disorder     depression    Sleep apnea     uses CPAP       Past Surgical History:   Procedure Laterality Date    CARDIAC SURG PROCEDURE UNLIST  2010    ablation,  Pacer/defibralator    COLONOSCOPY N/A 8/8/2017    [facility] at [facility] END[facility]COPY    COLONOSCOPY N/A 2/22/2019    COLONOSCOPY performed by Whitney Moran MD at [facility] ENDOSCOPY    ECHO 2D ADULT  9/2009    LVH, LAE, mild MR, EF 55-60%    HX CATARACT REMOVAL Bilateral     with lens implants    HX HEART CATHETERIZATION  2009    HX HEENT      tonsillectomy    HX KNEE ARTHROSCOPY Right     menisectomy    HX LUMBAR LAMINECTOMY      back surgery / hardware  HX OTHER SURGICAL  14    BRONCHOSCOPY/RIGHT VIDEO ASSISTED THORACOSCOPY,  2.RIGHT UPPER LOBE WEDGE RESECTION 3. COMPLETION RIGHT UPPER LOBECTOMY 4. MEDIASTINAL LYMPH NODE DISSECTION 5. INTERCOSTAL BLOCK    HX OTHER SURGICAL  14    Right Thoracoscopy, Ligation of inferior pulmonary ligament bleeding with prolene/clips    HX PACEMAKER Left     AICD--Dr. Alicia Rodriguez    HX UROLOGICAL      bladder tumor    STRESS TEST CARDIOLITE  2010    exercise/lexiscan study - poor exercise capacity, normal perfusion, EF 62%    THORACOSCOPY SURG WEDG RESEC LUNG  2015    RUL         Family History:   Problem Relation Age of Onset    Cancer Mother         pancreatic    Cancer Father         colon    Cancer Sister         uterine    Cancer Brother         lung, bladder       Social History     Socioeconomic History    Marital status:      Spouse name: Not on file    Number of children: Not on file    Years of education: Not on file    Highest education level: Not on file   Occupational History    Not on file   Tobacco Use    Smoking status: Former Smoker     Packs/day: 2.50     Years: 25.00     Pack years: 62.50     Types: Cigarettes     Quit date: 1975     Years since quittin.3    Smokeless tobacco: Never Used    Tobacco comment: quit    Substance and Sexual Activity    Alcohol use: No     Alcohol/week: 0.0 standard drinks    Drug use: No    Sexual activity: Not on file   Other Topics Concern    Not on file   Social History Narrative    Not on file     Social Determinants of Health     Financial Resource Strain:     Difficulty of Paying Living Expenses:    Food Insecurity:     Worried About Running Out of Food in the Last Year:     [address] in the Last Year:    Transportation Needs:     Lack of Transportation (Medical):      Lack of Transportation (Non-Medical):    Physical Activity:     Days of Exercise per Week:     Minutes of Exercise per Session:    Stress:  Feeling of Stress :    Social Connections:     Frequency of Communication with Friends and Family:     Frequency of Social Gatherings with Friends and Family:     Attends Buddhist Services:     Active Member of Clubs or Organizations:     Attends Club or Organization Meetings:     Marital Status:    Intimate Partner Violence:     Fear of Current or Ex-Partner:     Emotionally Abused:     Physically Abused:     Sexually Abused: ALLERGIES: Patient has no known allergies. Review of Systems   Constitutional: Negative for diaphoresis and fever. HENT: Negative for facial swelling. Eyes: Negative for visual disturbance. Respiratory: Negative for cough. Cardiovascular: Positive for chest pain. Gastrointestinal: Negative for abdominal pain. Genitourinary: Negative for dysuria. Musculoskeletal: Negative for joint swelling. Skin: Negative for rash. Neurological: Negative for headaches. Hematological: Negative for adenopathy. Psychiatric/Behavioral: Negative for suicidal ideas. There were no vitals filed for this visit. Physical Exam  Vitals and nursing note reviewed. Constitutional:       General: He is not in acute distress. Appearance: He is well-developed. He is obese. HENT:      Head: Normocephalic and atraumatic. Eyes:      Pupils: Pupils are equal, round, and reactive to light. Cardiovascular:      Rate and Rhythm: Normal rate. Heart sounds: Normal heart sounds. Pulmonary:      Effort: Pulmonary effort is normal. No respiratory distress. Chest:      Chest wall: Tenderness (+ ttp over L chest wall) present. Abdominal:      General: There is no distension. Musculoskeletal:         General: Normal range of motion. Cervical back: Normal range of motion and neck supple. Skin:     General: Skin is warm and dry. Neurological:      Mental Status: He is alert and oriented to person, place, and time.           MDM  Number of Diagnoses or Management Options  Musculoskeletal chest pain  Diagnosis management comments: Assessment: Patient presenting with atypical chest pain. Sharp pain in his left chest wall that was present when he woke up this morning and has been continuous throughout the day. No exacerbating or alleviating factors. Vital signs stable. Troponin was mildly elevated but with no significant change on repeat. Given that has been having symptoms all day with no significant change in his troponin I think that is highly unlikely be cardiac in etiology. His normal vital signs as well as the fact he is already on Eliquis I think make it highly unlikely that this is a blood clot. Nothing any further testing is needed at this time. His creatinine was somewhat elevated at 3.25. I discussed this with the patient and his daughter who informed me that the patient's GFR has been running in the 16-17 range and has been following with nephrology for this. Does not seem to be any other acute changes on patient's blood work. He reports feeling better and comfortable going back to his living facility. He has appointment scheduled this Friday with his cardiologist.  He will return to the ED sooner if he has any worsening symptoms. Amount and/or Complexity of Data Reviewed  Clinical lab tests: reviewed  Tests in the radiology section of CPT®: reviewed  Tests in the medicine section of CPT®: reviewed      ED Course as of Oct 25 2211   Mon Oct 25, 2021   1923 ED EKG interpretation:  Rhythm: v-paced. Rate (approx.): 91. Axis: normal.  ST segment:  No concerning ST elevations or depressions. This EKG was interpreted by Carlitos Oro MD,ED Provider.        EKG 12 LEAD INITIAL [JM]      ED Course User Index  [JM] oLtus Corral MD       Procedures

## 2021-10-31 NOTE — ED TRIAGE NOTES
Pt. State he was sleeping and dreaming and fell out of bed. Pt. Complains of nose pain after striking the floor with his c-pap on. No LOC, no neck pain back or head pain.

## 2021-10-31 NOTE — ED PROVIDER NOTES
72-year-old gentleman with a history of atrial fibrillation, lung cancer, DM, HTN, presents to the emergency department by EMS from his assisted living facility where he states that he was asleep and having a dream where he was fighting which caused him to move suddenly and rolled out of bed. He reportedly then landed face down on the floor with his CPAP mask on which then was knocked against his nose. He had a nosebleed from his bilateral nostrils after this fall lasted for a few minutes but seems to have stopped now. He sustained a few skin tears to his left hand, right forearm, right elbow but notes full range of motion of these extremities denies any numbness, weakness or decreased range of motion or significant pain. States that he is not sure when his last tetanus shot was and states that it probably needs to be updated. He denies any neck pain back pain, hip pain, or lower extremity injuries. Denies any LOC, amnesia to the event, nausea, vomiting, headache. Denies any significant pain on exam.  He is GCS 15 with vital signs stable while in route by EMS.            Past Medical History:   Diagnosis Date    Arrhythmia     AFib    Arthritis     Atrial fibrillation ([address]     Cancer ([address]     right lung, skin cancer, bladder (instilled medication in bladder for tx)    Chronic kidney disease     Diabetes ([address]     NIDDM    Gout     Hypertension     Hypothyroidism     Ill-defined condition     neuropathy in feet    Other ill-defined conditions(799.89)     high cholesterol    Pacemaker     Psychiatric disorder     depression    Sleep apnea     uses CPAP       Past Surgical History:   Procedure Laterality Date    CARDIAC SURG PROCEDURE UNLIST  2010    ablation,  Pacer/defibralator    COLONOSCOPY N/A 8/8/2017    COLONOSCOPY performed by Michelle Smith MD at [facility] ENDOSCOPY    COLONOSCOPY N/A 2/22/2019    COLONOSCOPY performed by Ottoniel Arceo MD at [facility] ENDOSCOPY    ECHO 2D ADULT  9/2009 LVH, LAE, mild MR, EF 55-60%    HX CATARACT REMOVAL Bilateral     with lens implants    HX HEART CATHETERIZATION  2009    HX HEENT      tonsillectomy    HX KNEE ARTHROSCOPY Right     menisectomy    HX LUMBAR LAMINECTOMY      back surgery / hardware    HX OTHER SURGICAL  14    BRONCHOSCOPY/RIGHT VIDEO ASSISTED THORACOSCOPY,  2.RIGHT UPPER LOBE WEDGE RESECTION 3. COMPLETION RIGHT UPPER LOBECTOMY 4. MEDIASTINAL LYMPH NODE DISSECTION 5. INTERCOSTAL BLOCK    HX OTHER SURGICAL  14    Right Thoracoscopy, Ligation of inferior pulmonary ligament bleeding with prolene/clips    HX PACEMAKER Left     AICD--Dr. Alicia Rodriguez    HX UROLOGICAL      bladder tumor    STRESS TEST CARDIOLITE  2010    exercise/lexiscan study - poor exercise capacity, normal perfusion, EF 62%    THORACOSCOPY SURG WEDG RESEC LUNG  2015    RUL         Family History:   Problem Relation Age of Onset    Cancer Mother         pancreatic    Cancer Father         colon    Cancer Sister         uterine    Cancer Brother         lung, bladder       Social History     Socioeconomic History    Marital status:      Spouse name: Not on file    Number of children: Not on file    Years of education: Not on file    Highest education level: Not on file   Occupational History    Not on file   Tobacco Use    Smoking status: Former Smoker     Packs/day: 2.50     Years: 25.00     Pack years: 62.50     Types: Cigarettes     Quit date: 1975     Years since quittin.4    Smokeless tobacco: Never Used    Tobacco comment: quit    Substance and Sexual Activity    Alcohol use: No     Alcohol/week: 0.0 standard drinks    Drug use: No    Sexual activity: Not on file   Other Topics Concern    Not on file   Social History Narrative    Not on file     Social Determinants of Health     Financial Resource Strain:     Difficulty of Paying Living Expenses:    Food Insecurity:     Worried About Running Out of Food in the Last Year:     Ran Out of Food in the Last Year:    Transportation Needs:     Lack of Transportation (Medical):  Lack of Transportation (Non-Medical):    Physical Activity:     Days of Exercise per Week:     Minutes of Exercise per Session:    Stress:     Feeling of Stress :    Social Connections:     Frequency of Communication with Friends and Family:     Frequency of Social Gatherings with Friends and Family:     Attends [employer]ism Services:     Active Member of Clubs or Organizations:     Attends Club or Organization Meetings:     Marital Status:    Intimate Partner Violence:     Fear of Current or Ex-Partner:     Emotionally Abused:     Physically Abused:     Sexually Abused: ALLERGIES: Patient has no known allergies. Review of Systems   Constitutional: Negative for activity change, appetite change, chills and fever. HENT: Positive for facial swelling (Nose) and nosebleeds. Negative for congestion, rhinorrhea, sinus pain, sneezing and sore throat. Eyes: Negative for photophobia and visual disturbance. Respiratory: Negative for cough and shortness of breath. Cardiovascular: Negative for chest pain. Gastrointestinal: Negative for abdominal pain, blood in stool, constipation, diarrhea, nausea and vomiting. Genitourinary: Negative for difficulty urinating, dysuria, flank pain, hematuria, penile pain and testicular pain. Musculoskeletal: Negative for arthralgias, back pain, myalgias and neck pain. Skin: Positive for wound (Skin tears to left hand, right forearm and right elbow). Negative for rash. Neurological: Negative for syncope, weakness, light-headedness, numbness and headaches. Psychiatric/Behavioral: Negative for self-injury and suicidal ideas. All other systems reviewed and are negative. Vitals:    10/31/21 0809   Pulse: (P) 90   Resp: (P) 16            Physical Exam  Vitals and nursing note reviewed.    Constitutional:       General: He is not in acute distress. Appearance: Normal appearance. He is well-developed. He is not diaphoretic. Comments: Pleasant, no acute distress. Speaking full sentences. HENT:      Head: Normocephalic and atraumatic. Nose: Signs of injury and nasal tenderness present. No nasal deformity, septal deviation or laceration. Right Nostril: Epistaxis present. No septal hematoma or occlusion. Left Nostril: Epistaxis present. No septal hematoma or occlusion. Comments: Evidence of recent epistaxis, now hemostatic. Mouth/Throat:      Comments: No malocclusion, no trismus, fluent speech. No midface instability. Eyes:      Extraocular Movements: Extraocular movements intact. Conjunctiva/sclera: Conjunctivae normal.      Pupils: Pupils are equal, round, and reactive to light. Comments: No evidence of entrapment. Cardiovascular:      Rate and Rhythm: Normal rate and regular rhythm. Heart sounds: Normal heart sounds. Pulmonary:      Effort: Pulmonary effort is normal.      Breath sounds: Normal breath sounds. Abdominal:      General: There is no distension. Palpations: Abdomen is soft. Tenderness: There is no abdominal tenderness. Musculoskeletal:         General: No tenderness. Cervical back: Neck supple. Skin:     General: Skin is warm and dry. Findings: Abrasion present. Neurological:      General: No focal deficit present. Mental Status: He is alert and oriented to person, place, and time. Cranial Nerves: No cranial nerve deficit. Sensory: No sensory deficit. Motor: No weakness. Coordination: Coordination normal.      Comments: Intact sensation, 5/5 strength in all 4 extremities, intact finger to nose, neg pronator drift, fluent speech, CN intact. MDM   72-year-old male presents with fall from bed. Facial trauma suggest that he fell on his face on his CPAP mask and had predominantly nasal trauma.     CT head shows no acute intracranial abnormalities. CT maxillofacial shows no facial fractures. His wounds were cleaned and dressed with antibiotic ointment in the ED. reassurance was given. Recommended PCP follow-up for further evaluation as needed and return precautions were given for worsening or concerns. His daughter at the bedside states that they have new bed rails that they were going to put up on his bed at home in order to try to prevent him from falling out of bed again. Feel this is appropriate intervention.    Procedures

## 2021-10-31 NOTE — ED NOTES
Pt. Has an approximate 5cm skin tear to top of left hand, three small time size skin tears to right forearm and elbow area. All wounds cleaned, mepitel placed and wound dressing gel and 4x4 and wrapped with patti.

## 2021-12-12 PROBLEM — R79.81 BORDERLINE LOW O2 SATURATION: Status: ACTIVE | Noted: 2021-01-01

## 2021-12-12 PROBLEM — R42 DIZZINESS: Status: ACTIVE | Noted: 2021-01-01

## 2021-12-12 PROBLEM — R06.02 SOB (SHORTNESS OF BREATH): Status: ACTIVE | Noted: 2021-01-01

## 2021-12-12 PROBLEM — R55 NEAR SYNCOPE: Status: ACTIVE | Noted: 2021-01-01

## 2021-12-12 PROBLEM — I50.9 CHF EXACERBATION (HCC): Status: ACTIVE | Noted: 2021-01-01

## 2021-12-12 PROBLEM — R55 SYNCOPE, NEAR: Status: ACTIVE | Noted: 2021-01-01

## 2021-12-12 NOTE — ED NOTES
Daughter states \"patient has been having issues with vomiting or diarrhea for about 1.5 months. Has been to gastroenterologist and is scheduled for endoscopy/colonoscopy January 20th. Patient states has been having issue with keeping food down. Vomited yesterday with near syncopal episodes. Also has been experiencing shortness of breath especially when he lays down. Daughter states he begins panting. Has seen pulmonologist for breathing and was on Trelegy inhaler for 3 days but did not use yesterday or today. Was playing Bingo yesterday and had 5 episodes of near syncope with dizziness. No chest pain, headache or abdominal pain. Alert, oriented x 4. Pitting edema bilateral lower extremities. Dyspnea on exertion noted. Patient states normally walks with a walker but has had increased fatigue past couple of days and needed wheelchair today.

## 2021-12-12 NOTE — ED TRIAGE NOTES
Vomiting and diarrhea for some time scheduled for colonoscopy/endoscopy 1/20/2021.  5 episodes near syncope yesterday with dizziness and vomiting. Also complaints of shortness of breath per daughter but stopped taking the Trelegy inhaler 2 days ago.

## 2021-12-12 NOTE — H&P
Hospitalist Admission Note    NAME: Preet Barr. :  1935   MRN:  [id number]     Date/Time:  2021 6:02 PM    Patient PCP: Diego Melchor MD  ______________________________________________________________________  Given the patient's current clinical presentation, I have a high level of concern for decompensation if discharged from the emergency department. Complex decision making was performed, which includes reviewing the patient's available past medical records, laboratory results, and x-ray films. My assessment of this patient's clinical condition and my plan of care is as follows. Assessment / Plan:  81 y/o male with verbal report by daughter describing hx of systolic CHF (low EF requiring defibrillator, cardiologist Todd Pineda from [facility]), hypothyroidism (on replacement tx), atrial fibrillation (on apixaban), renal insufficiency (from past and recent labs), diabetes, noninfectious diarrhea (worked up by outside [employer]), who presented in ER for near syncope/dizziness/sob/generalized weakness,etc. O2 sat reduced to low 90's, CXR initially reporting right pleural effusion and pulm htn? . Troponin negative x2, but elevated d-dimer and probnp. Pt is admitted for chf exacerbation with eval of other items of complaints.     SOB thought to be primarily systolic chf exacerbation  -pt describes orthopnea  -cxr report noted  -IV diuresis (hold home bumex)  -guideline for supplemental O2 if needed  -breathing tx  -repeat cxr in am  -echo  -strict I/O, fluid restrict  -cardiology consult ordered    R/o PE  -dopplers negative for DVT  -pending V/Q scan    Hx of Lung cancer s/p resection hx  -refer to above regarding tx   -pt/daughter denies recurrence    Atrial fibrillation  -rate controlled  -on metoprolol  -on apixaban    Hypothyroidism  -tsh very minimally above normal range  -would keep the current levothyroxine home dose and recommend repeat as outpatient when pt is stable    Urinary hesitancy  -resume home flomax    Hx of noninfectious diarrhea  -resume prn colestipol    Diabetes  -ssi with accuchecks and carb consistent diet    Dyslipidemia history  -presumed as pt on home low dose lipitor and zetia    Renal insufficiency  -known by daughter  -srcr around 3.5, trend    Anemia, chronic and in setting of chronic renal insufficiency    Mood disorder history?  -pt on lexapro at home    See other orders for plan    Code Status: full  Surrogate Decision Maker: daughter Judy Hernández [phone]    DVT Prophylaxis: already on apixaban for reasons above        Baseline: independent      Subjective:   CHIEF COMPLAINT: sob, near syncope, dizziness, verbal description of orthopnea    HISTORY OF PRESENT ILLNESS:     81 y/o male with verbal report by daughter describing hx of systolic CHF (low EF requiring defibrillator, cardiologist  from [facility]), hypothyroidism (on replacement tx), atrial fibrillation (on apixaban), renal insufficiency (from past and recent labs), diabetes, noninfectious diarrhea (worked up by outside [employer]), who presented in ER for near syncope/dizziness/sob/generalized weakness,etc. O2 sat reduced to low 90's, CXR initially reporting right pleural effusion and pulm htn? . Troponin negative x2, but elevated d-dimer and probnp. Pt is admitted for chf exacerbation with eval of other items of complaints. We were asked to admit for work up and evaluation of the above problems.      Past Medical History:   Diagnosis Date    Arrhythmia     AFib    Arthritis     [address] [facility] ([facility]     Cancer (HCC)     right lung, skin cancer, bladder (instilled medication in bladder for tx)    Chronic kidney disease     Diabetes ([facility]     NIDDM    Gout     Hypertension     Hypothyroidism     Ill-defined condition     neuropathy in feet    Other ill-defined conditions(799.89)     high cholesterol    Pacemaker     Psychiatric disorder     depression    Sleep apnea     uses CPAP        Past Surgical History:   Procedure Laterality Date    COLONOSCOPY N/A 2017    COLON[facility]anos MD [facility] ENDOSCOPY    COLONOSCOPY N/A 2019    COLONOSCOPY performed by Musa Marti MD at [facility] ENDOSCOPY    ECHO 2D ADULT  2009    LVH, LAE, mild MR, EF 55-60%    HX CATARACT REMOVAL Bilateral     with lens implants    HX HEART CATHETERIZATION      HX HEENT      tonsillectomy    HX KNEE ARTHROSCOPY Right     menisectomy    HX LUMBAR LAMINECTOMY      back surgery / hardware    HX OTHER SURGICAL  14    BRONCHOSCOPY/RIGHT VIDEO ASSISTED THORACOSCOPY,  2.RIGHT UPPER LOBE WEDGE RESECTION 3. COMPLETION RIGHT UPPER LOBECTOMY 4. MEDIASTINAL LYMPH NODE DISSECTION 5. INTERCOSTAL BLOCK    HX OTHER SURGICAL  14    Right Thoracoscopy, Ligation of inferior pulmonary ligament bleeding with prolene/clips    HX PACEMAKER Left     AICD--Dr. Hester Class    HX UROLOGICAL      bladder tumor    MO CARDIAC SURG PROCEDURE UNLIST      ablation,  Pacer/defibralator    STRESS TEST CARDIOLITE  2010    exercise/lexiscan study - poor exercise capacity, normal perfusion, EF 62%    THORACOSCOPY SURG WEDG RESEC LUNG  2015    RUL       Social History     Tobacco Use    Smoking status: Former Smoker     Packs/day: 2.50     Years: 25.00     Pack years: 62.50     Types: Cigarettes     Quit date: 1975     Years since quittin.5    Smokeless tobacco: Never Used    Tobacco comment: quit    Substance Use Topics    Alcohol use: No     Alcohol/week: 0.0 standard drinks        Family History   Problem Relation Age of Onset    Cancer Mother         pancreatic    Cancer Father         colon    Cancer Sister         uterine    Cancer Brother         lung, bladder     No Known Allergies     Prior to Admission medications    Medication Sig Start Date End Date Taking?  Authorizing Provider   exenatide microspheres (Bydureon) 2 mg/0.65 mL pnij 2 mg by SubCUTAneous route every seven (7) days. Thursday   Yes Other, MD Manoj   apixaban (ELIQUIS) 2.5 mg tablet Take 2.5 mg by mouth two (2) times a day. Yes Provider, Historical   Cholecalciferol, Vitamin D3, (VITAMIN D3) 2,000 unit cap capsule Take 5,000 Units by mouth daily. Yes Provider, Historical   tamsulosin (FLOMAX) 0.4 mg capsule Take 0.4 mg by mouth daily. 11/10/16  Yes Provider, Historical   metoprolol succinate (TOPROL-XL) 25 mg XL tablet Take 25 mg by mouth nightly. 11/18/15  Yes Provider, Historical   bumetanide (BUMEX) 0.5 mg tablet Take 0.5 mg by mouth daily. 5/11/15  Yes Provider, Historical   SYNTHROID 150 mcg tablet 150 mcg daily. 4/29/15  Yes Provider, Historical   escitalopram (LEXAPRO) 20 mg tablet Take 20 mg by mouth daily. Yes Provider, Historical   ezetimibe (ZETIA) 10 mg tablet take 1 tablet by mouth once daily 7/8/11  Yes Esvin Burns MD   sitagliptin (JANUVIA) 50 mg tablet Take 50 mg by mouth daily. Yes Provider, Historical   atorvastatin (LIPITOR) 20 mg tablet Take 20 mg by mouth daily. Yes Provider, Historical   acetaminophen (TYLENOL EXTRA STRENGTH) 500 mg tablet Take 500 mg by mouth every six (6) hours as needed for Pain. Patient not taking: Reported on 12/12/2021    Provider, Historical   colchicine 0.6 mg tablet Take 0.6 mg by mouth daily as needed. Patient not taking: Reported on 12/12/2021    Provider, Historical       REVIEW OF SYSTEMS:     I am not able to complete the review of systems because:    The patient is intubated and sedated    The patient has altered mental status due to his acute medical problems    The patient has baseline aphasia from prior stroke(s)    The patient has baseline dementia and is not reliable historian    The patient is in acute medical distress and unable to provide information           Total of 12 systems reviewed as follows:       POSITIVE= bold  Negative = text not underlined  General:  fever, chills, sweats, generalized weakness, weight loss/gain,      loss of appetite   Eyes:    blurred vision, eye pain, loss of vision, double vision  ENT:    rhinorrhea, pharyngitis   Respiratory:   cough, sputum production, SOB, GUTIÉRREZ, wheezing, pleuritic pain   Cardiology:   chest pain, palpitations, orthopnea, PND, edema, syncope   Gastrointestinal:  abdominal pain , N/V, diarrhea, dysphagia, constipation, bleeding   Genitourinary:  frequency, urgency, dysuria, hematuria, incontinence   Muskuloskeletal :  arthralgia, myalgia, back pain  Hematology:  easy bruising, nose or gum bleeding, lymphadenopathy   Dermatological: rash, ulceration, pruritis, color change / jaundice  Endocrine:   hot flashes or polydipsia   Neurological:  headache, dizziness, confusion, focal weakness, paresthesia,     Speech difficulties, memory loss, gait difficulty  Psychological: Feelings of anxiety, depression, agitation    Objective:   VITALS:    Visit Vitals  BP (!) 155/75 (BP 1 Location: Left upper arm, BP Patient Position: Lying)   Pulse 84   Temp 98.1 °F (36.7 °C)   Resp 20   Ht 5' 7\" (1.702 m)   Wt 97.5 kg (215 lb)   SpO2 93%   BMI 33.67 kg/m²       PHYSICAL EXAM:    General:    Alert, cooperative, no distress, appears stated age. HEENT: Atraumatic, anicteric sclerae, pink conjunctivae  Neck:  Supple, symmetrical   Lungs:   Clear to auscultation bilaterally. No Wheezing or Rhonchi. No rales. Chest wall:  No tenderness  No Accessory muscle use. Heart:   Irregular,  No edema  Abdomen:   Soft, non-tender. Not distended. Bowel sounds normal  Extremities: No cyanosis. Symmetrical muscle tone, no pitting edema       Skin:     Not Jaundiced    Psych:  Does not voice si/hi. Not anxious or agitated. Neurologic: No facial asymmetry. No aphasia or slurred speech.  Alert/oriented, moves all extremities  _______________________________________________________________________  Care Plan discussed with:    Comments   Patient x    Family  x    RN x    Care Manager Consultant:      _______________________________________________________________________  Expected  Disposition:   Home with Family x   HH/PT/OT/RN    SNF/LTC    [facility]    ________________________________________________________________________  TOTAL TIME:  48 Minutes    Critical Care Provided     Minutes non procedure based      Comments     Reviewed previous records   >50% of visit spent in counseling and coordination of care  Discussion with patient and/or family and questions answered       ________________________________________________________________________  Signed: Narciso Partida MD    Procedures: see electronic medical records for all procedures/Xrays and details which were not copied into this note but were reviewed prior to creation of Plan. LAB DATA REVIEWED:    Recent Results (from the past 24 hour(s))   SAMPLES BEING HELD    Collection Time: 12/12/21 11:06 AM   Result Value Ref Range    SAMPLES BEING HELD 1PINK, 1RED     COMMENT        Add-on orders for these samples will be processed based on acceptable specimen integrity and analyte stability, which may vary by analyte. NT-PRO BNP    Collection Time: 12/12/21 11:06 AM   Result Value Ref Range    NT pro-BNP 10,901 (H) 0 - 450 PG/ML   MAGNESIUM    Collection Time: 12/12/21 11:06 AM   Result Value Ref Range    Magnesium 2.1 1.6 - 2.4 mg/dL   METABOLIC PANEL, COMPREHENSIVE    Collection Time: 12/12/21 11:06 AM   Result Value Ref Range    Sodium 140 136 - 145 mmol/L    Potassium 3.9 3.5 - 5.1 mmol/L    Chloride 102 97 - 108 mmol/L    CO2 30 21 - 32 mmol/L    Anion gap 8 5 - 15 mmol/L    Glucose 142 (H) 65 - 100 mg/dL    BUN 48 (H) 6 - 20 MG/DL    Creatinine 3.56 (H) 0.70 - 1.30 MG/DL    BUN/Creatinine ratio 13 12 - 20      GFR est AA 20 (L) >60 ml/min/1.73m2    GFR est non-AA 16 (L) >60 ml/min/1.73m2    Calcium 8.7 8.5 - 10.1 MG/DL    Bilirubin, total 0.7 0.2 - 1.0 MG/DL    ALT (SGPT) 37 12 - 78 U/L    AST (SGOT) 25 15 - 37 U/L    Alk. phosphatase 81 45 - 117 U/L    Protein, total 6.8 6.4 - 8.2 g/dL    Albumin 3.2 (L) 3.5 - 5.0 g/dL    Globulin 3.6 2.0 - 4.0 g/dL    A-G Ratio 0.9 (L) 1.1 - 2.2     CBC WITH AUTOMATED DIFF    Collection Time: 12/12/21 11:06 AM   Result Value Ref Range    WBC 6.2 4.1 - 11.1 K/uL    RBC 3.14 (L) 4.10 - 5.70 M/uL    HGB 9.5 (L) 12.1 - 17.0 g/dL    HCT 30.4 (L) 36.6 - 50.3 %    MCV 96.8 80.0 - 99.0 FL    MCH 30.3 26.0 - 34.0 PG    MCHC 31.3 30.0 - 36.5 g/dL    RDW 14.6 (H) 11.5 - 14.5 %    PLATELET 401 (L) 270 - 400 K/uL    MPV 9.4 8.9 - 12.9 FL    NRBC 0.0 0  WBC    ABSOLUTE NRBC 0.00 0.00 - 0.01 K/uL    NEUTROPHILS 70 32 - 75 %    LYMPHOCYTES 20 12 - 49 %    MONOCYTES 7 5 - 13 %    EOSINOPHILS 2 0 - 7 %    BASOPHILS 0 0 - 1 %    IMMATURE GRANULOCYTES 1 (H) 0.0 - 0.5 %    ABS. NEUTROPHILS 4.4 1.8 - 8.0 K/UL    ABS. LYMPHOCYTES 1.2 0.8 - 3.5 K/UL    ABS. MONOCYTES 0.4 0.0 - 1.0 K/UL    ABS. EOSINOPHILS 0.1 0.0 - 0.4 K/UL    ABS. BASOPHILS 0.0 0.0 - 0.1 K/UL    ABS. IMM.  GRANS. 0.0 0.00 - 0.04 K/UL    DF AUTOMATED     D DIMER    Collection Time: 12/12/21 11:06 AM   Result Value Ref Range    D-dimer 8.14 (H) 0.00 - 0.65 mg/L FEU   TROPONIN-HIGH SENSITIVITY    Collection Time: 12/12/21 11:06 AM   Result Value Ref Range    Troponin-High Sensitivity 32 0 - 76 ng/L   TSH 3RD GENERATION    Collection Time: 12/12/21 11:06 AM   Result Value Ref Range    TSH 4.47 (H) 0.36 - 3.74 uIU/mL   URINALYSIS W/MICROSCOPIC    Collection Time: 12/12/21 12:02 PM   Result Value Ref Range    Color YELLOW/STRAW      Appearance CLEAR CLEAR      Specific gravity 1.015 1.003 - 1.030      pH (UA) 5.5 5.0 - 8.0      Protein 100 (A) NEG mg/dL    Glucose Negative NEG mg/dL    Ketone Negative NEG mg/dL    Bilirubin Negative NEG      Blood SMALL (A) NEG      Urobilinogen 0.2 0.2 - 1.0 EU/dL    Nitrites Negative NEG      Leukocyte Esterase Negative NEG      WBC 0-4 0 - 4 /hpf    RBC 0-5 0 - 5 /hpf    Epithelial cells FEW FEW /lpf    Bacteria Negative NEG /hpf    Hyaline cast 2-5 0 - 5 /lpf   URINE CULTURE HOLD SAMPLE    Collection Time: 12/12/21 12:02 PM    Specimen: Serum; Urine   Result Value Ref Range    Urine culture hold        Urine on hold in Microbiology dept for 2 days. If unpreserved urine is submitted, it cannot be used for addtional testing after 24 hours, recollection will be required.

## 2021-12-12 NOTE — ED NOTES
Patient and family updated on plan of care. Pending venous duplex lower extremities at this time. Paged by CT technician at [phone]. Patient to be admitted. Pending inpatient bed placement at this time. Family with patient. Diet Gingerale provided. [address] to await inpatient bed placement at this time. Patient denies chest pain or shortness of breath at this time. 1530  Inpatient bed assigned. Room being cleaned. Attempted to call report. 1534  Family updated on plan of care. Given room assignment at [facility] REPORT:    Verbal report given to Critical Care Transport RN (name) on Kasandra Case.  being transferred to [address] transport team (unit) for routine progression of care       Report consisted of patients Situation, Background, Assessment and   Recommendations(SBAR). Information from the following report(s) SBAR, Kardex, ED Summary, Recent Results and Cardiac Rhythm ventricular paced was reviewed with the receiving nurse. Lines:   Peripheral IV 12/12/21 Right Antecubital (Active)   Site Assessment Clean, dry, & intact 12/12/21 1111   Phlebitis Assessment 0 12/12/21 1111   Infiltration Assessment 0 12/12/21 1111   Dressing Status Clean, dry, & intact 12/12/21 1111   Hub Color/Line Status Pink 12/12/21 1111   Action Taken Blood drawn 12/12/21 1111   Alcohol Cap Used No 12/12/21 1111        Opportunity for questions and clarification was provided. Patient transported with:   Literably   EMS transport to [address] [place] [address] room 359    9334  Inpatient room ready. Attempted to call report at this time. Critical Care Transport ETA 1615.    1611  Attempted to call report at this time. Pending EMS     1628  To [facility] ED via [facility] team at this time. EMTALA completed.

## 2021-12-12 NOTE — ED PROVIDER NOTES
The history is provided by the patient. Dizziness  This is a new problem. The current episode started yesterday. The problem has been resolved. There was no focality noted. Pertinent negatives include no focal weakness, no loss of sensation, no loss of balance, no visual change, no mental status change and no unresponsiveness. Primary symptoms comment: was at [facility] yesterday and had near-syncope, nodding off at the table and recovering spontaneously. when he stood up he became lightheaded and felt very weak. . Associated symptoms include shortness of breath and vomiting. Pertinent negatives include no confusion. There were no medications administered prior to arrival.   Shortness of Breath  This is a new problem. The average episode lasts 1 week. The problem occurs continuously. The current episode started more than 2 days ago. The problem has not changed since onset. Associated symptoms include vomiting and leg swelling. Pertinent negatives include no fever, no coryza, no cough and no abdominal pain. It is unknown what precipitated the problem. Treatments tried: trelegy. The treatment provided no relief. Vomiting   This is a new problem. The current episode started yesterday. The problem occurs 2 to 4 times per day. The problem has been resolved. There has been no fever. Associated symptoms include diarrhea. Pertinent negatives include no fever, no abdominal pain and no cough. Diarrhea   This is a chronic problem. Episode onset: 1-2 months. The problem occurs constantly. The problem has not changed since onset. The pain is located in the generalized abdominal region. The pain is mild. Associated symptoms include diarrhea and vomiting. Pertinent negatives include no fever and no constipation. Nothing worsens the pain. The pain is relieved by nothing.         Past Medical History:   Diagnosis Date    Arrhythmia     AFib    Arthritis     [facility] fibrillation [facility]     Cancer ([facility] [facility]     right lung, skin cancer, bladder (instilled medication in bladder for tx)    Chronic kidney disease     Diabetes ([facility] [address]     NIDDM    Gout     Hypertension     Hypothyroidism     Ill-defined condition     neuropathy in feet    Other ill-defined conditions(799.89)     high cholesterol    Pacemaker     Psychiatric disorder     depression    Sleep apnea     uses CPAP       Past Surgical History:   Procedure Laterality Date    COLONOSCOPY N/A 8/8/2017    COLONOSCOPY performed by Josie Hylton MD at \[facility]\"" ENDOSCOPY    COLONOSCOPY N/A 2/22/2019    COLONOSCOPY performed by Evita Gustafson MD at \[facility]\"" ENDOSCOPY    ECHO 2D ADULT  9/2009    LVH, LAE, mild MR, EF 55-60%    HX CATARACT REMOVAL Bilateral     with lens implants    HX HEART CATHETERIZATION  2009    HX HEENT      tonsillectomy    HX KNEE ARTHROSCOPY Right     menisectomy    HX LUMBAR LAMINECTOMY      back surgery / hardware    HX OTHER SURGICAL  4/28/14    BRONCHOSCOPY/RIGHT VIDEO ASSISTED THORACOSCOPY,  2.RIGHT UPPER LOBE WEDGE RESECTION 3. COMPLETION RIGHT UPPER LOBECTOMY 4. MEDIASTINAL LYMPH NODE DISSECTION 5. INTERCOSTAL BLOCK    HX OTHER SURGICAL  4/28/14    Right Thoracoscopy, Ligation of inferior pulmonary ligament bleeding with prolene/clips    HX PACEMAKER Left 2011    AICD--Dr. Gladys Smith    HX UROLOGICAL      bladder tumor    [place] CARDIAC SURG PROCEDURE UNLIST  2010    ablation,  Pacer/defibralator    STRESS TEST CARDIOLITE  4/23/2010    exercise/lexiscan study - poor exercise capacity, normal perfusion, EF 62%    THORACOSCOPY SURG WEDG RESEC LUNG  2015    RUL         Family History:   Problem Relation Age of Onset    Cancer Mother         pancreatic    Cancer Father         colon    Cancer Sister         uterine    Cancer Brother         lung, bladder       Social History     Socioeconomic History    Marital status:      Spouse name: Not on file    Number of children: Not on file    Years of education: Not on file    Highest education level: Not on file   Occupational History    Not on file   Tobacco Use    Smoking status: Former Smoker     Packs/day: 2.50     Years: 25.00     Pack years: 62.50     Types: Cigarettes     Quit date: 1975     Years since quittin.11    Smokeless tobacco: Never Used    Tobacco comment: quit    Substance and Sexual Activity    Alcohol use: No     Alcohol/week: 0.0 standard drinks    Drug use: No    Sexual activity: Not on file   Other Topics Concern    Not on file   Social History Narrative    Not on file     Social Determinants of Health     Financial Resource Strain:     Difficulty of Paying Living Expenses: Not on file   Food Insecurity:     Worried About Running Out of Food in the Last Year: Not on file    Camilo of Food in the Last Year: Not on file   Transportation Needs:     Lack of Transportation (Medical): Not on file    Lack of Transportation (Non-Medical): Not on file   Physical Activity:     Days of Exercise per Week: Not on file    Minutes of Exercise per Session: Not on file   Stress:     Feeling of Stress : Not on file   Social Connections:     Frequency of Communication with Friends and Family: Not on file    Frequency of Social Gatherings with Friends and Family: Not on file    Attends Rastafari Services: Not on file    Active Member of [address] [place] [address] [employer] or Organizations: Not on file    Attends Club or Organization Meetings: Not on file    Marital Status: Not on file   Intimate Partner Violence:     Fear of Current or Ex-Partner: Not on file    Emotionally Abused: Not on file    Physically Abused: Not on file    Sexually Abused: Not on file   Housing Stability:     Unable to Pay for Housing in the Last Year: Not on file    Number of [place] in the Last Year: Not on file    Unstable Housing in the Last Year: Not on file         ALLERGIES: Patient has no known allergies. Review of Systems   Constitutional: Negative for fever.    Respiratory: Positive for shortness of breath. Negative for cough. Cardiovascular: Positive for leg swelling. Gastrointestinal: Positive for diarrhea and vomiting. Negative for abdominal pain and constipation. Neurological: Positive for dizziness. Negative for focal weakness and loss of balance. Psychiatric/Behavioral: Negative for confusion. All other systems reviewed and are negative. Vitals:    12/12/21 1048   BP: 136/88   Pulse: 83   Resp: 22   Temp: 98.5 °F (36.9 °C)   SpO2: 96%   Weight: 97.5 kg (215 lb)   Height: 5' 7\" (1.702 m)            Physical Exam  Vitals and nursing note reviewed. Constitutional:       General: He is not in acute distress. Appearance: He is well-developed. HENT:      Head: Normocephalic and atraumatic. Eyes:      Conjunctiva/sclera: Conjunctivae normal.   Neck:      Trachea: No tracheal deviation. Cardiovascular:      Rate and Rhythm: Normal rate and regular rhythm. Heart sounds: Normal heart sounds. Pulmonary:      Effort: Pulmonary effort is normal. No respiratory distress. Breath sounds: Examination of the right-lower field reveals rales. Examination of the left-lower field reveals rales. Wheezing (fine expiratory) and rales present. Abdominal:      General: There is no distension. Musculoskeletal:         General: No deformity. Normal range of motion. Cervical back: Neck supple. Skin:     General: Skin is warm and dry. Neurological:      Mental Status: He is alert. Cranial Nerves: No cranial nerve deficit. Psychiatric:         Behavior: Behavior normal.          [facility]  ED Course as of 12/12/21 1058   Sun Dec 12, 2021   1056 EKG 1054: Rate 86, biventricular pacing with native beats in a pattern of bigeminy. No ST segment or T wave abnormalities.  No significant change from last tracing.  [DK]      ED Course User Index  [DK] Little Pickering MD     24-year-old male with history of atrial fibrillation with pacemaker in place presents with feeling of lightheadedness, near syncope and weakness since yesterday. He is having some chronic ongoing vomiting and diarrhea. Notable unequal leg swelling and pulmonary vascular congestion with BNP elevation. D-dimer is elevated and he will need evaluation with VQ scan to rule out PE. Procedures    Perfect Serve Consult for Admission  12:20 PM    ED Room Number: SER08/08  Patient Name and age:  Nic Mccauley. 82 y.o.  male  Working Diagnosis:   1. Near syncope    2. SOB (shortness of breath)    3. CKD (chronic kidney disease) stage 4, GFR 15-29 ml/min ([facility])    4.  Acute diastolic CHF (congestive heart failure) ([facility] [address]        COVID-19 Suspicion:  no  Sepsis present:  no  Reassessment needed: no  Code Status:  Full Code  Readmission: no  Isolation Requirements:  no  Recommended Level of Care:  telemetry  Department:[place] ED - [phone]

## 2021-12-13 NOTE — PROGRESS NOTES
[facility] Adult  Hospitalist Group                                                                                          Hospitalist Progress Note  Elieser Aldana MD   covering  and   Answering service: [phone] OR [phone] from in house phone        Date of Service:  2021  NAME:  Gayathri Shaw :  1935  MRN:  [id number]      Admission Summary:   81 y/o male with verbal report by daughter describing hx of systolic CHF (low EF requiring defibrillator, cardiologist Fanny Meng from [facility]), hypothyroidism (on replacement tx), atrial fibrillation (on apixaban), renal insufficiency (from past and recent labs), diabetes, noninfectious diarrhea (worked up by outside [employer]), who presented in ER for near syncope/dizziness/sob/generalized weakness,etc. O2 sat reduced to low 90's, CXR initially reporting right pleural effusion and pulm htn? . Troponin negative x2, but elevated d-dimer and probnp. Pt is admitted for chf exacerbation with eval of other items of complaints. Interval history / Subjective:      Daughter at bedside, currently no sob  Assessment & Plan:     81 y/o male with verbal report by daughter describing hx of systolic CHF (low EF requiring defibrillator, cardiologist  from [facility], hypothyroidism (on replacement tx), atrial fibrillation (on apixaban), renal insufficiency (from past and recent labs), diabetes, noninfectious diarrhea (worked up by outside [employer]), who presented in ER for near syncope/dizziness/sob/generalized weakness,etc. O2 sat reduced to low 90's, CXR initially reporting right pleural effusion and pulm htn? . Troponin negative x2, but elevated d-dimer and probnp.  Pt is admitted for concerns of chf exacerbation with eval of other items of complaints.     SOB, improved, O2 sats okay on room air  -pt describes orthopnea  -cxr report noted  -IV diuresis--->changed to oral   -guideline for supplemental O2 if needed  -breathing tx  -I/O recordings  -cardiology consult ordered     R/o PE, elevated D-dimer in setting of sob with O2 sat slightly reduced on admission  -dopplers negative for DVT  -low probability on V/Q scan     Hx of Lung cancer s/p resection hx  -refer to above regarding tx   -pt/daughter denies recurrence     Atrial fibrillation  -rate controlled  -on metoprolol  -on apixaban     Hypothyroidism  -tsh very minimally above normal range  -would keep the current levothyroxine home dose and recommend repeat as outpatient when pt is stable     Urinary hesitancy and poor urine output in setting of known renal insufficiency (chronic), has seen urologist  \"Dr. Burt\" in the past  -resume home flomax  -resume diuretics for now  -12/13 the patient is indicating he did not urinate today though he states he went to be bathroom today to do so---discussed with daughter/patient, ordered bladder scan    Hx of chronic poor po intake due to nausea/spitting up his liquid that he drinks according to the daughter which pt has been going to outside GI physician with ongoing workup which daughter wants here while inpt-->discussed with daughter/patient, ordered speech therapy consult for swallow eval.     Hx of noninfectious diarrhea  -resume prn colestipol    Hx of chronic thrombocytopenia  -plt count stable     Diabetes  -ssi with accuchecks and carb consistent diet     Dyslipidemia history  -presumed as pt on home low dose lipitor and zetia     Renal insufficiency  -known by daughter  -slightly improved srcr, trend     Anemia, chronic and in setting of chronic renal insufficiency    Mood disorder history?  -pt on lexapro at home     See other orders for plan     DVT prophylaxis: already on apixaban for reasons above  Code Status: full  Surrogate Decision Maker: daughter Bhupendra Flor [phone]         Review of Systems:   Positive for chronic urinary flow hx, poor po intake/nausea spitting up his liquids, prior noninfectious diarrhea.   No report of subjective fever/chills/headache/cp/vision change/etc. ROS negative other than noted. Vital Signs:    Last 24hrs VS reviewed since prior progress note. Most recent are:  Visit Vitals  /62 (BP 1 Location: Right upper arm, BP Patient Position: At rest)   Pulse 86   Temp 98.3 °F (36.8 °C)   Resp 20   Ht 5' 7\" (1.702 m)   Wt 97.1 kg (214 lb)   SpO2 93%   BMI 33.52 kg/m²       No intake or output data in the 24 hours ending 12/13/21 1730     Physical Examination:     I had a face to face encounter with this patient and independently examined them on 12/13/2021 as outlined below:          Constitutional:  No acute distress, cooperative, pleasant    ENT:  Oral mucosa moist, supple    Resp:  No wheezing/rhonchi/rales. No accessory muscle use   CV:  Regular rhythm, normal rate, no rubs    GI:  Soft, non distended, non tender. No high pitch bowel sounds     Musculoskeletal:  No edema, warm, 2+ pulses throughout    Neurologic:  Moves all extremities.  Awake, alert            Data Review:         Labs:     Recent Labs     12/13/21  0414 12/12/21  1106   WBC 5.3 6.2   HGB 8.2* 9.5*   HCT 25.8* 30.4*   * 119*     Recent Labs     12/13/21  0414 12/12/21  1106    140   K 3.7 3.9    102   CO2 24 30   BUN 47* 48*   CREA 3.39* 3.56*   * 142*   CA 8.6 8.7   MG 2.2 2.1   PHOS 4.0  --      Recent Labs     12/13/21  0414 12/12/21  1106   ALT  --  37   AP  --  81   TBILI  --  0.7   TP  --  6.8   ALB 2.7* 3.2*   GLOB  --  3.6     Lab Results   Component Value Date/Time    Glucose (POC) 131 (H) 12/13/2021 04:53 PM    Glucose (POC) 127 (H) 12/13/2021 11:23 AM    Glucose (POC) 119 (H) 12/13/2021 08:23 AM    Glucose (POC) 172 (H) 12/12/2021 08:46 PM    Glucose (POC) 111 (H) 05/06/2021 12:19 PM         Medications Reviewed:     ______________________________________________________________________  EXPECTED LENGTH OF STAY: - - -  ACTUAL LENGTH OF STAY:          1                 Ada Tinajero MD

## 2021-12-13 NOTE — CONSULTS
Cardiology Consult Note    CC: SOB/syncope  Reason for consult:  CHF  Requesting MD:  Dr. Martín Hoyos     Subjective:      Date of  Admission: 12/12/2021 10:33 AM     Admission type:Emergency    Leim Kawasaki. is a 82 y.o. male admitted for Near syncope [R55]  SOB (shortness of breath) [R06.02]  CHF exacerbation (HCC) [I50.9]  Borderline low O2 saturation [R79.81]  Dizziness [R42]  Syncope, near [R55]. Patient complains of increasing SOB/weakness over several days. He felt very weak in just little activity. While sitting down eating a meal, he passed out. It was very brief. Denies any CP or palpitations or ICD discharge. Denies any weight gain or DALE. His past cardiac data include non-ischemic cardiomyopathy with last EF 35%, s/p ICD five years ago( [employer] ), permanent Afib on AC, & CKD.       Patient Active Problem List    Diagnosis Date Noted    Near syncope 12/12/2021    Dizziness 12/12/2021    SOB (shortness of breath) 12/12/2021    CHF exacerbation ([address] 12/12/2021    Syncope, near 12/12/2021    Borderline low O2 saturation 12/12/2021    Thrombocytopenia ([address] 05/01/2014    HTN (hypertension) 04/29/2014    CKD (chronic kidney disease) stage 3, GFR 30-59 ml/min ([facility]) 04/29/2014    Hyperkalemia 04/29/2014    Respiratory failure, post-operative ([address] 04/29/2014    Anemia, blood loss 83/54/0749    Metabolic acidosis, normal anion gap (NAG) 04/29/2014    COPD (chronic obstructive pulmonary disease) ([address] 04/29/2014    SAMIR (obstructive sleep apnea) 04/29/2014    Adenocarcinoma of lung ([address] 04/29/2014    HEMALATHA (acute kidney injury) ([facility] 04/29/2014    Lung nodules 04/01/2014    Occlusion and stenosis of carotid artery without mention of cerebral infarction 03/22/2011    Cardiac pacemaker in situ 12/08/2010    Arrhythmia Atrial Flutter 11/02/2010    Bradycardia Sick Sinus Syndrome 11/02/2010    CAD (coronary artery disease), native coronary artery 11/02/2010    DM (Diabetes Mellitus-Adult Onset) 11/02/2010    First degree atrioventricular block 11/02/2010    Right bundle branch block and left anterior fascicular block 11/02/2010    Benign hypertensive heart disease without heart failure 11/02/2010    Paroxysmal atrial fibrillation ([facility] 11/02/2010    Sleep apnea 11/02/2010      Wilma Rutledge MD  Past Medical History:   Diagnosis Date    Arrhythmia     AFib    Arthritis     Atrial fibrillation ([facility]     Cancer ([facility]     right lung, skin cancer, bladder (instilled medication in bladder for tx)    Chronic kidney disease     Diabetes ([facility]     NIDDM    Gout     Hypertension     Hypothyroidism     Ill-defined condition     neuropathy in feet    Other ill-defined conditions(799.89)     high cholesterol    Pacemaker     Psychiatric disorder     depression    Sleep apnea     uses CPAP      Past Surgical History:   Procedure Laterality Date    COLONOSCOPY N/A 8/8/2017    COLONOSCOPY performed by Anselmo Walters MD at [facility] ENDOSCOPY    COLONOSCOPY N/A 2/22/2019    COLONOSCOPY performed by Karlos Thornton MD at [facility] ENDOSCOPY    ECHO 2D ADULT  9/2009    LVH, LAE, mild MR, EF 55-60%    HX CATARACT REMOVAL Bilateral     with lens implants    HX HEART CATHETERIZATION  2009    HX HEENT      tonsillectomy    HX KNEE ARTHROSCOPY Right     menisectomy    HX LUMBAR LAMINECTOMY      back surgery / hardware    HX OTHER SURGICAL  4/28/14    BRONCHOSCOPY/RIGHT VIDEO ASSISTED THORACOSCOPY,  2.RIGHT UPPER LOBE WEDGE RESECTION 3. COMPLETION RIGHT UPPER LOBECTOMY 4. MEDIASTINAL LYMPH NODE DISSECTION 5. INTERCOSTAL BLOCK    HX OTHER SURGICAL  4/28/14    Right Thoracoscopy, Ligation of inferior pulmonary ligament bleeding with prolene/clips    HX PACEMAKER Left 2011    AICD--Dr. Annabelle Mendez    HX UROLOGICAL      bladder tumor    NE CARDIAC SURG PROCEDURE UNLIST  2010    ablation,  Pacer/defibralator    STRESS TEST CARDIOLITE  4/23/2010    exercise/lexiscan study - poor exercise capacity, normal perfusion, EF 62%    THORACOSCOPY SURG WEDG RESEC LUNG  2015    RUL        SHX; denies any smoking or ETOH use    No Known Allergies   Family History   Problem Relation Age of Onset    Cancer Mother         pancreatic    Cancer Father         colon    Cancer Sister         uterine    Cancer Brother         lung, bladder      Current Facility-Administered Medications   Medication Dose Route Frequency    albuterol-ipratropium (DUO-NEB) 2.5 MG-0.5 MG/3 ML  3 mL Nebulization Q4HWA RT    lisinopriL (PRINIVIL, ZESTRIL) tablet 2.5 mg  2.5 mg Oral DAILY    metoprolol succinate (TOPROL-XL) XL tablet 25 mg  25 mg Oral DAILY    apixaban (ELIQUIS) tablet 2.5 mg  2.5 mg Oral Q12H    levothyroxine (SYNTHROID) tablet 150 mcg  150 mcg Oral ACB    furosemide (LASIX) injection 20 mg  20 mg IntraVENous DAILY    atorvastatin (LIPITOR) tablet 20 mg  20 mg Oral DAILY    ezetimibe (ZETIA) tablet 10 mg  10 mg Oral DAILY    escitalopram oxalate (LEXAPRO) tablet 20 mg  20 mg Oral DAILY    tamsulosin (FLOMAX) capsule 0.4 mg  0.4 mg Oral DAILY    colestipoL (COLESTID) tablet 2 g  2 g Oral BID PRN    insulin lispro (HUMALOG) injection   SubCUTAneous AC&HS    glucose chewable tablet 16 g  4 Tablet Oral PRN    dextrose (D50W) injection syrg 12.5-25 g  12.5-25 g IntraVENous PRN    glucagon (GLUCAGEN) injection 1 mg  1 mg IntraMUSCular PRN    ondansetron (ZOFRAN) injection 4 mg  4 mg IntraVENous Q6H PRN    melatonin tablet 6 mg  6 mg Oral QHS PRN    acetaminophen (TYLENOL) tablet 650 mg  650 mg Oral Q6H PRN        Prior to Admission Medications:  Prior to Admission medications    Medication Sig Start Date End Date Taking? Authorizing Provider   exenatide microspheres (Bydureon) 2 mg/0.65 mL pnij 2 mg by SubCUTAneous route every seven (7) days. Thursday   Yes Other, MD Manoj   apixaban (ELIQUIS) 2.5 mg tablet Take 2.5 mg by mouth two (2) times a day.    Yes Provider, Historical   Cholecalciferol, Vitamin D3, (VITAMIN D3) 2,000 unit cap capsule Take 5,000 Units by mouth daily. Yes Provider, Historical   tamsulosin (FLOMAX) 0.4 mg capsule Take 0.4 mg by mouth daily. 11/10/16  Yes Provider, Historical   metoprolol succinate (TOPROL-XL) 25 mg XL tablet Take 25 mg by mouth nightly. 11/18/15  Yes Provider, Historical   bumetanide (BUMEX) 0.5 mg tablet Take 0.5 mg by mouth daily. 5/11/15  Yes Provider, Historical   SYNTHROID 150 mcg tablet 150 mcg daily. 4/29/15  Yes Provider, Historical   escitalopram (LEXAPRO) 20 mg tablet Take 20 mg by mouth daily. Yes Provider, Historical   ezetimibe (ZETIA) 10 mg tablet take 1 tablet by mouth once daily 11  Yes Ulisses Matthews MD   sitagliptin (JANUVIA) 50 mg tablet Take 50 mg by mouth daily. Yes Provider, Historical   atorvastatin (LIPITOR) 20 mg tablet Take 20 mg by mouth daily. Yes Provider, Historical   acetaminophen (TYLENOL EXTRA STRENGTH) 500 mg tablet Take 500 mg by mouth every six (6) hours as needed for Pain. Patient not taking: Reported on 2021    Provider, Historical   colchicine 0.6 mg tablet Take 0.6 mg by mouth daily as needed. Patient not taking: Reported on 2021    Provider, Historical        Review of Symptoms:  Except as noted in HPI, patient denies recent fever or chills, nausea, vomiting, diarrhea, hemoptysis, hematemesis, dysuria, myalgias, focal neurologic symptoms, ecchymosis, angioedema, odynophagia, dysphagia, sore throat, earache,rash, melena, hematochezia, depression, GERD, cold intolerance, petechia, bleeding gums, or significant weight loss. A comprehensive review of systems was negative except for that written in the HPI.      Subjective:    24 hr VS reviewed, overall VSSAF  Temp (24hrs), Av.2 °F (36.8 °C), Min:97.8 °F (36.6 °C), Max:98.5 °F (36.9 °C)    Patient Vitals for the past 8 hrs:   Pulse   21 0600 82   21 0359 85   21 0347 82   21 0100 81   21 2353 80    Patient Vitals for the past 8 hrs:   Resp 12/13/21 0347 18   12/12/21 2353 22    Patient Vitals for the past 8 hrs:   BP   12/13/21 0347 (!) 102/59   12/12/21 2353 118/61          Intake/Output Summary (Last 24 hours) at 12/13/2021 0700  Last data filed at 12/12/2021 1707  Gross per 24 hour   Intake 240 ml   Output 200 ml   Net 40 ml         Physical Exam (complete single organ system exam)        Visit Vitals  BP (!) 102/59 (BP 1 Location: Left upper arm, BP Patient Position: At rest) Comment: third attempt   Pulse 82   Temp 98.1 °F (36.7 °C)   Resp 18   Ht 5' 7\" (1.702 m)   Wt 214 lb 1.1 oz (97.1 kg)   SpO2 93%   BMI 33.53 kg/m²     General Appearance:  Well developed, well nourished,alert and oriented x 3, and individual in no acute distress. Ears/Nose/Mouth/Throat:   Hearing grossly normal.         Neck: Supple. Chest:   Lungs Rt basal dullness and rales   Cardiovascular:  irregular rate and rhythm, S1, S2 normal, no murmur. Abdomen:   Soft, non-tender, bowel sounds are active. Extremities: No edema bilaterally. Skin: Warm and dry. Cardiographics    Telemetry: AFIB, paced beats  ECG:  atrial fibrillation, rate 70s, paced beats  Echocardiogram: Not done    Labs:   Recent Results (from the past 24 hour(s))   EKG, 12 LEAD, INITIAL    Collection Time: 12/12/21 10:54 AM   Result Value Ref Range    Ventricular Rate 86 BPM    Atrial Rate 75 BPM    QRS Duration 158 ms    Q-T Interval 454 ms    QTC Calculation (Bezet) 543 ms    Calculated R Axis -90 degrees    Calculated T Axis 75 degrees    Diagnosis       Ventricular-paced rhythm  Biventricular pacemaker detected  Abnormal ECG  When compared with ECG of 25-OCT-2021 19:16,  Vent.  rate has decreased BY   5 BPM  Confirmed by Juli Pyle MD. ([id number]) on 12/12/2021 10:40:04 PM     SAMPLES BEING HELD    Collection Time: 12/12/21 11:06 AM   Result Value Ref Range    SAMPLES BEING HELD 1PINK, 1RED     COMMENT        Add-on orders for these samples will be processed based on acceptable specimen integrity and analyte stability, which may vary by analyte. NT-PRO BNP    Collection Time: 12/12/21 11:06 AM   Result Value Ref Range    NT pro-BNP 10,901 (H) 0 - 450 PG/ML   MAGNESIUM    Collection Time: 12/12/21 11:06 AM   Result Value Ref Range    Magnesium 2.1 1.6 - 2.4 mg/dL   METABOLIC PANEL, COMPREHENSIVE    Collection Time: 12/12/21 11:06 AM   Result Value Ref Range    Sodium 140 136 - 145 mmol/L    Potassium 3.9 3.5 - 5.1 mmol/L    Chloride 102 97 - 108 mmol/L    CO2 30 21 - 32 mmol/L    Anion gap 8 5 - 15 mmol/L    Glucose 142 (H) 65 - 100 mg/dL    BUN 48 (H) 6 - 20 MG/DL    Creatinine 3.56 (H) 0.70 - 1.30 MG/DL    BUN/Creatinine ratio 13 12 - 20      GFR est AA 20 (L) >60 ml/min/1.73m2    GFR est non-AA 16 (L) >60 ml/min/1.73m2    Calcium 8.7 8.5 - 10.1 MG/DL    Bilirubin, total 0.7 0.2 - 1.0 MG/DL    ALT (SGPT) 37 12 - 78 U/L    AST (SGOT) 25 15 - 37 U/L    Alk. phosphatase 81 45 - 117 U/L    Protein, total 6.8 6.4 - 8.2 g/dL    Albumin 3.2 (L) 3.5 - 5.0 g/dL    Globulin 3.6 2.0 - 4.0 g/dL    A-G Ratio 0.9 (L) 1.1 - 2.2     CBC WITH AUTOMATED DIFF    Collection Time: 12/12/21 11:06 AM   Result Value Ref Range    WBC 6.2 4.1 - 11.1 K/uL    RBC 3.14 (L) 4.10 - 5.70 M/uL    HGB 9.5 (L) 12.1 - 17.0 g/dL    HCT 30.4 (L) 36.6 - 50.3 %    MCV 96.8 80.0 - 99.0 FL    MCH 30.3 26.0 - 34.0 PG    MCHC 31.3 30.0 - 36.5 g/dL    RDW 14.6 (H) 11.5 - 14.5 %    PLATELET 369 (L) 905 - 400 K/uL    MPV 9.4 8.9 - 12.9 FL    NRBC 0.0 0  WBC    ABSOLUTE NRBC 0.00 0.00 - 0.01 K/uL    NEUTROPHILS 70 32 - 75 %    LYMPHOCYTES 20 12 - 49 %    MONOCYTES 7 5 - 13 %    EOSINOPHILS 2 0 - 7 %    BASOPHILS 0 0 - 1 %    IMMATURE GRANULOCYTES 1 (H) 0.0 - 0.5 %    ABS. NEUTROPHILS 4.4 1.8 - 8.0 K/UL    ABS. LYMPHOCYTES 1.2 0.8 - 3.5 K/UL    ABS. MONOCYTES 0.4 0.0 - 1.0 K/UL    ABS. EOSINOPHILS 0.1 0.0 - 0.4 K/UL    ABS. BASOPHILS 0.0 0.0 - 0.1 K/UL    ABS. IMM.  GRANS. 0.0 0.00 - 0.04 K/UL    DF AUTOMATED     D DIMER Collection Time: 12/12/21 11:06 AM   Result Value Ref Range    D-dimer 8.14 (H) 0.00 - 0.65 mg/L FEU   TROPONIN-HIGH SENSITIVITY    Collection Time: 12/12/21 11:06 AM   Result Value Ref Range    Troponin-High Sensitivity 32 0 - 76 ng/L   TSH 3RD GENERATION    Collection Time: 12/12/21 11:06 AM   Result Value Ref Range    TSH 4.47 (H) 0.36 - 3.74 uIU/mL   URINALYSIS W/MICROSCOPIC    Collection Time: 12/12/21 12:02 PM   Result Value Ref Range    Color YELLOW/STRAW      Appearance CLEAR CLEAR      Specific gravity 1.015 1.003 - 1.030      pH (UA) 5.5 5.0 - 8.0      Protein 100 (A) NEG mg/dL    Glucose Negative NEG mg/dL    Ketone Negative NEG mg/dL    Bilirubin Negative NEG      Blood SMALL (A) NEG      Urobilinogen 0.2 0.2 - 1.0 EU/dL    Nitrites Negative NEG      Leukocyte Esterase Negative NEG      WBC 0-4 0 - 4 /hpf    RBC 0-5 0 - 5 /hpf    Epithelial cells FEW FEW /lpf    Bacteria Negative NEG /hpf    Hyaline cast 2-5 0 - 5 /lpf   URINE CULTURE HOLD SAMPLE    Collection Time: 12/12/21 12:02 PM    Specimen: Serum; Urine   Result Value Ref Range    Urine culture hold        Urine on hold in Microbiology dept for 2 days. If unpreserved urine is submitted, it cannot be used for addtional testing after 24 hours, recollection will be required.    GLUCOSE, POC    Collection Time: 12/12/21  8:46 PM   Result Value Ref Range    Glucose (POC) 172 (H) 65 - 117 mg/dL    Performed by [facility] (PCT)    RENAL FUNCTION PANEL    Collection Time: 12/13/21  4:14 AM   Result Value Ref Range    Sodium 137 136 - 145 mmol/L    Potassium 3.7 3.5 - 5.1 mmol/L    Chloride 105 97 - 108 mmol/L    CO2 24 21 - 32 mmol/L    Anion gap 8 5 - 15 mmol/L    Glucose 109 (H) 65 - 100 mg/dL    BUN 47 (H) 6 - 20 MG/DL    Creatinine 3.39 (H) 0.70 - 1.30 MG/DL    BUN/Creatinine ratio 14 12 - 20      GFR est AA 21 (L) >60 ml/min/1.73m2    GFR est non-AA 17 (L) >60 ml/min/1.73m2    Calcium 8.6 8.5 - 10.1 MG/DL    Phosphorus 4.0 2.6 - 4.7 MG/DL Albumin 2.7 (L) 3.5 - 5.0 g/dL   CBC WITH AUTOMATED DIFF    Collection Time: 12/13/21  4:14 AM   Result Value Ref Range    WBC 5.3 4.1 - 11.1 K/uL    RBC 2.63 (L) 4.10 - 5.70 M/uL    HGB 8.2 (L) 12.1 - 17.0 g/dL    HCT 25.8 (L) 36.6 - 50.3 %    MCV 98.1 80.0 - 99.0 FL    MCH 31.2 26.0 - 34.0 PG    MCHC 31.8 30.0 - 36.5 g/dL    RDW 14.4 11.5 - 14.5 %    PLATELET 252 (L) 026 - 400 K/uL    MPV 9.5 8.9 - 12.9 FL    NRBC 0.0 0  WBC    ABSOLUTE NRBC 0.00 0.00 - 0.01 K/uL    NEUTROPHILS 68 32 - 75 %    LYMPHOCYTES 20 12 - 49 %    MONOCYTES 8 5 - 13 %    EOSINOPHILS 2 0 - 7 %    BASOPHILS 1 0 - 1 %    IMMATURE GRANULOCYTES 1 (H) 0.0 - 0.5 %    ABS. NEUTROPHILS 3.6 1.8 - 8.0 K/UL    ABS. LYMPHOCYTES 1.1 0.8 - 3.5 K/UL    ABS. MONOCYTES 0.4 0.0 - 1.0 K/UL    ABS. EOSINOPHILS 0.1 0.0 - 0.4 K/UL    ABS. BASOPHILS 0.0 0.0 - 0.1 K/UL    ABS. IMM. GRANS. 0.0 0.00 - 0.04 K/UL    DF AUTOMATED     MAGNESIUM    Collection Time: 12/13/21  4:14 AM   Result Value Ref Range    Magnesium 2.2 1.6 - 2.4 mg/dL        Assessment:     Assessment:   SOB; due to acute on chronic systolic HF and Rt pleural effusion  Syncope; it was more like near syncope; ?  ICD discharge  Rt pleural effusion; probably due to CHF  Nonischemic cardiomyopathy; no CAD from cath in 2009  CKD; advanced; this also contributing to SOB   Permanent Afib  S/p BiV ICD; working well       Plan:   Tele  Interrogate ICD  IV diuretic monitoring renal function  Therapeutic and diagnostic tap for pleural effusion  Continue other cardiac regimen but can not go on ACEI/ARB/ARNI/Spironolactone    Hakeem Deleon MD

## 2021-12-13 NOTE — PROGRESS NOTES
Transition of Care Plan   RUR: 20%    Disposition: The disposition plan is likely home to Independent living at [facility] with his wife    Daughter Jani Scott [phone]   F/U with PCP/Specialist     Transport: Daughter    Reason for Admission:   Near syncope                 RUR Score:     20%      PCP: First and Last name:  Yifan Tse MD     Name of Practice:   Are you a current patient: Yes/No:   Approximate date of last visit:    Can you do a virtual visit with your PCP:              Resources/supports as identified by patient/family:   No issues                 Top Challenges facing patient (as identified by patient/family and CM): None                     Finances/Medication cost?      Has health insurance               Transportation? Daughter                Support system or lack thereof? Independent living, spouse, and daughter                      Living arrangements? Lives in independent living with his spouse            Self-care/ADLs/Cognition? Independent            Current Advanced Directive/Advance Care Plan:  Prior      Healthcare Decision Maker:         Payor Source Payor: [place] MED[place]ARE / Plan: [place] MEDICARE PART A & B / Product Type: Medicare /                             Plan for utilizing home health:    No recommendations at this time                  Transition of Care Plan:                  CRM spoke with patient's daughter, introduced self, explained role, verified demographics, and offered assistance. The patient lives at [address] [place] [address] living with his spouse. The patient is independent in his ADL's/IADL's and does have a rollator and CPAP machine. The patient uses [facility] for his prescriptions. Care Management Interventions  PCP Verified by CM: Yes  Palliative Care Criteria Met (RRAT>21 & CHF Dx)?: No  Mode of Transport at Discharge:  Other (see comment) (daughter)  Transition of Care Consult (CM Consult): Discharge Planning  MyChart Signup: No  Discharge Durable Medical Equipment: No  Health Maintenance Reviewed: Yes  Physical Therapy Consult: No  Occupational Therapy Consult: No  Speech Therapy Consult: No  Support Systems: Child(beverly), Spouse/Significant Other  Confirm Follow Up Transport: Family  [employer] Information Provided?: No  Discharge Location  Discharge Placement: Home with family assistance    [place]

## 2021-12-13 NOTE — NURSE NAVIGATOR
Chart reviewed by Heart Failure Nurse Navigator. Heart Failure database completed. EF:  Prior 35%; repeat echo pending     ACEi/ARB/ARNi: lisinopril 2.5 mg daily    BB: toprol xl 25 mg daily    Aldosterone Antagonist: **    Obstructive Sleep Apnea Screening: has dx sleep apnea, uses cpap   STOP-BANG score:   Referred to Sleep Medicine:     CRT: ICD implanted. NYHA Functional Class documentation requested. Heart Failure Teach Back in Patient Education. Heart Failure Avoiding Triggers on Discharge Instructions. Cardiologist: Dr. Edel Chow ([facility])    Post discharge follow up phone call to be made within 48-72 hours of discharge.

## 2021-12-13 NOTE — PROGRESS NOTES
Physical Therapy Screening:    [facility] was triggered for physical therapy based on results obtained during the nursing admission assessment. The patients chart was reviewed and the patient is appropriate for a skilled therapy evaluation if there is a decline in functional mobility from baseline. Please order a consult for physical therapy if you are in agreement and would like an evaluation to be completed. Thank you.     Nicole Martinez, PT

## 2021-12-13 NOTE — CONSULTS
Pulmonary, Critical Care, and Sleep Medicine~Consult Note    Name: Andrew Bryan MRN: [id number]   : 1935 [facility]: [facility]    Date: 2021 2:46 PM Admission: 2021     Impression Plan   1. Shortness of breath. 2. Abnormal CXR with right side volume loss - pleural effusion, atelectatics or a combination of both. 3. Orthopnea; right diaphragmatic paresis? CHF? 4. Hx of NICMP, chronic systolic HF. 5. Elevated D-dimer; VQ low prob/ Duplex negative for DVT. 6. COPD.  7. 75 pack year smoking hx. 1. Us chest to assess pleural effusion. 2. If negative, will check snif test for diaphragmatic dysfunction. 3. Follow up on TTE. Thank you for the consult. Will follow. Pulmonary Consultation:    82year old male with past medical history significant as given below who presented to [facility] with increased shortness of breath. Patient was recently seen by Dr Dallas Bravo and started on Trelgy inhaler to which he felt some benefit. He denies cough, sputum, wheezing, fever, chills. Patient is not able to take a deep breath and gets short of breath on laying down. He has been having some abdominal issues with abdominal distension and nausea for the last 3 months and is scheduled for a endoscopy and colonoscopy in 2022. Former smoker 2-3 ppx 25 years, quit 40 years back. 2014 Right upper lobectomy for lung cancer. Follows with Dr Luisa Bailon. Last PET/Ct 2021 - normal.    Data reviewed:  Hgb 5.3  HGb 8.2  Plt 119  D-dimer 8.14 => vq scan low probability for pe. duples LE - negative for DVT. NT pro BNP 10,901  Cr 3.39  CXR right hemidiaphragm elevation.     A comprehensive review of systems was negative except for: Respiratory: positive for dyspnea on exertion     Past Medical History:   Diagnosis Date    Arrhythmia     AFib    Arthritis     Atrial fibrillation ([address]     Cancer ([facility] [address]     right lung, skin cancer, bladder (instilled medication in bladder for tx)    Chronic kidney disease     Diabetes ([facility] [address]     NIDDM    Gout     Hypertension     Hypothyroidism     Ill-defined condition     neuropathy in feet    Other ill-defined conditions(799.89)     high cholesterol    Pacemaker     Psychiatric disorder     depression    Sleep apnea     uses CPAP      Past Surgical History:   Procedure Laterality Date    COLONOSCOPY N/A 8/8/2017    COLONOSCOPY performed by Adrian Balderas MD at [facility] ENDOSCOPY    COLONOSCOPY N/A 2/22/2019    COLONOSCOPY performed by Kacey Mancera MD at [facility] ENDOSCOPY    ECHO 2D ADULT  9/2009    LVH, LAE, mild MR, EF 55-60%    HX CATARACT REMOVAL Bilateral     with lens implants    HX HEART CATHETERIZATION  2009    HX HEENT      tonsillectomy    HX KNEE ARTHROSCOPY Right     menisectomy    HX LUMBAR LAMINECTOMY      back surgery / hardware    HX OTHER SURGICAL  4/28/14    BRONCHOSCOPY/RIGHT VIDEO ASSISTED THORACOSCOPY,  2.RIGHT UPPER LOBE WEDGE RESECTION 3. COMPLETION RIGHT UPPER LOBECTOMY 4. MEDIASTINAL LYMPH NODE DISSECTION 5. INTERCOSTAL BLOCK    HX OTHER SURGICAL  4/28/14    Right Thoracoscopy, Ligation of inferior pulmonary ligament bleeding with prolene/clips    HX PACEMAKER Left 2011    AICD--Dr. Mercedes Go    HX UROLOGICAL      bladder tumor    [place] CARDIAC SURG PROCEDURE UNLIST  2010    ablation,  Pacer/defibralator    STRESS TEST CARDIOLITE  4/23/2010    exercise/lexiscan study - poor exercise capacity, normal perfusion, EF 62%    THORACOSCOPY SURG WEDG RESEC LUNG  2015    RUL      Prior to Admission medications    Medication Sig Start Date End Date Taking? Authorizing Provider   exenatide microspheres (Bydureon) 2 mg/0.65 mL pnij 2 mg by SubCUTAneous route every seven (7) days. Thursday   Yes Other, MD Manoj   apixaban (ELIQUIS) 2.5 mg tablet Take 2.5 mg by mouth two (2) times a day. Yes Provider, Historical   Cholecalciferol, Vitamin D3, (VITAMIN D3) 2,000 unit cap capsule Take 5,000 Units by mouth daily.    Yes Provider, Historical   tamsulosin (FLOMAX) 0.4 mg capsule Take 0.4 mg by mouth daily. 11/10/16  Yes Provider, Historical   metoprolol succinate (TOPROL-XL) 25 mg XL tablet Take 25 mg by mouth nightly. 11/18/15  Yes Provider, Historical   bumetanide (BUMEX) 0.5 mg tablet Take 0.5 mg by mouth daily. 5/11/15  Yes Provider, Historical   SYNTHROID 150 mcg tablet 150 mcg daily. 4/29/15  Yes Provider, Historical   escitalopram (LEXAPRO) 20 mg tablet Take 20 mg by mouth daily. Yes Provider, Historical   ezetimibe (ZETIA) 10 mg tablet take 1 tablet by mouth once daily 11  Yes Radha Lyon MD   sitagliptin (JANUVIA) 50 mg tablet Take 50 mg by mouth daily. Yes Provider, Historical   atorvastatin (LIPITOR) 20 mg tablet Take 20 mg by mouth daily. Yes Provider, Historical   acetaminophen (TYLENOL EXTRA STRENGTH) 500 mg tablet Take 500 mg by mouth every six (6) hours as needed for Pain. Patient not taking: Reported on 2021    Provider, Historical   colchicine 0.6 mg tablet Take 0.6 mg by mouth daily as needed.   Patient not taking: Reported on 2021    Provider, Historical     No Known Allergies   Social History     Tobacco Use    Smoking status: Former Smoker     Packs/day: 2.50     Years: 25.00     Pack years: 62.50     Types: Cigarettes     Quit date: 1975     Years since quittin.5    Smokeless tobacco: Never Used    Tobacco comment: quit    Substance Use Topics    Alcohol use: No     Alcohol/week: 0.0 standard drinks      Family History   Problem Relation Age of Onset    Cancer Mother         pancreatic    Cancer Father         colon    Cancer Sister         uterine    Cancer Brother         lung, bladder     OBJECTIVE:     Vital Signs:       Visit Vitals  BP (!) 114/56 (BP 1 Location: Right upper arm, BP Patient Position: At rest)   Pulse 80   Temp 98.5 °F (36.9 °C)   Resp 18   Ht 5' 7\" (1.702 m)   Wt 97.1 kg (214 lb)   SpO2 93%   BMI 33.52 kg/m²      Temp (24hrs), Av.1 °F (36.7 °C), Min:97.8 °F (36.6 °C), Max:98.5 °F (36.9 °C)     Intake/Output:     Last shift: No intake/output data recorded.     Last 3 shifts: 12/11 1901 - 12/13 0700  In: 240 [P.O.:240]  Out: 200 [Urine:200]          Intake/Output Summary (Last 24 hours) at 12/13/2021 1446  Last data filed at 12/12/2021 1707  Gross per 24 hour   Intake 240 ml   Output    Net 240 ml       Physical Exam:                                        Exam Findings Other   General: No resp distress noted, appears stated age    [de-identified]:  No ulcers, JVD not elevated, no cervical LAD    Chest: No pectus deformity, normal chest rise b/l    HEART:  RRR, no murmurs/rubs/gallops    Lungs:  CTA b/l, no rhonchi/crackles/wheeze, diminished BS at bases    ABD: Soft/NT, non rigid mildly distended    EXT: No cyanosis/clubbing/edema, normal peripheral pulses    Skin: No rashes or ulcers, no mottling    Neuro: A/O x 3        Medications:  Current Facility-Administered Medications   Medication Dose Route Frequency    perflutren lipid microspheres (DEFINITY) in NS bolus IV  1.5 mL IntraVENous RAD ONCE    [START ON 12/14/2021] bumetanide (BUMEX) tablet 1 mg  1 mg Oral DAILY    albuterol-ipratropium (DUO-NEB) 2.5 MG-0.5 MG/3 ML  3 mL Nebulization Q4HWA RT    metoprolol succinate (TOPROL-XL) XL tablet 25 mg  25 mg Oral DAILY    apixaban (ELIQUIS) tablet 2.5 mg  2.5 mg Oral Q12H    levothyroxine (SYNTHROID) tablet 150 mcg  150 mcg Oral ACB    atorvastatin (LIPITOR) tablet 20 mg  20 mg Oral DAILY    ezetimibe (ZETIA) tablet 10 mg  10 mg Oral DAILY    escitalopram oxalate (LEXAPRO) tablet 20 mg  20 mg Oral DAILY    tamsulosin (FLOMAX) capsule 0.4 mg  0.4 mg Oral DAILY    colestipoL (COLESTID) tablet 2 g  2 g Oral BID PRN    insulin lispro (HUMALOG) injection   SubCUTAneous AC&HS    glucose chewable tablet 16 g  4 Tablet Oral PRN    dextrose (D50W) injection syrg 12.5-25 g  12.5-25 g IntraVENous PRN    glucagon (GLUCAGEN) injection 1 mg  1 mg IntraMUSCular PRN    ondansetron (ZOFRAN) injection 4 mg  4 mg IntraVENous Q6H PRN    melatonin tablet 6 mg  6 mg Oral QHS PRN    acetaminophen (TYLENOL) tablet 650 mg  650 mg Oral Q6H PRN       Labs:  ABG No results for input(s): PHI, PCO2I, PO2I, HCO3I, SO2I, FIO2I in the last 72 hours.      CBC Recent Labs     12/13/21  0414 12/12/21  1106   WBC 5.3 6.2   HGB 8.2* 9.5*   HCT 25.8* 30.4*   * 119*   MCV 98.1 96.8   MCH 31.2 10.6        Metabolic  Panel Recent Labs     12/13/21  0414 12/12/21  1106    140   K 3.7 3.9    102   CO2 24 30   * 142*   BUN 47* 48*   CREA 3.39* 3.56*   CA 8.6 8.7   MG 2.2 2.1   PHOS 4.0  --    ALB 2.7* 3.2*   ALT  --  37        Pertinent Labs                Hobert Goltz, MD  12/13/2021

## 2021-12-13 NOTE — PROGRESS NOTES
Problem: Mobility Impaired (Adult and Pediatric)  Goal: *Acute Goals and Plan of Care (Insert Text)  Description: FUNCTIONAL STATUS PRIOR TO ADMISSION: Patient was modified independent using a rollator for functional mobility. After fall from bed 5 weeks previous, pt has dramatically decreased activity and mainly transfers bed<>chair will little mobility. HOME SUPPORT PRIOR TO ADMISSION: Lives in a [facility] independent[facility]assisted living facility. Pt classified as independent. Lives with wife whom is getting assistance for ADLs. Pt has daughter locally who is involved in care    Physical Therapy Goals  Initiated 12/13/2021    1. Patient will transfer from bed to chair and chair to bed with modified independence using the least restrictive device within 7 day(s). 2.  Patient will perform sit to stand with modified independence within 7 day(s). 3.  Patient will ambulate with modified independence for 200 feet with the least restrictive device within 7 day(s). Outcome: Not Met     PHYSICAL THERAPY EVALUATION  Patient: Juan Ramon Slaughter (82 y.o. male)  Date: 12/13/2021  Primary Diagnosis: Near syncope [R55]  SOB (shortness of breath) [R06.02]  CHF exacerbation (HCC) [I50.9]  Borderline low O2 saturation [R79.81]  Dizziness [R42]  Syncope, near [R55]        Precautions:   Fall      ASSESSMENT  Based on the objective data described below, the patient presents with decreased activity tolerance, decreased balance, decreased functional mobility and decreased ambulation distance. Pt was I with all ADLs and active up until a fall from bed 5 weeks prior. After fall, pt states he has mainly transferred bed to chair and back and mobilizes to bathroom however has mainly been taking meals in his room and overall dramatically decreased activity. Hemodynamics stable throughout session and pt mobilized on RA. Pt displayed GUTIÉRREZ after ambulation however SpO2 98%.   Will continue to follow while acute to promote OOB activity and improve activity tolerance however given level of care at pt residence, do not hold d/c for PT reasons once medically stable. Strongly encourage pt participate in formal PT once dc'ed to promote activity tolerance, improve CV fitness, improve strength and decrease caregiver burden. Current Level of Function Impacting Discharge (mobility/balance): SPV for ambulation    Functional Outcome Measure: The patient scored Total: 85/100 on the Barthel Index outcome measure which is indicative of being independent in basic self-care. Other factors to consider for discharge: good support system, can have assitance with ADLs/self care at living facility     Patient will benefit from skilled therapy intervention to address the above noted impairments. PLAN :  Recommendations and Planned Interventions: bed mobility training, transfer training, gait training, therapeutic exercises, neuromuscular re-education, patient and family training/education and therapeutic activities      Frequency/Duration: Patient will be followed by physical therapy:  4 times a week to address goals. Recommendation for discharge: (in order for the patient to meet his/her long term goals)  PT at independent living facility    This discharge recommendation:  Has been made in collaboration with the attending provider and/or case management    IF patient discharges home will need the following DME: patient owns DME required for discharge         SUBJECTIVE:   Patient stated Deloris King been sitting too long.     OBJECTIVE DATA SUMMARY:   HISTORY:    Past Medical History:   Diagnosis Date    Arrhythmia     AFib    Arthritis     [facility] [address]     Cancer ([facility] [address]     right lung, skin cancer, bladder (instilled medication in bladder for tx)    Chronic kidney disease     Diabetes ([facility] [address]     NIDDM    Gout     Hypertension     Hypothyroidism     Ill-defined condition     neuropathy in feet    Other ill-defined conditions(799.89)     high cholesterol    Pacemaker     Psychiatric disorder     depression    Sleep apnea     uses CPAP     Past Surgical History:   Procedure Laterality Date    COLONOSCOPY N/A 8/8/2017    [facility]OSCOPY    COLONOSCOPY N/A 2/22/2019    COLONOSCOPY performed by Félix Moreno MD at [facility] ENDOSCOPY    ECHO 2D ADULT  9/2009    LVH, LAE, mild MR, EF 55-60%    HX CATARACT REMOVAL Bilateral     with lens implants    HX HEART CATHETERIZATION  2009    HX HEENT      tonsillectomy    HX KNEE ARTHROSCOPY Right     menisectomy    HX LUMBAR LAMINECTOMY      back surgery / hardware    HX OTHER SURGICAL  4/28/14    BRONCHOSCOPY/RIGHT VIDEO ASSISTED THORACOSCOPY,  2.RIGHT UPPER LOBE WEDGE RESECTION 3. COMPLETION RIGHT UPPER LOBECTOMY 4. MEDIASTINAL LYMPH NODE DISSECTION 5. INTERCOSTAL BLOCK    HX OTHER SURGICAL  4/28/14    Right Thoracoscopy, Ligation of inferior pulmonary ligament bleeding with prolene/clips    HX PACEMAKER Left 2011    AICD--Dr. Adriel Angel    HX UROLOGICAL      bladder tumor    [place] CARDIAC SURG PROCEDURE UNLIST  2010    ablation,  Pacer/defibralator    STRESS TEST CARDIOLITE  4/23/2010    exercise/lexiscan study - poor exercise capacity, normal perfusion, EF 62%    THORACOSCOPY SURG WEDG RESEC LUNG  2015    RUL       Personal factors and/or comorbidities impacting plan of care:    Home Situation  Home Environment: Independent living [facility])  # Steps to Enter: 0  One/Two Story Residence: Other (Comment) (elevator)  Living Alone: No  Support Systems: Child(beverly), Assisted Living  Patient Expects to be Discharged to[de-identified] Independent living facility  Current DME Used/Available at Home: Mary Bile, rollator    EXAMINATION/PRESENTATION/DECISION MAKING:   Critical Behavior:  Neurologic State: Alert  Orientation Level: Oriented X4  Cognition: Appropriate decision making     Hearing:   Auditory  Auditory Impairment: Hard of hearing, bilateral    Range Of Motion:  AROM: Within functional limits           PROM: Within functional limits           Strength:    Strength: Within functional limits        Functional Mobility:  Bed Mobility:  Rolling: Modified independent  Supine to Sit: Modified independent  Sit to Supine: Modified independent     Transfers:  Sit to Stand: Supervision  Stand to Sit: Supervision                       Balance:   Sitting: Intact; Without support  Standing: Intact; With support  Ambulation/Gait Training:  Distance (ft): 40 Feet (ft)  Assistive Device: Gait belt; Walker, rolling  Ambulation - Level of Assistance: Supervision        Gait Abnormalities: Decreased step clearance        Base of Support: Narrowed     Speed/Alicia: Slow; Pace decreased (<100 feet/min)  Step Length: Left shortened; Right shortened        Interventions: Safety awareness training; Verbal cues         Functional Measure:  Barthel Index:    Bathin  Bladder: 10  Bowels: 10  Groomin  Dressing: 10  Feeding: 10  Mobility: 10  Stairs: 5  Toilet Use: 10  Transfer (Bed to Chair and Back): 10  Total: 85/100       The Barthel ADL Index: Guidelines  1. The index should be used as a record of what a patient does, not as a record of what a patient could do. 2. The main aim is to establish degree of independence from any help, physical or verbal, however minor and for whatever reason. 3. The need for supervision renders the patient not independent. 4. A patient's performance should be established using the best available evidence. Asking the patient, friends/relatives and nurses are the usual sources, but direct observation and common sense are also important. However direct testing is not needed. 5. Usually the patient's performance over the preceding 24-48 hours is important, but occasionally longer periods will be relevant. 6. Middle categories imply that the patient supplies over 50 per cent of the effort.   7. Use of aids to be independent is allowed. Score Interpretation (from [facility] ([place], [place]) 83)    Independent   60-79 Minimally independent   40-59 Partially dependent   20-39 Very dependent   <20 Totally dependent     -Corey Wyman., Barthel, D.W. (1965). Functional evaluation: the Barthel Index. [address] ([address] (1997). The Barthel activities of daily living index: self-reporting versus actual performance in the old (> or = 75 years). Journal of [address] [place] [address] 45(7), 14 [facility], J.J.M.F, Maximo Whalen., Juma Jimenez. (1999). Measuring the change in disability after inpatient rehabilitation; comparison of the responsiveness of the Barthel Index and Functional [place] Measure. Journal of Neurology, Neurosurgery, and Psychiatry, 66(4), 836-722. FERNIE Orozco, EDIE Stone, & Karen Phelps M.A. (2004) Assessment of post-stroke quality of life in cost-effectiveness studies: The usefulness of the Barthel Index and the EuroQoL-5D.  Quality of Life Research, 15, 404-70        Physical Therapy Evaluation Charge Determination   History Examination Presentation Decision-Making   HIGH Complexity :3+ comorbidities / personal factors will impact the outcome/ POC  MEDIUM Complexity : 3 Standardized tests and measures addressing body structure, function, activity limitation and / or participation in recreation  LOW Complexity : Stable, uncomplicated  Other outcome measures Barthel  LOW       Based on the above components, the patient evaluation is determined to be of the following complexity level: LOW     Pain Rating:  Pt did not complain of pain during session    Activity Tolerance:   Fair, SpO2 stable on RA and observed SOB with activity    After treatment patient left in no apparent distress:   Supine in bed, Call bell within reach, Bed / chair alarm activated, Caregiver / family present and Side rails x 3    COMMUNICATION/EDUCATION:   The patients plan of care was discussed with: Registered nurse and Case management. Fall prevention education was provided and the patient/caregiver indicated understanding., Patient/family have participated as able in goal setting and plan of care. and Patient/family agree to work toward stated goals and plan of care.     Thank you for this referral.  Darylene Michael, PT   Time Calculation: 28 mins

## 2021-12-14 NOTE — PROGRESS NOTES
SLP Contact Note    Consult received and appreciated. Patient chart reviewed. Pt has a history of regurgitation of liquids and was seeing an outpatient GI doctor for work-up. Given higher likelihood of esophageal etiology for this, would consider an esophagram.  Then, if oropharyngeal deficits identified, SLP happy to assess.       Thank you,  STEVEN LewisEd, [id number] [facility]  Speech-Language Pathologist

## 2021-12-14 NOTE — PROGRESS NOTES
[facility]  Hospitalist Group                                                                                          Hospitalist Progress Note  Carol Cooper MD   covering  and   Answering service: [phone] OR [phone] from in house phone        Date of Service:  2021  NAME:  Kasandra Case. :  1935  MRN:  [id number]      Admission Summary:   81 y/o male with verbal report by daughter describing hx of systolic CHF (low EF requiring defibrillator, cardiologist Charity Perez from [address] [place] [address]), hypothyroidism (on replacement tx), atrial fibrillation (on apixaban), renal insufficiency (from past and recent labs), diabetes, noninfectious diarrhea (worked up by outside [employer]), who presented in ER for near syncope/dizziness/sob/generalized weakness,etc. O2 sat reduced to low 90's, CXR initially reporting right pleural effusion and pulm htn? . Troponin negative x2, but elevated d-dimer and probnp. Pt is admitted for chf exacerbation with eval of other items of complaints. Interval history / Subjective:      Seen today. The daughter at bedside no acute problems. Denies any nausea vomiting today. Patient scheduled for esophagram however they are reluctant to have it done because of chronic kidney disease. Because that we will have radiologist input . Assessment & Plan:     81 y/o male with verbal report by daughter describing hx of systolic CHF (low EF requiring defibrillator, cardiologist  from [address] [place] [address], hypothyroidism (on replacement tx), atrial fibrillation (on apixaban), renal insufficiency (from past and recent labs), diabetes, noninfectious diarrhea (worked up by outside [employer]), who presented in ER for near syncope/dizziness/sob/generalized weakness,etc. O2 sat reduced to low 90's, CXR initially reporting right pleural effusion and pulm htn? . Troponin negative x2, but elevated d-dimer and probnp.  Pt is admitted for concerns of chf exacerbation with eval of other items of complaints.     SOB, improved, O2 sats okay on room air  -pt describes orthopnea  -cxr report noted  -IV diuresis--->changed to oral   -guideline for supplemental O2 if needed  -breathing tx  -I/O recordings  -cardiology consult. Appreciate input    History of regurgitation of liquids  -GI consulted.   Recommending an esophagram for now and then if an oropharyngeal deficit is identified will see patient  -Esophagram ordered however patient and daughter are reluctant to do it because of her history of CKD  -Discuss with radiology today or tomorrow       R/o PE, elevated D-dimer in setting of sob with O2 sat slightly reduced on admission  -dopplers negative for DVT  -low probability on V/Q scan     Hx of Lung cancer s/p resection hx  -refer to above regarding tx   -pt/daughter denies recurrence     Atrial fibrillation  -rate controlled  -on metoprolol  -on apixaban     Hypothyroidism  -tsh very minimally above normal range  -would keep the current levothyroxine home dose and recommend repeat as outpatient when pt is stable     Urinary hesitancy and poor urine output in setting of known renal insufficiency (chronic), has seen urologist  \"Dr. Burt\" in the past  -resume home flomax  -resume diuretics for now  -12/13 the patient is indicating he did not urinate today though he states he went to be bathroom today to do so---discussed with daughter/patient, ordered bladder scan    Hx of chronic poor po intake due to nausea/spitting up his liquid that he drinks according to the daughter which pt has been going to outside GI physician with ongoing workup which daughter wants here while inpt-->discussed with daughter/patient, ordered speech therapy consult for swallow eval.     Hx of noninfectious diarrhea  -resume prn colestipol    Hx of chronic thrombocytopenia  -plt count stable     Diabetes  -ssi with accuchecks and carb consistent diet     Dyslipidemia history  -presumed as pt on home low dose lipitor and zetia     Renal insufficiency  -known by daughter  -slightly improved srcr, trend     Anemia, chronic and in setting of chronic renal insufficiency    Mood disorder history?  -pt on lexapro at home     See other orders for plan     DVT prophylaxis: already on apixaban for reasons above  Code Status: full  Surrogate Decision Maker: daughter Darwin Holbrook [phone]         Review of Systems:   Positive for chronic urinary flow hx, poor po intake/nausea spitting up his liquids, prior noninfectious diarrhea. No report of subjective fever/chills/headache/cp/vision change/etc. ROS negative other than noted. Vital Signs:    Last 24hrs VS reviewed since prior progress note. Most recent are:  Visit Vitals  /77 (BP 1 Location: Right upper arm, BP Patient Position: At rest)   Pulse 85   Temp 98.7 °F (37.1 °C)   Resp 18   Ht 5' 7\" (1.702 m)   Wt 97.3 kg (214 lb 8.1 oz)   SpO2 91%   BMI 33.60 kg/m²         Intake/Output Summary (Last 24 hours) at 12/14/2021 1646  Last data filed at 12/14/2021 0646  Gross per 24 hour   Intake    Output 400 ml   Net -400 ml        Physical Examination:     I had a face to face encounter with this patient and independently examined them on 12/14/2021 as outlined below:          Constitutional:  No acute distress, cooperative, pleasant    ENT:  Oral mucosa moist, supple    Resp:  No wheezing/rhonchi/rales. No accessory muscle use   CV:  Regular rhythm, normal rate, no rubs    GI:  Soft, non distended, non tender. No high pitch bowel sounds     Musculoskeletal:  No edema, warm, 2+ pulses throughout    Neurologic:  Moves all extremities.  Awake, alert            Data Review:         Labs:     Recent Labs     12/14/21  0402 12/13/21  0414   WBC 5.1 5.3   HGB 8.6* 8.2*   HCT 27.4* 25.8*   * 119*     Recent Labs     12/14/21  0402 12/13/21  0414 12/12/21  1106    137 140   K 3.7 3.7 3.9    105 102   CO2 26 24 30   BUN 48* 47* 48*   CREA 3.50* 3.39* 3.56*   * 109* 142*   CA 8.4* 8.6 8.7   MG  --  2.2 2.1   PHOS 4.2 4.0  --      Recent Labs     12/14/21  0402 12/13/21  0414 12/12/21  1106   ALT  --   --  37   AP  --   --  81   TBILI  --   --  0.7   TP  --   --  6.8   ALB 2.8* 2.7* 3.2*   GLOB  --   --  3.6     Lab Results   Component Value Date/Time    Glucose (POC) 116 12/14/2021 12:02 PM    Glucose (POC) 119 (H) 12/14/2021 08:08 AM    Glucose (POC) 138 (H) 12/13/2021 09:36 PM    Glucose (POC) 131 (H) 12/13/2021 04:53 PM    Glucose (POC) 127 (H) 12/13/2021 11:23 AM         Medications Reviewed:     ______________________________________________________________________  EXPECTED LENGTH OF STAY: - - -  ACTUAL LENGTH OF STAY:          2                 Alie James MD

## 2021-12-14 NOTE — NURSE NAVIGATOR
Heart Failure Nurse Navigator rounds completed. HF NN provided introduction to self and nurse navigator role. Daughter Felipa Simpson at bedside. Living With Heart Failure booklet given to patient, along with weight calendar, magnet, and HF Support Group flyer. Introduced patient to Preparing for Successful Discharge - Heart Failure check list and explained that knowledge of these topics will help facilitate successful self management upon discharge. Reviewed daily weights, low salt diet and signs and symptoms of heart failure. Felipa Simpson states she will get a scale. Patient lives in an assisted living. Advised patient that follow up appointment will be scheduled and placed on Discharge Instructions prior to discharge. Felipa Simpson states patient has an appointment in January with Dr. Mercedes Go. Will continue to follow until discharge.

## 2021-12-14 NOTE — PROGRESS NOTES
Pt declined to go for barium swallow. Pt states \"I am not supposed to have any contrast because of my kidneys. \" MD notified during rounds. No orders received.

## 2021-12-14 NOTE — PROGRESS NOTES
Cardiology Progress Note                                        Admit Date: 12/12/2021    Assessment/Plan:     CHF; acute on chronic diastolic HF precipitated by his permanent Afib and CKD;echo showed EF 60%; improving  Permanent Afib; rate is controlled and on chronic AC  CKD; Cr is stable at 3.5; will monitor  Debility; he is on PT/OT    Kasandra Case. is a 82 y.o. male with     PROBLEM LIST:  Patient Active Problem List    Diagnosis Date Noted    Near syncope 12/12/2021    Dizziness 12/12/2021    SOB (shortness of breath) 12/12/2021    CHF exacerbation ([facility] 12/12/2021    Syncope, near 12/12/2021    Borderline low O2 saturation 12/12/2021    Thrombocytopenia ([address] 05/01/2014    HTN (hypertension) 04/29/2014    CKD (chronic kidney disease) stage 3, GFR 30-59 ml/min ([facility]) 04/29/2014    Hyperkalemia 04/29/2014    Respiratory failure, post-operative ([facility] 04/29/2014    Anemia, blood loss 10/57/3752    Metabolic acidosis, normal anion gap (NAG) 04/29/2014    COPD (chronic obstructive pulmonary disease) ([facility] 04/29/2014    SAMIR (obstructive sleep apnea) 04/29/2014    Adenocarcinoma of lung ([address] 04/29/2014    HEMALATHA (acute kidney injury) ([facility] 04/29/2014    Lung nodules 04/01/2014    Occlusion and stenosis of carotid artery without mention of cerebral infarction 03/22/2011    Cardiac pacemaker in situ 12/08/2010    Arrhythmia Atrial Flutter 11/02/2010    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 reports none.     Visit Vitals  /76 (BP 1 Location: Right upper arm, BP Patient Position: At rest)   Pulse 85   Temp 98.3 °F (36.8 °C)   Resp 14   Ht 5' 7\" (1.702 m)   Wt 214 lb 8.1 oz (97.3 kg)   SpO2 92%   BMI 33.60 kg/m²       Intake/Output Summary (Last 24 hours) at 12/14/2021 1441  Last data filed at 12/14/2021 0646  Gross per 24 hour   Intake    Output 400 ml   Net -400 ml       Objective:      Physical Exam:  HEENT: Perrla, EOMI  Neck: No JVD,  No thyroidmegaly  Resp: few rales  CV: irregular s1s2 No murmur no s3  Abd:Soft, Nontender  Ext: No edema  Neuro: Alert and oriented; Nonfocal  Skin: Warm, Dry, Intact  Pulses: 2+ DP/PT/Rad      Telemetry: AFIB    Current Facility-Administered Medications   Medication Dose Route Frequency    bumetanide (BUMEX) tablet 1 mg  1 mg Oral DAILY    polyethylene glycol (MIRALAX) packet 17 g  17 g Oral BID PRN    bisacodyL (DULCOLAX) suppository 10 mg  10 mg Rectal DAILY PRN    albuterol-ipratropium (DUO-NEB) 2.5 MG-0.5 MG/3 ML  3 mL Nebulization Q4HWA RT    metoprolol succinate (TOPROL-XL) XL tablet 25 mg  25 mg Oral DAILY    apixaban (ELIQUIS) tablet 2.5 mg  2.5 mg Oral Q12H    levothyroxine (SYNTHROID) tablet 150 mcg  150 mcg Oral ACB    atorvastatin (LIPITOR) tablet 20 mg  20 mg Oral DAILY    ezetimibe (ZETIA) tablet 10 mg  10 mg Oral DAILY    escitalopram oxalate (LEXAPRO) tablet 20 mg  20 mg Oral DAILY    tamsulosin (FLOMAX) capsule 0.4 mg  0.4 mg Oral DAILY    colestipoL (COLESTID) tablet 2 g  2 g Oral BID PRN    insulin lispro (HUMALOG) injection   SubCUTAneous AC&HS    glucose chewable tablet 16 g  4 Tablet Oral PRN    dextrose (D50W) injection syrg 12.5-25 g  12.5-25 g IntraVENous PRN    glucagon (GLUCAGEN) injection 1 mg  1 mg IntraMUSCular PRN    ondansetron (ZOFRAN) injection 4 mg  4 mg IntraVENous Q6H PRN    melatonin tablet 6 mg  6 mg Oral QHS PRN    acetaminophen (TYLENOL) tablet 650 mg  650 mg Oral Q6H PRN         Data Review:   Labs:    Recent Results (from the past 24 hour(s))   GLUCOSE, POC Collection Time: 12/13/21  4:53 PM   Result Value Ref Range    Glucose (POC) 131 (H) 65 - 117 mg/dL    Performed by Yasmine Ernandez    GLUCOSE, POC    Collection Time: 12/13/21  9:36 PM   Result Value Ref Range    Glucose (POC) 138 (H) 65 - 117 mg/dL    Performed by Rasheed Carrera (PCT)    RENAL FUNCTION PANEL    Collection Time: 12/14/21  4:02 AM   Result Value Ref Range    Sodium 138 136 - 145 mmol/L    Potassium 3.7 3.5 - 5.1 mmol/L    Chloride 106 97 - 108 mmol/L    CO2 26 21 - 32 mmol/L    Anion gap 6 5 - 15 mmol/L    Glucose 111 (H) 65 - 100 mg/dL    BUN 48 (H) 6 - 20 MG/DL    Creatinine 3.50 (H) 0.70 - 1.30 MG/DL    BUN/Creatinine ratio 14 12 - 20      GFR est AA 20 (L) >60 ml/min/1.73m2    GFR est non-AA 17 (L) >60 ml/min/1.73m2    Calcium 8.4 (L) 8.5 - 10.1 MG/DL    Phosphorus 4.2 2.6 - 4.7 MG/DL    Albumin 2.8 (L) 3.5 - 5.0 g/dL   CBC WITH AUTOMATED DIFF    Collection Time: 12/14/21  4:02 AM   Result Value Ref Range    WBC 5.1 4.1 - 11.1 K/uL    RBC 2.78 (L) 4.10 - 5.70 M/uL    HGB 8.6 (L) 12.1 - 17.0 g/dL    HCT 27.4 (L) 36.6 - 50.3 %    MCV 98.6 80.0 - 99.0 FL    MCH 30.9 26.0 - 34.0 PG    MCHC 31.4 30.0 - 36.5 g/dL    RDW 14.3 11.5 - 14.5 %    PLATELET 613 (L) 390 - 400 K/uL    MPV 9.1 8.9 - 12.9 FL    NRBC 0.0 0  WBC    ABSOLUTE NRBC 0.00 0.00 - 0.01 K/uL    NEUTROPHILS 70 32 - 75 %    LYMPHOCYTES 17 12 - 49 %    MONOCYTES 8 5 - 13 %    EOSINOPHILS 3 0 - 7 %    BASOPHILS 1 0 - 1 %    IMMATURE GRANULOCYTES 1 (H) 0.0 - 0.5 %    ABS. NEUTROPHILS 3.6 1.8 - 8.0 K/UL    ABS. LYMPHOCYTES 0.9 0.8 - 3.5 K/UL    ABS. MONOCYTES 0.4 0.0 - 1.0 K/UL    ABS. EOSINOPHILS 0.2 0.0 - 0.4 K/UL    ABS. BASOPHILS 0.0 0.0 - 0.1 K/UL    ABS. IMM.  GRANS. 0.1 (H) 0.00 - 0.04 K/UL    DF AUTOMATED     GLUCOSE, POC    Collection Time: 12/14/21  8:08 AM   Result Value Ref Range    Glucose (POC) 119 (H) 65 - 117 mg/dL    Performed by Heather Marti, POC    Collection Time: 12/14/21 12:02 PM   Result Value Ref Range Glucose (POC) 116 65 - 117 mg/dL    Performed by Ashley Uriarte

## 2021-12-15 NOTE — PROGRESS NOTES
Cardiology Progress Note                                        Admit Date: 12/12/2021    Assessment/Plan:     CHF; acute on chronic diastolic HF; EF was 45% on echo but proBNP was over 10,000 on POA; now improved; he lost 6 pounds overnight; would continue Bumex 1 mg a day  GI sx on POA; all resolved as failure improved  CAD; asymptomatic  CKD; stable; actually slightly better than POA  S/p pacer; due to gen change scheduled for next month; he can be discharged and he already has an appt with Dr. Carisa Cormier on 1/5/22    Koko Morin. is a 82 y.o. male with     PROBLEM LIST:  Patient Active Problem List    Diagnosis Date Noted    Near syncope 12/12/2021    Dizziness 12/12/2021    SOB (shortness of breath) 12/12/2021    CHF exacerbation ([facility] 12/12/2021    Syncope, near 12/12/2021    Borderline low O2 saturation 12/12/2021    Thrombocytopenia ([facility] 05/01/2014    HTN (hypertension) 04/29/2014    CKD (chronic kidney disease) stage 3, GFR 30-59 ml/min ([facility] 04/29/2014    Hyperkalemia 04/29/2014    Respiratory failure, post-operative ([facility] 04/29/2014    Anemia, blood loss 00/74/4838    Metabolic acidosis, normal anion gap (NAG) 04/29/2014    COPD (chronic obstructive pulmonary disease) ([facility] 04/29/2014    SAMIR (obstructive sleep apnea) 04/29/2014    Adenocarcinoma of lung ([facility] 04/29/2014    HEMALATHA (acute kidney injury) ([facility] 04/29/2014    Lung nodules 04/01/2014    Occlusion and stenosis of carotid artery without mention of cerebral infarction 03/22/2011    Cardiac pacemaker in situ 12/08/2010    Arrhythmia Atrial Flutter 11/02/2010    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 11/02/2010    Sleep apnea 11/02/2010         Subjective:     Seb Crawley. reports none.     Visit Vitals  /71 (BP 1 Location: Right upper arm, BP Patient Position: At rest)   Pulse 86   Temp 97.8 °F (36.6 °C)   Resp 28   Ht 5' 7\" (1.702 m)   Wt 208 lb 15.9 oz (94.8 kg)   SpO2 100%   BMI 32.73 kg/m²     No intake or output data in the 24 hours ending 12/15/21 0758    Objective:      Physical Exam:  HEENT: DylanrWILLIAM [place]  Neck: No JVD,  No thyroidmegaly  Resp: no rales  CV: irregular s1s2 No murmur no s3  Abd:Soft, Nontender  Ext: No edema  Neuro: Alert and oriented; Nonfocal  Skin: Warm, Dry, Intact  Pulses: 2+ DP/PT/Rad      Telemetry: AFIB    Current Facility-Administered Medications   Medication Dose Route Frequency    bumetanide (BUMEX) tablet 1 mg  1 mg Oral DAILY    polyethylene glycol (MIRALAX) packet 17 g  17 g Oral BID PRN    bisacodyL (DULCOLAX) suppository 10 mg  10 mg Rectal DAILY PRN    albuterol-ipratropium (DUO-NEB) 2.5 MG-0.5 MG/3 ML  3 mL Nebulization Q4HWA RT    metoprolol succinate (TOPROL-XL) XL tablet 25 mg  25 mg Oral DAILY    apixaban (ELIQUIS) tablet 2.5 mg  2.5 mg Oral Q12H    levothyroxine (SYNTHROID) tablet 150 mcg  150 mcg Oral ACB    atorvastatin (LIPITOR) tablet 20 mg  20 mg Oral DAILY    ezetimibe (ZETIA) tablet 10 mg  10 mg Oral DAILY    escitalopram oxalate (LEXAPRO) tablet 20 mg  20 mg Oral DAILY    tamsulosin (FLOMAX) capsule 0.4 mg  0.4 mg Oral DAILY    colestipoL (COLESTID) tablet 2 g  2 g Oral BID PRN    insulin lispro (HUMALOG) injection   SubCUTAneous AC&HS    glucose chewable tablet 16 g  4 Tablet Oral PRN    dextrose (D50W) injection syrg 12.5-25 g  12.5-25 g IntraVENous PRN    glucagon (GLUCAGEN) injection 1 mg  1 mg IntraMUSCular PRN    ondansetron (ZOFRAN) injection 4 mg  4 mg IntraVENous Q6H PRN    melatonin tablet 6 mg  6 mg Oral QHS PRN    acetaminophen (TYLENOL) tablet 650 mg  650 mg Oral Q6H PRN Data Review:   Labs:    Recent Results (from the past 24 hour(s))   GLUCOSE, POC    Collection Time: 12/14/21  8:08 AM   Result Value Ref Range    Glucose (POC) 119 (H) 65 - 117 mg/dL    Performed by Martha. [address], POC    Collection Time: 12/14/21 12:02 PM   Result Value Ref Range    Glucose (POC) 116 65 - 117 mg/dL    Performed by Martha. [address], POC    Collection Time: 12/14/21  4:52 PM   Result Value Ref Range    Glucose (POC) 138 (H) 65 - 117 mg/dL    Performed by Martha. [address], POC    Collection Time: 12/14/21  9:45 PM   Result Value Ref Range    Glucose (POC) 134 (H) 65 - 117 mg/dL    Performed by Petrona Black   [facility]    CBC WITH AUTOMATED DIFF    Collection Time: 12/15/21  4:12 AM   Result Value Ref Range    WBC 5.5 4.1 - 11.1 K/uL    RBC 2.86 (L) 4.10 - 5.70 M/uL    HGB 8.7 (L) 12.1 - 17.0 g/dL    HCT 28.4 (L) 36.6 - 50.3 %    MCV 99.3 (H) 80.0 - 99.0 FL    MCH 30.4 26.0 - 34.0 PG    MCHC 30.6 30.0 - 36.5 g/dL    RDW 14.3 11.5 - 14.5 %    PLATELET 206 (L) 270 - 400 K/uL    MPV 9.0 8.9 - 12.9 FL    NRBC 0.0 0  WBC    ABSOLUTE NRBC 0.00 0.00 - 0.01 K/uL    NEUTROPHILS 71 32 - 75 %    LYMPHOCYTES 16 12 - 49 %    MONOCYTES 8 5 - 13 %    EOSINOPHILS 3 0 - 7 %    BASOPHILS 1 0 - 1 %    IMMATURE GRANULOCYTES 1 (H) 0.0 - 0.5 %    ABS. NEUTROPHILS 4.0 1.8 - 8.0 K/UL    ABS. LYMPHOCYTES 0.9 0.8 - 3.5 K/UL    ABS. MONOCYTES 0.5 0.0 - 1.0 K/UL    ABS. EOSINOPHILS 0.2 0.0 - 0.4 K/UL    ABS. BASOPHILS 0.0 0.0 - 0.1 K/UL    ABS. IMM.  GRANS. 0.0 0.00 - 0.04 K/UL    DF AUTOMATED     METABOLIC PANEL, BASIC    Collection Time: 12/15/21  4:12 AM   Result Value Ref Range    Sodium 138 136 - 145 mmol/L    Potassium 4.1 3.5 - 5.1 mmol/L    Chloride 105 97 - 108 mmol/L    CO2 25 21 - 32 mmol/L    Anion gap 8 5 - 15 mmol/L    Glucose 121 (H) 65 - 100 mg/dL    BUN 52 (H) 6 - 20 MG/DL    Creatinine 3.36 (H) 0.70 - 1.30 MG/DL    BUN/Creatinine ratio 15 12 - 20      GFR est AA 21 (L) >60 ml/min/1.73m2    GFR est non-AA 18 (L) >60 ml/min/1.73m2    Calcium 8.7 8.5 - 10.1 MG/DL

## 2021-12-15 NOTE — PROGRESS NOTES
[address] [facility] Adult  Hospitalist Group                                                                                          Hospitalist Progress Note  Magalys Galindo MD   covering  and   Answering service: [phone] OR [phone] from in house phone        Date of Service:  12/15/2021  NAME:  Rina Weinberg. :  1935  MRN:  [id number]      Admission Summary:   81 y/o male with verbal report by daughter describing hx of systolic CHF (low EF requiring defibrillator, cardiologist Luis Ovalle from [facility]), hypothyroidism (on replacement tx), atrial fibrillation (on apixaban), renal insufficiency (from past and recent labs), diabetes, noninfectious diarrhea (worked up by outside [employer]), who presented in ER for near syncope/dizziness/sob/generalized weakness,etc. O2 sat reduced to low 90's, CXR initially reporting right pleural effusion and pulm htn? . Troponin negative x2, but elevated d-dimer and probnp. Pt is admitted for chf exacerbation with eval of other items of complaints. Interval history / Subjective:      Patient seen. No complaints. Had SNIFF test and esophagram today. Denies dysphagia. Feels his breathing is better. Assessment & Plan:     81 y/o male with verbal report by daughter describing hx of systolic CHF (low EF requiring defibrillator, cardiologist  from [address] [facility], hypothyroidism (on replacement tx), atrial fibrillation (on apixaban), renal insufficiency (from past and recent labs), diabetes, noninfectious diarrhea (worked up by outside [employer]), who presented in ER for near syncope/dizziness/sob/generalized weakness,etc. O2 sat reduced to low 90's, CXR initially reporting right pleural effusion and pulm htn? . Troponin negative x2, but elevated d-dimer and probnp.  Pt is admitted for concerns of chf exacerbation with eval of other items of complaints.     SOB, improved, O2 sats okay on room air  -pt describes orthopnea  -cxr report noted  -IV diuresis--->changed to oral   -guideline for supplemental O2 if needed  -breathing tx  -I/O recordings  - SNIFF test : Right hemidiaphragmatic paresis  -cardiology consult. Appreciate input    History of regurgitation of liquids  -GI consulted. Recommending an esophagram for now and then if an oropharyngeal deficit is identified will see patient  -Esophagram 12/15  reviewed: Esophageal dysmotility with small hiatal hernia and probable gastroesophageal  reflux.  Overall Limited study  -Discuss with radiology and reassured that there is no renal complication with procedure , however today patient and daughter do not want the test. We will cancel        R/o PE, elevated D-dimer in setting of sob with O2 sat slightly reduced on admission  -dopplers negative for DVT  -low probability on V/Q scan     Hx of Lung cancer s/p resection hx  -refer to above regarding tx   -pt/daughter denies recurrence     Atrial fibrillation  -rate controlled  -on metoprolol  -on apixaban     Hypothyroidism  -tsh very minimally above normal range  -would keep the current levothyroxine home dose and recommend repeat as outpatient when pt is stable     Urinary hesitancy and poor urine output in setting of known renal insufficiency (chronic), has seen urologist  \"Dr. Burt\" in the past  -resume home flomax  -resume diuretics for now  -12/13 the patient is indicating he did not urinate today though he states he went to be bathroom today to do so---discussed with daughter/patient, ordered bladder scan  - 12/15: No problems urinating     Hx of chronic poor po intake due to nausea/spitting up his liquid that he drinks according to the daughter which pt has been going to outside GI physician with ongoing workup which daughter wants here while inpt-->discussed with daughter/patient, ordered speech therapy consult for swallow eval.     Hx of noninfectious diarrhea  -resume prn colestipol    Hx of chronic thrombocytopenia  -plt count stable     Diabetes  -ssi with accuchecks and carb consistent diet     Dyslipidemia history  -presumed as pt on home low dose lipitor and zetia     Renal insufficiency  -known by daughter  -slightly improved srcr, trend     Anemia, chronic and in setting of chronic renal insufficiency    Mood disorder history?  -pt on lexapro at home     See other orders for plan     DVT prophylaxis: already on apixaban for reasons above  Code Status: full  Surrogate Decision Maker: daughter Mike Pereira [phone]    Disposition: Possible discharge tomorrow          Review of Systems:   Positive for chronic urinary flow hx, poor po intake/nausea spitting up his liquids, prior noninfectious diarrhea. No report of subjective fever/chills/headache/cp/vision change/etc. ROS negative other than noted. Vital Signs:    Last 24hrs VS reviewed since prior progress note. Most recent are:  Visit Vitals  /69 (BP 1 Location: Left upper arm, BP Patient Position: Sitting)   Pulse 85   Temp 98.5 °F (36.9 °C)   Resp 14   Ht 5' 7\" (1.702 m)   Wt 94.8 kg (208 lb 15.9 oz)   SpO2 94%   BMI 32.73 kg/m²         Intake/Output Summary (Last 24 hours) at 12/15/2021 1648  Last data filed at 12/15/2021 0759  Gross per 24 hour   Intake 120 ml   Output    Net 120 ml        Physical Examination:     I had a face to face encounter with this patient and independently examined them on 12/15/2021 as outlined below:          Constitutional:  No acute distress, cooperative, pleasant    ENT:  Oral mucosa moist, supple    Resp:  No wheezing/rhonchi/rales. No accessory muscle use   CV:  Regular rhythm, normal rate, no rubs    GI:  Soft, non distended, non tender. No high pitch bowel sounds     Musculoskeletal:  No edema, warm, 2+ pulses throughout    Neurologic:  Moves all extremities.  Awake, alert            Data Review:         Labs:     Recent Labs     12/15/21  0412 12/14/21  0402   WBC 5.5 5.1   HGB 8.7* 8.6*   HCT 28.4* 27.4*   * 121* Recent Labs     12/15/21  0412 12/14/21  0402 12/13/21  0414    138 137   K 4.1 3.7 3.7    106 105   CO2 25 26 24   BUN 52* 48* 47*   CREA 3.36* 3.50* 3.39*   * 111* 109*   CA 8.7 8.4* 8.6   MG  --   --  2.2   PHOS  --  4.2 4.0     Recent Labs     12/14/21 0402 12/13/21 0414   ALB 2.8* 2.7*     Lab Results   Component Value Date/Time    Glucose (POC) 128 (H) 12/15/2021 04:28 PM    Glucose (POC) 168 (H) 12/15/2021 11:42 AM    Glucose (POC) 156 (H) 12/15/2021 08:06 AM    Glucose (POC) 134 (H) 12/14/2021 09:45 PM    Glucose (POC) 138 (H) 12/14/2021 04:52 PM         Medications Reviewed:     ______________________________________________________________________  EXPECTED LENGTH OF STAY: 3d 19h  ACTUAL LENGTH OF STAY:          3                 Su Shah MD

## 2021-12-15 NOTE — PROGRESS NOTES
Problem: Mobility Impaired (Adult and Pediatric)  Goal: *Acute Goals and Plan of Care (Insert Text)  Description: FUNCTIONAL STATUS PRIOR TO ADMISSION: Patient was modified independent using a rollator for functional mobility. After fall from bed 5 weeks previous, pt has dramatically decreased activity and mainly transfers bed<>chair will little mobility. HOME SUPPORT PRIOR TO ADMISSION: [facility]. Pt classified as independent. Lives with wife whom is getting assistance for ADLs. Pt has daughter locally who is involved in care    Physical Therapy Goals  Initiated 12/13/2021    1. Patient will transfer from bed to chair and chair to bed with modified independence using the least restrictive device within 7 day(s). 2.  Patient will perform sit to stand with modified independence within 7 day(s). 3.  Patient will ambulate with modified independence for 200 feet with the least restrictive device within 7 day(s). Outcome: Progressing Towards Goal   PHYSICAL THERAPY TREATMENT  Patient: Sarath Estrada (82 y.o. male)  Date: 12/15/2021  Diagnosis: Near syncope [R55]  SOB (shortness of breath) [R06.02]  CHF exacerbation (HCC) [I50.9]  Borderline low O2 saturation [R79.81]  Dizziness [R42]  Syncope, near [R55] <principal problem not specified>       Precautions: Fall  Chart, physical therapy assessment, plan of care and goals were reviewed. ASSESSMENT  Patient continues with skilled PT services and is progressing towards goals. He was able to walk somewhat longer distances today, but limited by GUTIÉRREZ (with expiratory wheezes noted, but SpO2 90% on room air with activity) and some mild lightheadedness. Note that he does have some decreased systolic BP with activity with systolic as low as 684H compared to 130s at rest.  Discussed activity progression and safety considerations with patient daughter and highly recommend he continue with HHPT upon discharge.   We will continue to progress his activity as he is able to tolerate. Current Level of Function Impacting Discharge (mobility/balance): supervision for safety in hospital environment    Other factors to consider for discharge: recommend HHPT         PLAN :  Patient continues to benefit from skilled intervention to address the above impairments. Continue treatment per established plan of care. to address goals. Recommendation for discharge: (in order for the patient to meet his/her long term goals)  Physical therapy at least 2 days/week in the home     This discharge recommendation:  Has been made in collaboration with the attending provider and/or case management    IF patient discharges home will need the following DME: patient owns DME required for discharge       SUBJECTIVE:   Patient stated I know I need to walk more.     OBJECTIVE DATA SUMMARY:   Critical Behavior:  Neurologic State: Alert  Orientation Level: Oriented X4  Cognition: Appropriate decision making,Appropriate for age attention/concentration,Appropriate safety awareness,Follows commands     Functional Mobility Training:  Bed Mobility:     Supine to Sit: Additional time;Supervision  Sit to Supine:  (up in chair after)           Transfers:  Sit to Stand: Supervision; Adaptive equipment; Additional time  Stand to Sit: Supervision; Adaptive equipment; Additional time        Bed to Chair: Supervision; Adaptive equipment; Additional time                    Balance:  Sitting: Intact; Without support  Standing: Intact; With support (with hands on RW)  Ambulation/Gait Training:  Distance (ft):  (80 twice, plus 40 with seated rests between)  Assistive Device: Gait belt;Walker, rolling  Ambulation - Level of Assistance: Supervision; Adaptive equipment; Additional time        Gait Abnormalities: Decreased step clearance        Base of Support: Center of gravity altered (needs reminders to avoid pushing walker too far forward)     Speed/Alicia: Pace decreased (<100 feet/min)  Step Length: Right shortened;Left shortened                    Stairs: Therapeutic Exercises:   PLB  Pain Rating:  History of chronic back pain    Activity Tolerance:   Fair, requires rest breaks, and observed SOB with activity    After treatment patient left in no apparent distress:   Sitting in chair, Heels elevated for pressure relief, Call bell within reach, and Bed / chair alarm activated    COMMUNICATION/COLLABORATION:   The patients plan of care was discussed with: Registered nurse and Case management.      Jimena Martin, PT   Time Calculation: 40 mins

## 2021-12-15 NOTE — PROGRESS NOTES
Problem: Falls - Risk of  Goal: *Absence of Falls  Description: Document Delia Guerrero Fall Risk and appropriate interventions in the flowsheet.   Outcome: Progressing Towards Goal  Note: Fall Risk Interventions:  Mobility Interventions: Communicate number of staff needed for ambulation/transfer,Patient to call before getting OOB,Bed/chair exit alarm,Assess mobility with egress test         Medication Interventions: Evaluate medications/consider consulting pharmacy,Patient to call before getting OOB,Teach patient to arise slowly,Bed/chair exit alarm    Elimination Interventions: Call light in reach,Patient to call for help with toileting needs,Bed/chair exit alarm    History of Falls Interventions: Bed/chair exit alarm,Evaluate medications/consider consulting pharmacy         Problem: Patient Education: Go to Patient Education Activity  Goal: Patient/Family Education  Outcome: Progressing Towards Goal

## 2021-12-15 NOTE — PROGRESS NOTES
Pulmonary, Critical Care, and Sleep Medicine~Progress Note    Name: Antoinette Dee. MRN: [id number]   : 1935 [facility]   Date: 12/15/2021 2:46 PM Admission: 2021     Impression Plan   1. Shortness of breath. Feeling much better   2. Abnormal CXR with right side volume loss - pleural effusion, atelectatics or a combination of both - US Chest - no effusion. 3. Orthopnea; right diaphragmatic paresis? CHF? 4. Hx of NICMP, chronic systolic HF. 5. Elevated D-dimer; VQ low prob/ Duplex negative for DVT. 6. SAMIR; on CPAP therapy   7. 75 pack year smoking hx. Seen by Dr Tatum White 1. Continue trelegy at discharge  2. SNIFF test today  3. Follow up with Dr Tatum White in 1 wk   4. Will need outpatient PFTs   5. CPAP qhs   6. We will be available again to see if needed        Pulmonary Consultation:    12/15  Ultrasound did not show enough effusion to tap  Negative several pounds and is breathing much better today    1513  82year old male with past medical history significant as given below who presented to [facility] with increased shortness of breath. Patient was recently seen by Dr Tatum White and started on Trelgy inhaler to which he felt some benefit. He denies cough, sputum, wheezing, fever, chills. Patient is not able to take a deep breath and gets short of breath on laying down. He has been having some abdominal issues with abdominal distension and nausea for the last 3 months and is scheduled for a endoscopy and colonoscopy in 2022. Former smoker 2-3 ppx 25 years, quit 40 years back. 2014 Right upper lobectomy for lung cancer. Follows with Dr Leonard Roche. Last PET/Ct 2021 - normal.    Data reviewed:  Hgb 5.3  HGb 8.2  Plt 119  D-dimer 8.14 => vq scan low probability for pe. carlton LE - negative for DVT. NT pro BNP 10,901  Cr 3.39  CXR right hemidiaphragm elevation.     A comprehensive review of systems was negative except for: Respiratory: positive for dyspnea on exertion     Past Medical History:   Diagnosis Date    Arrhythmia     AFib    Arthritis     [facility]     Cancer ([facility] [address]     right lung, skin cancer, bladder (instilled medication in bladder for tx)    Chronic kidney disease     Diabetes ([facility] [address]     NIDDM    Gout     Hypertension     Hypothyroidism     Ill-defined condition     neuropathy in feet    Other ill-defined conditions(799.89)     high cholesterol    Pacemaker     Psychiatric disorder     depression    Sleep apnea     uses CPAP      Past Surgical History:   Procedure Laterality Date    COLONOSCOPY N/A 8/8/2017    COLONOSCOPY performed by Steven Smith MD at [facility] ENDOSCOPY    COLONOSCOPY N/A 2/22/2019    COLONOSCOPY performed by Whitney Moran MD at [facility] ENDOSCOPY    ECHO 2D ADULT  9/2009    LVH, LAE, mild MR, EF 55-60%    HX CATARACT REMOVAL Bilateral     with lens implants    HX HEART CATHETERIZATION  2009    HX HEENT      tonsillectomy    HX KNEE ARTHROSCOPY Right     menisectomy    HX LUMBAR LAMINECTOMY      back surgery / hardware    HX OTHER SURGICAL  4/28/14    BRONCHOSCOPY/RIGHT VIDEO ASSISTED THORACOSCOPY,  2.RIGHT UPPER LOBE WEDGE RESECTION 3. COMPLETION RIGHT UPPER LOBECTOMY 4. MEDIASTINAL LYMPH NODE DISSECTION 5. INTERCOSTAL BLOCK    HX OTHER SURGICAL  4/28/14    Right Thoracoscopy, Ligation of inferior pulmonary ligament bleeding with prolene/clips    HX PACEMAKER Left 2011    AICD--Dr. Yasmin Louis    HX UROLOGICAL      bladder tumor    [facility] CARDIAC SURG PROCEDURE UNLIST  2010    ablation,  Pacer/defibralator    STRESS TEST CARDIOLITE  4/23/2010    exercise/lexiscan study - poor exercise capacity, normal perfusion, EF 62%    THORACOSCOPY SURG WEDG RESEC LUNG  2015    RUL      Prior to Admission medications    Medication Sig Start Date End Date Taking? Authorizing Provider   colestipoL (COLESTID) 1 gram tablet Take 1 g by mouth two (2) times a day.    Yes Provider, Historical   exenatide microspheres (Bydureon) 2 mg/0.65 mL pnij 2 mg by SubCUTAneous route every seven (7) days. Thursday   Yes Manoj Huddleston MD   apixaban (ELIQUIS) 2.5 mg tablet Take 2.5 mg by mouth two (2) times a day. Yes Provider, Historical   Cholecalciferol, Vitamin D3, (VITAMIN D3) 2,000 unit cap capsule Take 5,000 Units by mouth daily. Yes Provider, Historical   tamsulosin (FLOMAX) 0.4 mg capsule Take 0.4 mg by mouth daily. 11/10/16  Yes Provider, Historical   metoprolol succinate (TOPROL-XL) 25 mg XL tablet Take 25 mg by mouth nightly. 11/18/15  Yes Provider, Historical   bumetanide (BUMEX) 0.5 mg tablet Take 0.5 mg by mouth daily. 5/11/15  Yes Provider, Historical   SYNTHROID 150 mcg tablet 150 mcg daily. 4/29/15  Yes Provider, Historical   escitalopram (LEXAPRO) 20 mg tablet Take 20 mg by mouth daily. Yes Provider, Historical   ezetimibe (ZETIA) 10 mg tablet take 1 tablet by mouth once daily 11  Yes Nunu Tomilnson MD   sitagliptin (JANUVIA) 50 mg tablet Take 50 mg by mouth daily. Yes Provider, Historical   atorvastatin (LIPITOR) 20 mg tablet Take 20 mg by mouth daily. Yes Provider, Historical   acetaminophen (TYLENOL EXTRA STRENGTH) 500 mg tablet Take 500 mg by mouth every six (6) hours as needed for Pain. Patient not taking: Reported on 2021    Provider, Historical   colchicine 0.6 mg tablet Take 0.6 mg by mouth daily as needed.   Patient not taking: Reported on 2021    Provider, Historical     No Known Allergies   Social History     Tobacco Use    Smoking status: Former Smoker     Packs/day: 2.50     Years: 25.00     Pack years: 62.50     Types: Cigarettes     Quit date: 1975     Years since quittin.11    Smokeless tobacco: Never Used    Tobacco comment: quit    Substance Use Topics    Alcohol use: No     Alcohol/week: 0.0 standard drinks      Family History   Problem Relation Age of Onset    Cancer Mother         pancreatic    Cancer Father         colon    Cancer Sister uterine    Cancer Brother         lung, bladder     OBJECTIVE:     Vital Signs:       Visit Vitals  /70 (BP 1 Location: Right upper arm, BP Patient Position: At rest)   Pulse 86   Temp 97.6 °F (36.4 °C)   Resp 15   Ht 5' 7\" (1.702 m)   Wt 94.8 kg (208 lb 15.9 oz)   SpO2 93%   BMI 32.73 kg/m²      Temp (24hrs), Av.2 °F (36.8 °C), Min:97.6 °F (36.4 °C), Max:98.7 °F (37.1 °C)     Intake/Output:     Last shift: No intake/output data recorded.     Last 3 shifts:  1901 - 12/15 0700  In: -   Out: 400 [Urine:400]        No intake or output data in the 24 hours ending 12/15/21 0914    Physical Exam:                                        Exam Findings Other   General: No resp distress noted, appears stated age    [de-identified]:  No ulcers, JVD not elevated, no cervical LAD    Chest: No pectus deformity, normal chest rise b/l    HEART:  RRR, no murmurs/rubs/gallops    Lungs:  CTA b/l, no rhonchi/crackles/wheeze, diminished BS at bases    ABD: Soft/NT, non rigid mildly distended    EXT: No cyanosis/clubbing/edema, normal peripheral pulses    Skin: No rashes or ulcers, no mottling    Neuro: A/O x 3        Medications:  Current Facility-Administered Medications   Medication Dose Route Frequency    bumetanide (BUMEX) tablet 1 mg  1 mg Oral DAILY    polyethylene glycol (MIRALAX) packet 17 g  17 g Oral BID PRN    bisacodyL (DULCOLAX) suppository 10 mg  10 mg Rectal DAILY PRN    albuterol-ipratropium (DUO-NEB) 2.5 MG-0.5 MG/3 ML  3 mL Nebulization Q4HWA RT    metoprolol succinate (TOPROL-XL) XL tablet 25 mg  25 mg Oral DAILY    apixaban (ELIQUIS) tablet 2.5 mg  2.5 mg Oral Q12H    levothyroxine (SYNTHROID) tablet 150 mcg  150 mcg Oral ACB    atorvastatin (LIPITOR) tablet 20 mg  20 mg Oral DAILY    ezetimibe (ZETIA) tablet 10 mg  10 mg Oral DAILY    escitalopram oxalate (LEXAPRO) tablet 20 mg  20 mg Oral DAILY    tamsulosin (FLOMAX) capsule 0.4 mg  0.4 mg Oral DAILY    colestipoL (COLESTID) tablet 2 g  2 g Oral BID PRN    insulin lispro (HUMALOG) injection   SubCUTAneous AC&HS    glucose chewable tablet 16 g  4 Tablet Oral PRN    dextrose (D50W) injection syrg 12.5-25 g  12.5-25 g IntraVENous PRN    glucagon (GLUCAGEN) injection 1 mg  1 mg IntraMUSCular PRN    ondansetron (ZOFRAN) injection 4 mg  4 mg IntraVENous Q6H PRN    melatonin tablet 6 mg  6 mg Oral QHS PRN    acetaminophen (TYLENOL) tablet 650 mg  650 mg Oral Q6H PRN       Labs:  ABG No results for input(s): PHI, PCO2I, PO2I, HCO3I, SO2I, FIO2I in the last 72 hours. CBC Recent Labs     12/15/21  0412 12/14/21  0402 12/13/21 0414   WBC 5.5 5.1 5.3   HGB 8.7* 8.6* 8.2*   HCT 28.4* 27.4* 25.8*   * 121* 119*   MCV 99.3* 98.6 98.1   MCH 30.4 30.9 76.4        Metabolic  Panel Recent Labs     12/15/21  0412 12/14/21  0402 12/13/21  0414 12/12/21  1106 12/12/21  1106    138 137   < > 140   K 4.1 3.7 3.7   < > 3.9    106 105   < > 102   CO2 25 26 24   < > 30   * 111* 109*   < > 142*   BUN 52* 48* 47*   < > 48*   CREA 3.36* 3.50* 3.39*   < > 3.56*   CA 8.7 8.4* 8.6   < > 8.7   MG  --   --  2.2  --  2.1   PHOS  --  4.2 4.0  --   --    ALB  --  2.8* 2.7*  --  3.2*   ALT  --   --   --   --  37    < > = values in this interval not displayed.         Pertinent Labs                Lori Jiménez PA-C  12/15/2021

## 2021-12-15 NOTE — PROGRESS NOTES
Transition of Care  1. RUR 21% High  2. Disposition [facility] with home health PT-order placed  3. DME Rollator and cpap  4. Transportation Family   5. Follow Up PCP, Specialist    *Duane ruiz*    KAY contacted [facility] [place] [phone] and spoke with DON [place]. CM to fax updated medicals to [phone]. Vaughn rivera plans to meet face to face with patient tomorrow. Patient is planned for DC tomorrow. CM followed up with patient and daughter. CM to monitor. Medicare pt has received, reviewed, and signed 1st IM letter informing them of their right to appeal the discharge. Signed copy has been placed on pt bedside chart. Patient/family seen: YES       Informed patient/family of BPCI-A Bundle Program. Also advised of potential outreach by Care Transitions Team.    Bundle Payment Care Improvement Beneficiary Letter Delivered to Beneficiary or Representative:YES.  Date BCPI -A was given: 12/15/21        Bruce Soliz MS

## 2021-12-16 NOTE — PROGRESS NOTES
Snif test - right diaphragmatic paresis.  => Explains shortness of breath on laying down on right side.

## 2021-12-16 NOTE — PROGRESS NOTES
[address] [facility] Adult  Hospitalist Group                                                                                          Hospitalist Progress Note  Laure Atkins MD   covering  and   Answering service: [phone] OR [phone] from in house phone        Date of Service:  2021  NAME:  Seb Crawley. :  1935  MRN:  [id number]      Admission Summary:   81 y/o male with verbal report by daughter describing hx of systolic CHF (low EF requiring defibrillator, cardiologist Shasha Vidal from [facility]), hypothyroidism (on replacement tx), atrial fibrillation (on apixaban), renal insufficiency (from past and recent labs), diabetes, noninfectious diarrhea (worked up by outside [employer]), who presented in ER for near syncope/dizziness/sob/generalized weakness,etc. O2 sat reduced to low 90's, CXR initially reporting right pleural effusion and pulm htn? . Troponin negative x2, but elevated d-dimer and probnp. Pt is admitted for chf exacerbation with eval of other items of complaints. Interval history / Subjective:      I saw and examined patient earlier this morning. He reported improvement in his breathing. Patient was put on oxygen last night for a one-time SPO2 reading of 91%. Taken off oxygen, his SPO2 remained above 92%. Assessment & Plan:     Acute on chronic diastolic CHF.  --Diuresed well, now on oral bumetanide. --Continue Toprol. --Cardiology following  --Sniff test showed right diaphragmatic paresis which could impart explained the patient's symptoms. He will need outpatient follow-up.       History of regurgitation of liquids  -GI consulted.   Recommending an esophagram for now and then if an oropharyngeal deficit is identified will see patient  -Esophagram 12/15  reviewed: Esophageal dysmotility with small hiatal hernia and probable gastroesophageal  reflux.   -Speech evaluated and recommended regular diet, thin liquids, upright for all p.o. and 30 minutes after, small bites and sips, alternate liquids and solids. SAMIR. Uses nasal CPAP, has been a while since he had evaluation. Advised patient to have evaluation to make sure his CPAP setting remains adequate     R/o PE, elevated D-dimer in setting of sob with O2 sat slightly reduced on admission  -dopplers negative for DVT  -low probability on V/Q scan     Hx of Lung cancer s/p resection hx  -refer to above regarding tx   -pt/daughter denies recurrence     Atrial fibrillation  -rate controlled  -on metoprolol  -on apixaban     Hypothyroidism  -tsh very minimally above normal range  -would keep the current levothyroxine home dose and recommend repeat as outpatient when pt is stable     Urinary hesitancy and poor urine output in setting of known renal insufficiency (chronic), has seen urologist  \"Dr. Burt\" in the past  -resume home flomax  -resume diuretics for now  -12/13 the patient is indicating he did not urinate today though he states he went to be bathroom today to do so---discussed with daughter/patient, ordered bladder scan  - 12/15: No problems urinating        Hx of noninfectious diarrhea  -resume prn colestipol    Hx of chronic thrombocytopenia  -plt count stable     Diabetes  -ssi with accuchecks and carb consistent diet     Dyslipidemia history  -presumed as pt on home low dose lipitor and zetia     Renal insufficiency  -known by daughter  -slightly improved srcr, trend     Anemia, chronic and in setting of chronic renal insufficiency    Mood disorder history?  -pt on lexapro at home        DVT prophylaxis: already on apixaban for reasons above  Code Status: full  Surrogate Decision Maker: daughter Kashif Schwartz [phone]  Care plan discussed with daughter who was at the bedside. Anticipate discharge tomorrow. Review of Systems:   Positive for chronic urinary flow hx, poor po intake/nausea spitting up his liquids, prior noninfectious diarrhea.   No report of subjective fever/chills/headache/cp/vision change/etc. ROS negative other than noted. Vital Signs:    Last 24hrs VS reviewed since prior progress note. Most recent are:  Visit Vitals  /73 (BP 1 Location: Right upper arm, BP Patient Position: At rest)   Pulse 83   Temp 97.8 °F (36.6 °C)   Resp 18   Ht 5' 7\" (1.702 m)   Wt 95.1 kg (209 lb 10.5 oz)   SpO2 93%   BMI 32.84 kg/m²       No intake or output data in the 24 hours ending 12/16/21 1835     Physical Examination:     I had a face to face encounter with this patient and independently examined them on 12/16/2021 as outlined below:          Constitutional:  No acute distress, cooperative, pleasant    ENT:  Oral mucosa moist, supple    Resp:  No wheezing/rhonchi/rales. No accessory muscle use   CV:  Regular rhythm, normal rate, no rubs    GI:  Soft, non distended, non tender. No high pitch bowel sounds     Musculoskeletal:  No edema, warm, 2+ pulses throughout    Neurologic:  Moves all extremities.  Awake, alert            Data Review:         Labs:     Recent Labs     12/15/21  0412 12/14/21  0402   WBC 5.5 5.1   HGB 8.7* 8.6*   HCT 28.4* 27.4*   * 121*     Recent Labs     12/15/21  0412 12/14/21  0402    138   K 4.1 3.7    106   CO2 25 26   BUN 52* 48*   CREA 3.36* 3.50*   * 111*   CA 8.7 8.4*   PHOS  --  4.2     Recent Labs     12/14/21  0402   ALB 2.8*     Lab Results   Component Value Date/Time    Glucose (POC) 108 12/16/2021 04:51 PM    Glucose (POC) 121 (H) 12/16/2021 11:46 AM    Glucose (POC) 105 12/16/2021 08:16 AM    Glucose (POC) 133 (H) 12/15/2021 09:40 PM    Glucose (POC) 128 (H) 12/15/2021 04:28 PM         Medications Reviewed:     ______________________________________________________________________  EXPECTED LENGTH OF STAY: 3d 19h  ACTUAL LENGTH OF STAY:          4                 Justin Robertson MD

## 2021-12-16 NOTE — PROGRESS NOTES
SPEECH PATHOLOGY BEDSIDE SWALLOW EVALUATION/DISCHARGE  Patient: Nicole Kessler (82 y.o. male)  Date: 12/16/2021  Primary Diagnosis: Near syncope [R55]  SOB (shortness of breath) [R06.02]  CHF exacerbation (HCC) [I50.9]  Borderline low O2 saturation [R79.81]  Dizziness [R42]  Syncope, near [R55]        Precautions:  Fall    ASSESSMENT :  Based on the objective data described below, the patient presents with functional oropharyngeal swallow with no difficulties or s/s of aspiration appreciated at bedside with any consistencies. Patient and his family member bedside deny any speech or swallowing concerns and he has been tolerating regular diet. Esophagram showing esophageal dysmotility and reflux. Therefore, recommend regular diet/thin liquids with esophageal precautions. Suspect pt is at baseline swallow function and therefore, skilled acute therapy provided by a speech-language pathologist is not indicated at this time. SLP will sign off. Please reconsult with any changes or if SLP can be of any further assistance.      PLAN :  Recommendations:  -- regular diet/ thin liquids  -- upright for all PO and 30 minutes after  -- small bites and sips  -- alternate liquids and solids  -- SLP will sign off     Discharge Recommendations: None     SUBJECTIVE:   Patient stated his uvula vibrates while snoring.     OBJECTIVE:     Past Medical History:   Diagnosis Date    Arrhythmia     AFib    Arthritis     Atrial fibrillation ([address]     Cancer ([address]     right lung, skin cancer, bladder (instilled medication in bladder for tx)    Chronic kidney disease     Diabetes ([address]     NIDDM    Gout     Hypertension     Hypothyroidism     Ill-defined condition     neuropathy in feet    Other ill-defined conditions(799.89)     high cholesterol    Pacemaker     Psychiatric disorder     depression    Sleep apnea     uses CPAP     Past Surgical History:   Procedure Laterality Date    COLONOSCOPY N/A 8/8/2017    COLONOSCOPY performed by Kaley Dodson MD at [facility] ENDOSCOPY    COLONOSCOPY N/A 2/22/2019    COLONOSCOPY performed by Omero Garnica MD at [facility] ENDOSCOPY    ECHO 2D ADULT  9/2009    LVH, LAE, mild MR, EF 55-60%    HX CATARACT REMOVAL Bilateral     with lens implants    HX HEART CATHETERIZATION  2009    HX HEENT      tonsillectomy    HX KNEE ARTHROSCOPY Right     menisectomy    HX LUMBAR LAMINECTOMY      back surgery / hardware    HX OTHER SURGICAL  4/28/14    BRONCHOSCOPY/RIGHT VIDEO ASSISTED THORACOSCOPY,  2.RIGHT UPPER LOBE WEDGE RESECTION 3. COMPLETION RIGHT UPPER LOBECTOMY 4. MEDIASTINAL LYMPH NODE DISSECTION 5. INTERCOSTAL BLOCK    HX OTHER SURGICAL  4/28/14    Right Thoracoscopy, Ligation of inferior pulmonary ligament bleeding with prolene/clips    HX PACEMAKER Left 2011    AICD--Dr. Sarah Herbert    HX UROLOGICAL      bladder tumor    [place] CARDIAC SURG PROCEDURE UNLIST  2010    ablation,  Pacer/defibralator    STRESS TEST CARDIOLITE  4/23/2010    exercise/lexiscan study - poor exercise capacity, normal perfusion, EF 62%    THORACOSCOPY SURG WEDG RESEC LUNG  2015    RUL     Prior Level of Function/Home Situation:   Home Situation  Home Environment: Independent living ([facility])  # Steps to Enter: 0  One/Two Story Residence: Other (Comment) (elevator)  Living Alone: No  Support Systems: Child(beverly),Assisted Living  Patient Expects to be Discharged to[de-identified] Independent living facility  Current DME Used/Available at Home: Cosmo Barnes, rollator  Diet prior to admission: regular/thin  Current Diet:  Regular/thin   Cognitive and Communication Status:  Neurologic State: Sleeping,Alert  Orientation Level: Oriented X4  Cognition: Follows commands  Perception: Appears intact  Perseveration: No perseveration noted  Safety/Judgement: Awareness of environment  Oral Assessment:  Oral Assessment  Labial: No impairment  Dentition: Natural  Oral Hygiene: moist oral mucosa  Lingual: No impairment  Velum: No impairment  Mandible: No impairment  P.O. Trials:  Patient Position: upright   Vocal quality prior to P.O.: No impairment  Consistency Presented: Other (comment) (breakfast tray)  How Presented: Self-fed/presented (breakfast tray)     Bolus Acceptance: No impairment  Bolus Formation/Control: No impairment     Propulsion: No impairment  Oral Residue: None  Initiation of Swallow: No impairment  Laryngeal Elevation: Functional  Aspiration Signs/Symptoms: None  Pharyngeal Phase Characteristics: No impairment, issues, or problems   Effective Modifications: None          Oral Phase Severity: No impairment  Pharyngeal Phase Severity : No impairment  NOMS:   The NOMS functional outcome measure was used to quantify this patient's level of swallowing impairment. Based on the NOMS, the patient was determined to be at level 7 for swallow function     NOMS Swallowing Levels:  Level 1 (CN): NPO  Level 2 (CM): NPO but takes consistency in therapy  Level 3 (CL): Takes less than 50% of nutrition p.o. and continues with nonoral feedings; and/or safe with mod cues; and/or max diet restriction  Level 4 (CK): Safe swallow but needs mod cues; and/or mod diet restriction; and/or still requires some nonoral feeding/supplements  Level 5 (CJ): Safe swallow with min diet restriction; and/or needs min cues  Level 6 (CI): Independent with p.o.; rare cues; usually self cues; may need to avoid some foods or needs extra time  Level 7 ([address] [place] [address]): Independent for all p.o.  SANTIAGO. (2003). National Outcomes Measurement System (NOMS): Adult Speech-Language Pathology User's Guide. Pain:  Pain Scale 1: Numeric (0 - 10)  Pain Intensity 1: 0     After treatment:   Patient left in no apparent distress in bed, Call bell within reach, Nursing notified and Caregiver / family present    COMMUNICATION/EDUCATION:     The patient's plan of care including recommendations, planned interventions, and recommended diet changes were discussed with: Registered nurse.      Thank you for this referral.  Christopher Butler M.S. CF-SLP   Speech Language Pathologist     Time Calculation: 14 mins

## 2021-12-16 NOTE — PROGRESS NOTES
Cardiology Progress Note                                        Admit Date: 12/12/2021    Assessment/Plan:     CHF; acute on chronic diastolic; improving; continue current regimen  CKD; continue current regimen; bmp in am  S/p pacer; needs a new generator in next month  CAD; asymptomatic    Gayathri Noss. is a 82 y.o. male with     PROBLEM LIST:  Patient Active Problem List    Diagnosis Date Noted    Near syncope 12/12/2021    Dizziness 12/12/2021    SOB (shortness of breath) 12/12/2021    CHF exacerbation ([facility] 12/12/2021    Syncope, near 12/12/2021    Borderline low O2 saturation 12/12/2021    Thrombocytopenia ([facility] 05/01/2014    HTN (hypertension) 04/29/2014    CKD (chronic kidney disease) stage 3, GFR 30-59 ml/min ([facility]) 04/29/2014    Hyperkalemia 04/29/2014    Respiratory failure, post-operative ([facility] 04/29/2014    Anemia, blood loss 49/59/0683    Metabolic acidosis, normal anion gap (NAG) 04/29/2014    COPD (chronic obstructive pulmonary disease) ([facility] 04/29/2014    SAMIR (obstructive sleep apnea) 04/29/2014    Adenocarcinoma of lung ([facility] 04/29/2014    HEMALATHA (acute kidney injury) ([facility] 04/29/2014    Lung nodules 04/01/2014    Occlusion and stenosis of carotid artery without mention of cerebral infarction 03/22/2011    Cardiac pacemaker in situ 12/08/2010    Arrhythmia Atrial Flutter 11/02/2010    Bradycardia Sick Sinus Syndrome 11/02/2010    CAD (coronary artery disease), native coronary artery 11/02/2010    DM (Diabetes Mellitus-Adult Onset) 11/02/2010    First degree atrioventricular block 11/02/2010    Right bundle branch block and left anterior fascicular block 11/02/2010    Benign hypertensive heart disease without heart failure 11/02/2010    Paroxysmal atrial fibrillation ([facility] 11/02/2010    Sleep apnea 11/02/2010         Subjective:     Wilfredoa Noss. reports none.     Visit Vitals  /73 (BP 1 Location: Right upper arm, BP Patient Position: At rest)   Pulse 87 Temp 97.8 °F (36.6 °C)   Resp 18   Ht 5' 7\" (1.702 m)   Wt 209 lb 10.5 oz (95.1 kg)   SpO2 93%   BMI 32.84 kg/m²     No intake or output data in the 24 hours ending 12/16/21 1608    Objective:      Physical Exam:  HEENT: Perrla, EOMI  Neck: No JVD,  No thyroidmegaly  Resp: CTA bilaterally;  No wheezes or rales  CV: RRR s1s2 No murmur no s3  Abd:Soft, Nontender  Ext: No edema  Neuro: Alert and oriented; Nonfocal  Skin: Warm, Dry, Intact  Pulses: 2+ DP/PT/Rad      Telemetry: normal sinus rhythm    Current Facility-Administered Medications   Medication Dose Route Frequency    albuterol-ipratropium (DUO-NEB) 2.5 MG-0.5 MG/3 ML  3 mL Nebulization Q6H PRN    bumetanide (BUMEX) tablet 1 mg  1 mg Oral DAILY    polyethylene glycol (MIRALAX) packet 17 g  17 g Oral BID PRN    bisacodyL (DULCOLAX) suppository 10 mg  10 mg Rectal DAILY PRN    metoprolol succinate (TOPROL-XL) XL tablet 25 mg  25 mg Oral DAILY    apixaban (ELIQUIS) tablet 2.5 mg  2.5 mg Oral Q12H    levothyroxine (SYNTHROID) tablet 150 mcg  150 mcg Oral ACB    atorvastatin (LIPITOR) tablet 20 mg  20 mg Oral DAILY    ezetimibe (ZETIA) tablet 10 mg  10 mg Oral DAILY    escitalopram oxalate (LEXAPRO) tablet 20 mg  20 mg Oral DAILY    tamsulosin (FLOMAX) capsule 0.4 mg  0.4 mg Oral DAILY    colestipoL (COLESTID) tablet 2 g  2 g Oral BID PRN    insulin lispro (HUMALOG) injection   SubCUTAneous AC&HS    glucose chewable tablet 16 g  4 Tablet Oral PRN    dextrose (D50W) injection syrg 12.5-25 g  12.5-25 g IntraVENous PRN    glucagon (GLUCAGEN) injection 1 mg  1 mg IntraMUSCular PRN    ondansetron (ZOFRAN) injection 4 mg  4 mg IntraVENous Q6H PRN    melatonin tablet 6 mg  6 mg Oral QHS PRN    acetaminophen (TYLENOL) tablet 650 mg  650 mg Oral Q6H PRN         Data Review:   Labs:    Recent Results (from the past 24 hour(s))   GLUCOSE, POC    Collection Time: 12/15/21  4:28 PM   Result Value Ref Range    Glucose (POC) 128 (H) 65 - 117 mg/dL    Performed by Edwar Naik (CNA)    GLUCOSE, POC    Collection Time: 12/15/21  9:40 PM   Result Value Ref Range    Glucose (POC) 133 (H) 65 - 117 mg/dL    Performed by Taran Yu   [facility]    GLUCOSE, POC    Collection Time: 12/16/21  8:16 AM   Result Value Ref Range    Glucose (POC) 105 65 - 117 mg/dL    Performed by Nikki Oswald, POC    Collection Time: 12/16/21 11:46 AM   Result Value Ref Range    Glucose (POC) 121 (H) 65 - 117 mg/dL    Performed by Mario Siegel

## 2021-12-17 NOTE — PROGRESS NOTES
Bedside shift change report given to Tita Grayson RN by Linn Matute RN Report included the following information SBAR, Kardex, Intake/Output, MAR and Recent Results.

## 2021-12-17 NOTE — PROGRESS NOTES
12/17/21 0758   Mike Fall Risk   Mobility 1.0   Mobility Interventions Bed/chair exit alarm; Patient to call before getting OOB   Mentation 0   Medication 1   Medication Interventions Bed/chair exit alarm; Patient to call before getting OOB; Teach patient to arise slowly   Elimination 1.0   Elimination Interventions Call light in reach;Bed/chair exit alarm; Toilet paper/wipes in reach; Toileting schedule/hourly rounds   Prior Fall History 1   History of Falls Interventions Bed/chair exit alarm;Evaluate medications/consider consulting pharmacy   Total Score 4   Standard Fall Precautions Yes   High Fall Risk Yes     Pt is high fall risk, please coordinate with PT for six minute walk test.

## 2021-12-17 NOTE — PROGRESS NOTES
Attempted to schedule hospital follow up PCP appointment. Awaiting call back from the office with appointment information. Pending patient discharge.   Marii James, Care Management Specialist.

## 2021-12-17 NOTE — DISCHARGE INSTRUCTIONS
Patient Education        Heart Failure: Care Instructions  Your Care Instructions     Heart failure occurs when your heart does not pump as much blood as the body needs. Failure does not mean that the heart has stopped pumping but rather that it is not pumping as well as it should. Over time, this causes fluid buildup in your lungs and other parts of your body. Fluid buildup can cause shortness of breath, fatigue, swollen ankles, and other problems. By taking medicines regularly, reducing sodium (salt) in your diet, checking your weight every day, and making lifestyle changes, you can feel better and live longer. Follow-up care is a key part of your treatment and safety. Be sure to make and go to all appointments, and call your doctor if you are having problems. It's also a good idea to know your test results and keep a list of the medicines you take. How can you care for yourself at home? Medicines    · Be safe with medicines. Take your medicines exactly as prescribed. Call your doctor if you think you are having a problem with your medicine.     · Do not take any vitamins, over-the-counter medicine, or herbal products without talking to your doctor first. Yessenia Bradshaw not take ibuprofen (Advil or Motrin) and naproxen (Aleve) without talking to your doctor first. They could make your heart failure worse.     · You may take some of the following medicine. ? Angiotensin-converting enzyme inhibitors (ACEIs) or angiotensin II receptor blockers (ARBs) reduce the heart's workload, lower blood pressure, and reduce swelling. Taking an ACEI or ARB may lower your chance of needing to be hospitalized. ? Beta-blockers can slow heart rate, decrease blood pressure, and improve your condition. Taking a beta-blocker may lower your chance of needing to be hospitalized. ? Diuretics, also called water pills, reduce swelling. You will get more details on the specific medicines your doctor prescribes.   Diet    · Your doctor may suggest that you limit sodium. Your doctor can tell you how much sodium is right for you. An example is less than 3,000 mg a day. This includes all the salt you eat in cooking or in packaged foods. People get most of their sodium from processed foods. Fast food and restaurant meals also tend to be very high in sodium.     · Ask your doctor how much liquid you can drink each day. You may have to limit liquids. Weight    · Weigh yourself without clothing at the same time each day. Record your weight. Call your doctor if you have a sudden weight gain, such as more than 2 to 3 pounds in a day or 5 pounds in a week. (Your doctor may suggest a different range of weight gain.) A sudden weight gain may mean that your heart failure is getting worse. Activity level    · Start light exercise (if your doctor says it is okay). Even if you can only do a small amount, exercise will help you get stronger, have more energy, and manage your weight and your stress. Walking is an easy way to get exercise. Start out by walking a little more than you did before. Bit by bit, increase the amount you walk.     · When you exercise, watch for signs that your heart is working too hard. You are pushing yourself too hard if you cannot talk while you are exercising. If you become short of breath or dizzy or have chest pain, stop, sit down, and rest.     · If you feel \"wiped out\" the day after you exercise, walk slower or for a shorter distance until you can work up to a better pace.     · Get enough rest at night. Sleeping with 1 or 2 pillows under your upper body and head may help you breathe easier. Lifestyle changes    · Do not smoke. Smoking can make a heart condition worse. If you need help quitting, talk to your doctor about stop-smoking programs and medicines. These can increase your chances of quitting for good.  Quitting smoking may be the most important step you can take to protect your heart.     · Limit alcohol to 2 drinks a day for men and 1 drink a day for women. Too much alcohol can cause health problems.     · Avoid getting sick from colds and the flu. Get a pneumococcal vaccine shot. If you have had one before, ask your doctor whether you need another dose. Get a flu shot each year. If you must be around people with colds or the flu, wash your hands often. When should you call for help? Call 911  if you have symptoms of sudden heart failure such as:    · You have severe trouble breathing.     · You cough up pink, foamy mucus.     · You have a new irregular or rapid heartbeat. Call your doctor now or seek immediate medical care if:    · You have new or increased shortness of breath.     · You are dizzy or lightheaded, or you feel like you may faint.     · You have sudden weight gain, such as more than 2 to 3 pounds in a day or 5 pounds in a week. (Your doctor may suggest a different range of weight gain.)     · You have increased swelling in your legs, ankles, or feet.     · You are suddenly so tired or weak that you cannot do your usual activities. Watch closely for changes in your health, and be sure to contact your doctor if you develop new symptoms. Where can you learn more? Go to http://www.gray.com/  Enter O866 in the search box to learn more about \"Heart Failure: Care Instructions. \"  Current as of: April 29, 2021               Content Version: 13.0  © 8938-8106 [employer]. Care instructions adapted under license by [employer] (which disclaims liability or warranty for this information). If you have questions about a medical condition or this instruction, always ask your healthcare professional. [place] [address] any warranty or liability for your use of this information. Discharge Instructions       PATIENT ID: Sarath Johnston.   MRN: [id number]   YOB: 1935    DATE OF ADMISSION: 12/12/2021 10:33 AM    DATE OF DISCHARGE: 12/17/2021    PRIMARY CARE PROVIDER: Harvinder Sheets MD     ATTENDING PHYSICIAN: Isa Duane, MD  DISCHARGING PROVIDER: Ryan Miramontes MD    To contact this individual call [phone] and ask the  to page. If unavailable ask to be transferred the Adult Hospitalist Department. DISCHARGE DIAGNOSES and CARE RECOMMENDATIONS:     Acute on chronic diastolic congestive heart failure  --Please take your medications as prescribed. Please note the Bumex dose is increased to 2 mg daily. 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 Please follow up with your pulmonary doctor in the office for further treatment recommendations. --Follow up with Dr Tamar Diaz as planned. --Please read the above instructions regarding home monitoring of congestive heart failure    --We recommend you see your primary doctor in a week after discharge for follow up and you also need follow up blood work to check for the kidney numbers and electrolytes. When you call your primary doctor office,please let them know you were recently discharged from the hospital.          DIET: Regular Diet and Cardiac Diet      ACTIVITY: Activity as tolerated and PT/OT Eval and Treat    SERVICES and EQUIPMENT needed: own    PENDING TEST RESULTS:   At the time of discharge the following test results are still pending: none    FOLLOW UP APPOINTMENTS:   Follow-up Information     Follow up With Specialties Details Why Contact Holger Bryant MD [address]  [address] [id number] [phone]               YOU WERE SEEN BY THE FOLLOWING CONSULTATIONS:   IP CONSULT TO HOSPITALIST  IP CONSULT TO CARDIOLOGY  IP CONSULT TO PULMONOLOGY    YOU HAD THE FOLLOWING PROCEDURES/SURGERIES  :   * No surgery found *              DISCHARGE MEDICATIONS:   See Medication Reconciliation Form    · It is important that you take the medication exactly as they are prescribed. · Keep your medication in the bottles provided by the pharmacist and keep a list of the medication names, dosages, and times to be taken in your wallet. · Do not take other medications without consulting your doctor. NOTIFY YOUR PHYSICIAN FOR ANY OF THE FOLLOWING:   Fever over 101 degrees for 24 hours. Chest pain, shortness of breath, fever, chills, nausea, vomiting, diarrhea, change in mentation, falling, weakness, bleeding. Severe pain or pain not relieved by medications. Or, any other signs or symptoms that you may have questions about. Services you need on discharge     [address] [place] [address]... PHYSICAL THERAPY x   OCCUPATIONAL THERAPY x   HOSPITAL TO HOME VISIT     [facility]          Signed: Louise Kingston MD  12/17/2021  11:23 AM    [facility]/ED Visit Follow-Up Instructions/Information    You may have an in home follow up visit set up with [facility] or may wish to contact [facility] to set-up a visit:    What are we? [facility] is an in-home urgent care service staffed with emergency trained medical teams. We come to your home in a vehicle stocked with medical supplies and technology. An ER physician is always available if needed. When? As a part of your hospital follow-up, an appointment has been/ or can be set up for us to come see you. Usually, this will be 24-72 hours after you leave the hospital or as needed. [facility] is open 7am-9pm, 7 days a week, 365 days a year, including holidays. Why? We know that you cannot always get to your doctor after being in the hospital and that your doctor is not always available when you need them. Once your workup is complete, we'll call in your prescriptions, update your family doctor, and handle billing with your insurance so you can focus on feeling better, faster without leaving home. How much?   We accept most major health insurance plans, including Medicaid, Medicare, and Medicare Advantage [place], [facility], [employer], and [employer] We also accept: credit, debit, health savings account (HSA), health reimbursement account (HRA) and flexible spending account (FSA) payments. "[employer]s prices compare to conventional urgent care facilities, but we bring the care to you. How to reach us? Getting care is easy- use our mobile zee ([employer]), website ([email or web address]) or call us [phone].

## 2021-12-17 NOTE — NURSE NAVIGATOR
Called [facility] to schedule cardiology follow up. Awaiting return call from [place].     Valeria called back and scheduled patient with Lacho Mills NP on 12/29/21 at 2:30 PM.

## 2021-12-17 NOTE — PROGRESS NOTES
Cardiology Progress Note                                        Admit Date: 12/12/2021    Assessment/Plan:     CHF; stable; his weight is going up slowly again; will increase Bumex to 2 mg a day as his CKD is stable; he can be discharged and follow up with Kacie Villagomez within two weeks  CKD; stable  CAD; asymptomatic  [facility] [place]  S/p pacer; needs new generator; Dr Kacie Pederson will do this next month    Leidy Rashid is a 82 y.o. male with     PROBLEM LIST:  Patient Active Problem List    Diagnosis Date Noted    Near syncope 12/12/2021    Dizziness 12/12/2021    SOB (shortness of breath) 12/12/2021    CHF exacerbation ([address] 12/12/2021    Syncope, near 12/12/2021    Borderline low O2 saturation 12/12/2021    Thrombocytopenia ([address] 05/01/2014    HTN (hypertension) 04/29/2014    CKD (chronic kidney disease) stage 3, GFR 30-59 ml/min ([facility]) 04/29/2014    Hyperkalemia 04/29/2014    Respiratory failure, post-operative ([address] 04/29/2014    Anemia, blood loss 85/64/1549    Metabolic acidosis, normal anion gap (NAG) 04/29/2014    COPD (chronic obstructive pulmonary disease) ([address] 04/29/2014    SAMIR (obstructive sleep apnea) 04/29/2014    Adenocarcinoma of lung ([address] 04/29/2014    HEMALATHA (acute kidney injury) ([facility] 04/29/2014    Lung nodules 04/01/2014    Occlusion and stenosis of carotid artery without mention of cerebral infarction 03/22/2011    Cardiac pacemaker in situ 12/08/2010    Arrhythmia Atrial Flutter 11/02/2010    Bradycardia Sick Sinus Syndrome 11/02/2010    CAD (coronary artery disease), native coronary artery 11/02/2010    DM (Diabetes Mellitus-Adult Onset) 11/02/2010    First degree atrioventricular block 11/02/2010    Right bundle branch block and left anterior fascicular block 11/02/2010    Benign hypertensive heart disease without heart failure 11/02/2010    Paroxysmal atrial fibrillation ([address] 11/02/2010    Sleep apnea 11/02/2010         Subjective:     Jeet Davenport Lora Marley. reports none. Visit Vitals  /75 (BP 1 Location: Right upper arm, BP Patient Position: At rest)   Pulse 87   Temp 98 °F (36.7 °C)   Resp 17   Ht 5' 7\" (1.702 m)   Wt 210 lb 1.6 oz (95.3 kg)   SpO2 93%   BMI 32.91 kg/m²     No intake or output data in the 24 hours ending 12/17/21 0905    Objective:      Physical Exam:  HEENT: [place], EOMI  Neck: No JVD,  No thyroidmegaly  Resp: CTA bilaterally;  No wheezes or rales  CV: irregular s1s2 No murmur no s3  Abd:Soft, Nontender  Ext: No edema  Neuro: Alert and oriented; Nonfocal  Skin: Warm, Dry, Intact  Pulses: 2+ DP/PT/Rad      Telemetry: AFIB    Current Facility-Administered Medications   Medication Dose Route Frequency    albuterol-ipratropium (DUO-NEB) 2.5 MG-0.5 MG/3 ML  3 mL Nebulization Q6H PRN    bumetanide (BUMEX) tablet 1 mg  1 mg Oral DAILY    polyethylene glycol (MIRALAX) packet 17 g  17 g Oral BID PRN    bisacodyL (DULCOLAX) suppository 10 mg  10 mg Rectal DAILY PRN    metoprolol succinate (TOPROL-XL) XL tablet 25 mg  25 mg Oral DAILY    apixaban (ELIQUIS) tablet 2.5 mg  2.5 mg Oral Q12H    levothyroxine (SYNTHROID) tablet 150 mcg  150 mcg Oral ACB    atorvastatin (LIPITOR) tablet 20 mg  20 mg Oral DAILY    ezetimibe (ZETIA) tablet 10 mg  10 mg Oral DAILY    escitalopram oxalate (LEXAPRO) tablet 20 mg  20 mg Oral DAILY    tamsulosin (FLOMAX) capsule 0.4 mg  0.4 mg Oral DAILY    colestipoL (COLESTID) tablet 2 g  2 g Oral BID PRN    insulin lispro (HUMALOG) injection   SubCUTAneous AC&HS    glucose chewable tablet 16 g  4 Tablet Oral PRN    dextrose (D50W) injection syrg 12.5-25 g  12.5-25 g IntraVENous PRN    glucagon (GLUCAGEN) injection 1 mg  1 mg IntraMUSCular PRN    ondansetron (ZOFRAN) injection 4 mg  4 mg IntraVENous Q6H PRN    melatonin tablet 6 mg  6 mg Oral QHS PRN    acetaminophen (TYLENOL) tablet 650 mg  650 mg Oral Q6H PRN         Data Review:   Labs:    Recent Results (from the past 24 hour(s))   GLUCOSE, POC Collection Time: 12/16/21 11:46 AM   Result Value Ref Range    Glucose (POC) 121 (H) 65 - 117 mg/dL    Performed by Nikki Oswald, POC    Collection Time: 12/16/21  4:51 PM   Result Value Ref Range    Glucose (POC) 108 65 - 117 mg/dL    Performed by Julian Morfin, POC    Collection Time: 12/16/21  9:35 PM   Result Value Ref Range    Glucose (POC) 126 (H) 65 - 117 mg/dL    Performed by Nancy MARTINEZ)    CBC WITH AUTOMATED DIFF    Collection Time: 12/17/21  3:41 AM   Result Value Ref Range    WBC 5.1 4.1 - 11.1 K/uL    RBC 2.87 (L) 4.10 - 5.70 M/uL    HGB 8.9 (L) 12.1 - 17.0 g/dL    HCT 28.0 (L) 36.6 - 50.3 %    MCV 97.6 80.0 - 99.0 FL    MCH 31.0 26.0 - 34.0 PG    MCHC 31.8 30.0 - 36.5 g/dL    RDW 14.2 11.5 - 14.5 %    PLATELET 388 (L) 920 - 400 K/uL    MPV 9.5 8.9 - 12.9 FL    NRBC 0.0 0  WBC    ABSOLUTE NRBC 0.00 0.00 - 0.01 K/uL    NEUTROPHILS 61 32 - 75 %    LYMPHOCYTES 27 12 - 49 %    MONOCYTES 7 5 - 13 %    EOSINOPHILS 3 0 - 7 %    BASOPHILS 1 0 - 1 %    IMMATURE GRANULOCYTES 1 (H) 0.0 - 0.5 %    ABS. NEUTROPHILS 3.2 1.8 - 8.0 K/UL    ABS. LYMPHOCYTES 1.4 0.8 - 3.5 K/UL    ABS. MONOCYTES 0.3 0.0 - 1.0 K/UL    ABS. EOSINOPHILS 0.1 0.0 - 0.4 K/UL    ABS. BASOPHILS 0.0 0.0 - 0.1 K/UL    ABS. IMM. GRANS. 0.1 (H) 0.00 - 0.04 K/UL    DF AUTOMATED     METABOLIC PANEL, COMPREHENSIVE    Collection Time: 12/17/21  3:42 AM   Result Value Ref Range    Sodium 139 136 - 145 mmol/L    Potassium 4.3 3.5 - 5.1 mmol/L    Chloride 106 97 - 108 mmol/L    CO2 26 21 - 32 mmol/L    Anion gap 7 5 - 15 mmol/L    Glucose 101 (H) 65 - 100 mg/dL    BUN 67 (H) 6 - 20 MG/DL    Creatinine 3.30 (H) 0.70 - 1.30 MG/DL    BUN/Creatinine ratio 20 12 - 20      GFR est AA 22 (L) >60 ml/min/1.73m2    GFR est non-AA 18 (L) >60 ml/min/1.73m2    Calcium 8.7 8.5 - 10.1 MG/DL    Bilirubin, total 0.5 0.2 - 1.0 MG/DL    ALT (SGPT) 38 12 - 78 U/L    AST (SGOT) 34 15 - 37 U/L    Alk.  phosphatase 80 45 - 117 U/L    Protein, total 6.1 (L) 6.4 - 8.2 g/dL    Albumin 2.9 (L) 3.5 - 5.0 g/dL    Globulin 3.2 2.0 - 4.0 g/dL    A-G Ratio 0.9 (L) 1.1 - 2.2     GLUCOSE, POC    Collection Time: 12/17/21  8:15 AM   Result Value Ref Range    Glucose (POC) 111 65 - 117 mg/dL    Performed by [facility]  PCT

## 2021-12-17 NOTE — PROGRESS NOTES
Transition of Care  1. RUR 20% high  2. Disposition Home @ [facility]Nurse to call report to Jose Krause (BILLY) at [phone]  3. DME Rollator and cpap  4. Transportation Daughter  5. Follow Up PCP, Specialist      CM participated in [facility] Discharge for HF dx with bedside nursing and   attending, to include:    - Teach back method for subjects including:    *Confirmation of functioning home scale and plan of daily weights with   tracking after first morning void, preferably nude  *Low sodium diet of 1500 mg (1/3 teaspoon) or less/day  *Applicable fluid restrictions that pertain to patient  *Contacting MD if there is a 3 lb weight gain in 1 day or 5 lb weight gain   in 7 days  *Follow Up Appt plan for Cardio: ___1/20/22  *Follow Up Appt plan for PCP: ___  *Confirmation of medication pick-up at: ___[facility]  *Dispatch Health information on AVS  *Review of updated med rec list and request to bring to all follow-up   appointments  *Discharge Transport via: ___Daughter   *Home Support via: ___[facility]  *DME including: ___NA  *Offering of empathy and listening to any concerns in attempts address discharge concerns prior to agreed upon safe discharge. All questions answered. SMAART-E checklist completed and placed in appropriate folder. *CM attempted phone call to Jose Krause at [facility], left voice message informing of patient DC. CM to fax DC summary when available. *    Medicare pt has received, reviewed, and signed 2nd IM letter informing them of their right to appeal the discharge. Signed copy has been placed on pt bedside chart. Paulina Saldana MS    12:30 Patient and daughter agreeable to visit with [employer]-flyer provided. CM completed referral to [facility] specialist for scheduling.      Paulina Saldana MS

## 2021-12-17 NOTE — PROGRESS NOTES
Chart checked, attempted to see pt for a six minute walk test and note that pt was up amb with his nurse in the hallway doing the 6 minute walk test. Please see her documentation for the results of that test. Pt's gait looked stable with a rolling walker for support (he uses a rolling walker at baseline). Note plan for discharge today.  Li Sheppard, PT

## 2021-12-18 NOTE — DISCHARGE SUMMARY
Discharge Summary       PATIENT ID: Kiya Roque MRN: [id number]   YOB: 1935    DATE OF ADMISSION: 12/12/2021 10:33 AM    DATE OF DISCHARGE: 12/17/2021   PRIMARY CARE PROVIDER: Naresh Rogel MD     ATTENDING PHYSICIAN: Geneva March MD    DISCHARGING PROVIDER: Geneva March MD    To contact this individual call [phone] and ask the  to page. If unavailable ask to be transferred the Adult Hospitalist Department. CONSULTATIONS: IP CONSULT TO PULMONOLOGY    PROCEDURES/SURGERIES: * No surgery found *    ADMITTING [address] [place] [address] COURSE:      Admission Summary:   83 y/o male with verbal report by daughter describing hx of systolic CHF (low EF requiring defibrillator, cardiologist Hermelindo Ann from [address] [place] [address]), hypothyroidism (on replacement tx), atrial fibrillation (on apixaban), renal insufficiency (from past and recent labs), diabetes, noninfectious diarrhea (worked up by outside [employer]), who presented in ER for near syncope/dizziness/sob/generalized weakness,etc. O2 sat reduced to low 90's, CXR initially reporting right pleural effusion and pulm htn? . Troponin negative x2, but elevated d-dimer and probnp. Pt is admitted for chf exacerbation with eval of other items of complaints. DISCHARGE DIAGNOSES / PLAN:    Acute on chronic diastolic CHF NYHA class III on admission,class I on discharge. --Diuresed well, now on oral bumetanide continue. Patient discharged on bumetanide 2 mg daily from 0.5 mg daily that he was on prior to admission. .  --Continue Toprol. --Sniff test showed right diaphragmatic paresis which could impart explained the patient's symptoms. He will need outpatient follow-up. History of regurgitation of liquids  -GI consulted.   Recommending an esophagram for now and then if an oropharyngeal deficit is identified will see patient  -Esophagram 12/15  reviewed: Esophageal dysmotility with small hiatal hernia and probable gastroesophageal  reflux.   -Speech evaluated and recommended regular diet, thin liquids, upright for all p.o. and 30 minutes after, small bites and sips, alternate liquids and solids. SAMIR. Uses nasal CPAP, has been a while since he had evaluation. Advised patient to have evaluation to make sure his CPAP setting remains adequate     R/o PE, elevated D-dimer in setting of sob with O2 sat slightly reduced on admission  -dopplers negative for DVT  -low probability on V/Q scan     Hx of Lung cancer s/p resection hx  -refer to above regarding tx   -pt/daughter denies recurrence     Atrial fibrillation  -rate controlled  -on metoprolol  -on apixaban     Hypothyroidism  -tsh very minimally above normal range  -would keep the current levothyroxine home dose and recommend repeat as outpatient when pt is stable     Urinary hesitancy and poor urine output in setting of known renal insufficiency (chronic), has seen urologist  \"Dr. Burt\" in the past  -resume home flomax  -resume diuretics for now  -12/13 the patient is indicating he did not urinate today though he states he went to be bathroom today to do so---discussed with daughter/patient, ordered bladder scan  - 12/15: No problems urinating         Hx of noninfectious diarrhea  -resume prn colestipol     Hx of chronic thrombocytopenia  -plt count stable     Diabetes  -ssi with accuchecks and carb consistent diet     Dyslipidemia history  -presumed as pt on home low dose lipitor and zetia     Renal insufficiency  -known by daughter  -slightly improved srcr, trend     Anemia, chronic and in setting of chronic renal insufficiency     Mood disorder history?  -pt on lexapro at home    Discharge care plan and recommendations were discussed with patient and his daughter who was at the bedside on the day of discharge, all questions answered.   Discharge heart failure education provided with the following multidisciplinary team at the bedside: RN,  and myself. PENDING TEST RESULTS:   At the time of discharge the following test results are still pending: None    FOLLOW UP APPOINTMENTS:    Follow-up Information     Follow up With Specialties Details Why Contact Kimberly Bryant, [address] [facility]  [phone]      Estefana Goodell, MD Cardio Vascular Surgery, Clinical Cardiac Electrophysiology, Cardiology On 12/29/2021 Appointment with Anamaria Henriquez NP at 2:30 PM.  [address]  [address]  [phone] [id number] [phone]               DIET: Regular Diet and Cardiac Diet    ACTIVITY: Activity as tolerated and PT/OT per [address] needed: Own      DISCHARGE MEDICATIONS:  Discharge Medication List as of 12/17/2021  1:32 PM      START taking these medications    Details   albuterol (PROVENTIL HFA, VENTOLIN HFA, PROAIR HFA) 90 mcg/actuation inhaler Take 2 Puffs by inhalation every six (6) hours as needed for Wheezing., Normal, Disp-18 g, R-0      ondansetron hcl (Zofran) 4 mg tablet Take 1 Tablet by mouth every eight (8) hours as needed for Nausea or Vomiting., Normal, Disp-14 Tablet, R-0         CONTINUE these medications which have CHANGED    Details   bumetanide (BUMEX) 2 mg tablet Take 1 Tablet by mouth daily for 30 days. , Normal, Disp-30 Tablet, R-0         CONTINUE these medications which have NOT CHANGED    Details   colestipoL (COLESTID) 1 gram tablet Take 1 g by mouth two (2) times a day., Historical Med      exenatide microspheres (Bydureon) 2 mg/0.65 mL pnij 2 mg by SubCUTAneous route every seven (7) days. Thursday, Historical Med      apixaban (ELIQUIS) 2.5 mg tablet Take 2.5 mg by mouth two (2) times a day., Historical Med      Cholecalciferol, Vitamin D3, (VITAMIN D3) 2,000 unit cap capsule Take 5,000 Units by mouth daily. , Historical Med      tamsulosin (FLOMAX) 0.4 mg capsule Take 0.4 mg by mouth daily. , Historical Med      metoprolol succinate (TOPROL-XL) 25 mg XL tablet Take 25 mg by mouth nightly., Historical Med      SYNTHROID 150 mcg tablet 150 mcg daily. , Historical Med, R-0      escitalopram (LEXAPRO) 20 mg tablet Take 20 mg by mouth daily. , Historical Med      ezetimibe (ZETIA) 10 mg tablet take 1 tablet by mouth once daily, Normal, Disp-30 Tab, R-1      sitagliptin (JANUVIA) 50 mg tablet Take 50 mg by mouth daily. , Historical Med      atorvastatin (LIPITOR) 20 mg tablet Take 20 mg by mouth daily. , Historical Med      acetaminophen (TYLENOL EXTRA STRENGTH) 500 mg tablet Take 500 mg by mouth every six (6) hours as needed for Pain., Historical Med      colchicine 0.6 mg tablet Take 0.6 mg by mouth daily as needed., Historical Med         STOP taking these medications       bismuth subsalicylate (PEPTO-BISMOL) 262 mg chew Comments:   Reason for Stopping:                 NOTIFY YOUR PHYSICIAN FOR ANY OF THE FOLLOWING:   Fever over 101 degrees for 24 hours. Chest pain, shortness of breath, fever, chills, nausea, vomiting, diarrhea, change in mentation, falling, weakness, bleeding. Severe pain or pain not relieved by medications. Or, any other signs or symptoms that you may have questions about. DISPOSITION:   x Home With:   OT x PT x HH  RN       Long term SNF/Inpatient Rehab    Independent/assisted living    Hospice    Other:       PATIENT CONDITION AT DISCHARGE:     Functional status    Poor    x Deconditioned     Independent      Cognition    x Lucid     Forgetful     Dementia      Catheters/lines (plus indication)    Mead     PICC     PEG    x None      Code status     Full code    x DNR      PHYSICAL EXAMINATION AT DISCHARGE:  General:          Alert, cooperative, no distress, appears stated age. HEENT:           Atraumatic, anicteric sclerae, pink conjunctivae                          No oral ulcers, mucosa moist, throat clear, dentition fair  Neck:               Supple, symmetrical  Lungs:             Clear to auscultation bilaterally.   No Wheezing or Rhonchi. No rales. Chest wall:      No tenderness  No Accessory muscle use. Heart:              Regular  rhythm,  No  murmur   No edema  Abdomen:        Soft, non-tender. Not distended. Bowel sounds normal  Extremities:     No cyanosis. No clubbing,                            Skin turgor normal, Capillary refill normal  Skin:                Not pale. Not Jaundiced  No rashes   Psych:             Not anxious or agitated.   Neurologic:      Alert, moves all extremities, answers questions appropriately and responds to commands       CHRONIC MEDICAL DIAGNOSES:  Problem List as of 12/17/2021 Date Reviewed: 2/22/2019          Codes Class Noted - Resolved    Near syncope ICD-10-CM: R55  ICD-9-CM: 780.2  12/12/2021 - Present        Dizziness ICD-10-CM: R42  ICD-9-CM: 780.4  12/12/2021 - Present        SOB (shortness of breath) ICD-10-CM: R06.02  ICD-9-CM: 786.05  12/12/2021 - Present        CHF exacerbation ([facility] ICD-10-CM: I50.9  ICD-9-CM: 428.0  12/12/2021 - Present        Syncope, near ICD-10-CM: R55  ICD-9-CM: 780.2  12/12/2021 - Present        Borderline low O2 saturation ICD-10-CM: R79.81  ICD-9-CM: 790.91  12/12/2021 - Present        Thrombocytopenia (HCC) ICD-10-CM: D69.6  ICD-9-CM: 287.5  5/1/2014 - Present        HTN (hypertension) (Chronic) ICD-10-CM: I10  ICD-9-CM: 401.9  4/29/2014 - Present        CKD (chronic kidney disease) stage 3, GFR 30-59 ml/min (HCC) (Chronic) ICD-10-CM: N18.30  ICD-9-CM: 585.3  4/29/2014 - Present        Hyperkalemia (Chronic) ICD-10-CM: E87.5  ICD-9-CM: 276.7  4/29/2014 - Present        Respiratory failure, post-operative ([facility] ICD-10-CM: E71.326  ICD-9-CM: 518.51  4/29/2014 - Present        Anemia, blood loss ICD-10-CM: D50.0  ICD-9-CM: 280.0  4/29/2014 - Present        Metabolic acidosis, normal anion gap (NAG) ICD-10-CM: E87.2  ICD-9-CM: 276.2  4/29/2014 - Present        COPD (chronic obstructive pulmonary disease) ([facility] ICD-10-CM: J44.9  ICD-9-CM: 496  4/29/2014 - Present SAMIR (obstructive sleep apnea) (Chronic) ICD-10-CM: G47.33  ICD-9-CM: 327.23  4/29/2014 - Present        Adenocarcinoma of lung ([facility] ICD-10-CM: C34.90  ICD-9-CM: 162.9  4/29/2014 - Present    Overview Signed 4/29/2014 10:47 AM by Skip HARRIS     1. BRONCHOSCOPY/RIGHT VIDEO ASSISTED THORACOSCOPY, 2.RIGHT UPPER LOBE WEDGE RESECTION 3. COMPLETION RIGHT UPPER LOBECTOMY 4. MEDIASTINAL LYMPH NODE DISSECTION 5. INTERCOSTAL BLOCK 4/28/2014.    1. Right Thoracoscopy   2. Ligation of inferior pulmonary ligament bleeding with prolene/clips 4/28/2014.                HEMALATHA (acute kidney injury) ([facility] ICD-10-CM: N17.9  ICD-9-CM: 584.9  4/29/2014 - Present        Lung nodules ICD-10-CM: R91.8  ICD-9-CM: 793.19  4/1/2014 - Present        Occlusion and stenosis of carotid artery without mention of cerebral infarction ICD-10-CM: I65.29  ICD-9-CM: 433.10  3/22/2011 - Present        Cardiac pacemaker in situ ICD-10-CM: Z95.0  ICD-9-CM: V45.01  12/8/2010 - Present        Arrhythmia Atrial Flutter ICD-10-CM: I48.92  ICD-9-CM: 427.32  11/2/2010 - Present    Overview Signed 3/22/2011  2:34 PM by Sherry Biggs MD     Ablation 2006             Bradycardia Sick Sinus Syndrome ICD-10-CM: I49.5  ICD-9-CM: 427.81  11/2/2010 - Present        CAD (coronary artery disease), native coronary artery ICD-10-CM: I25.10  ICD-9-CM: 414.01  11/2/2010 - Present    Overview Signed 3/22/2011  2:35 PM by Sherry Bigsg MD     Nonobstructive disease by cath 2004  - cath 2004 LAD 50%, RCA 50%, EF 45%             DM (Diabetes Mellitus-Adult Onset) (Chronic) ICD-10-CM: E11.9  ICD-9-CM: 250.00  11/2/2010 - Present        First degree atrioventricular block ICD-10-CM: I44.0  ICD-9-CM: 426.11  11/2/2010 - Present        Right bundle branch block and left anterior fascicular block ICD-10-CM: I45.2  ICD-9-CM: 426.52  11/2/2010 - Present        Benign hypertensive heart disease without heart failure ICD-10-CM: I11.9  ICD-9-CM: 402.10  11/2/2010 - Present Paroxysmal atrial fibrillation (HCC) ICD-10-CM: I48.0  ICD-9-CM: 427.31  11/2/2010 - Present    Overview Signed 3/22/2011  2:34 PM by Sherryle Citizen, MD     Onset 1999  Sinus node dysfunction/bifascicular block   - dual chamber pacer 2010             Sleep apnea ICD-10-CM: G47.30  ICD-9-CM: 780.57  11/2/2010 - Present    Overview Signed 3/22/2011  2:35 PM by Sherryle Citizen, MD     Severe SAMIR  On CPAP ([facility])                   Greater than 30 minutes were spent with the patient on counseling and coordination of care    Signed:    Conner Martinez MD  12/18/2021  7:27 AM

## 2021-12-20 NOTE — PROGRESS NOTES
Care Transitions Initial Call    Call within 2 business days of discharge: Yes     Patient: Roberto Timmons. Patient : 1935 MRN: [id number]    Last Discharge REHABILITATION [facility]       Complaint Diagnosis Description Type Department Provider    21 Dizziness; Shortness of Breath; Vomiting; Diarrhea Near syncope . .. ED to Hosp-Admission (Discharged) (ADMIT) Penny Albert MD; Jimmy Ren. .. Was this an external facility discharge? No     Challenges to be reviewed by the provider   Additional needs identified to be addressed with provider: no  none         Method of communication with provider : none    Discussed COVID-19 related testing which was not done at this time. Advance Care Planning:   Does patient have an Advance Directive:  yes; reviewed and current     Inpatient Readmission Risk score: Unplanned Readmit Risk Score: 20.5 ( )    Was this a readmission? no     Patients top risk factors for readmission: Elderly patient with CHF , COPD, CKD, DM  Interventions to address risk factors: Scheduled appointment with PCP-see goal, Scheduled appointment with Specialist-see goal, Education of patient/family/caregiver/guardian to support self-management-CHF education and Assessment and support for treatment adherence and medication management-confirmed patient compliance with meds    Care Transition Nurse (CTN) contacted the family by telephone to perform post hospital discharge assessment. Verified name and  with family as identifiers. Provided introduction to self, and explanation of the CTN role. CTN reviewed discharge instructions, medical action plan and red flags with family who verbalized understanding. Were discharge instructions available to patient? yes.  Reviewed appropriate site of care based on symptoms and resources available to patient including: PCP and Specialist. Family given an opportunity to ask questions and does not have any further questions or concerns at this time. The family agrees to contact the PCP office for questions related to their healthcare. Medication reconciliation was performed with family, who verbalizes understanding of administration of home medications. Advised obtaining a 90-day supply of all daily and as-needed medications. Referral to Pharm D needed: no     Home Health/Outpatient orders at discharge: [address]: Carly Vega  Date of initial visit: pending, daughter taking script to [facility] today    Durable Medical Equipment ordered at discharge: None      Covid Risk Education    Educated patient about risk for severe COVID-19 due to risk factors according to [facility] guidelines. CTN reviewed discharge instructions, medical action plan and red flag symptoms with the patient who verbalized understanding. Discussed COVID vaccination status: no. Education provided on COVID-19 vaccination as appropriate. Discussed exposure protocols and quarantine with CDC Guidelines. Patient was given an opportunity to verbalize any questions and concerns and agrees to contact CTN or health care provider for questions related to their healthcare. Was patient discharged with a pulse oximeter? No.Daughter states he has a home pulse ox    Discussed follow-up appointments. If no appointment was previously scheduled, appointment scheduling offered: yes. Is follow up appointment scheduled within 7 days of discharge? yes. [facility] follow up appointment(s):   Future Appointments   Date Time Provider Olga Lozada   5/10/2022  8:00 AM [address] [place] [address] RCR PET DOSE 1 One [facility]   5/10/2022  9:00 AM [address] [place] [address] RCR PET 1 JERRI RESENDIZ [facility] follow up appointment(s): [address] [facility] ---today 12/20, Dr Cyn Cabral-- cards 12/29/21    Plan for follow-up call in 7-10 days based on severity of symptoms and risk factors. Plan for next call: symptom management-CHF  CTN provided contact information for future needs.     Goals Addressed This Visit's Progress     Attends follow-up appointments as directed. 12/20/21  CTN reviewed the following follow up appts recommeded at discharge    PCP----daughter reports that she is meeting with patient's [place] MD today to see if she wants to switch PCP. CTN will followup for updates    Dr Gladys Smith, cards--pt has appt with Ramses Mendez 12/29 at 2:30, daughter will take pt to appt    Dispatch health--per pt daughter,patient [facility] informed her that [facility] is not contracted to see patients there. She will have patient seen by [facility] wellness staff. Marie Handy PT--daughter states hospitalist wrote script to have pt seen at [place] in their gym    Patient has nephrologist that he sees every 3 months, Dr Jacolyn Canavan. Douglas lentz call the office to inform them of increase in bumex. Pt has an appt on 1/7/2022    [facility] will notify office of hospital d/c to see if they want pt to come in.    ---mkrw       Understands red flags post discharge. 12/20/21  CTN spoke to patient daughter Rupert Rucker to review discharge instructions/red flags     Daily weights, daughter reports she purchased a digital scale over the weekend and instructed pt to weigh every day in the morning. When CTN contacted pt he had not gotten OOB yet to weigh. Rupert Rucker states she will go to his apt today to get baseline and record weight on chart. Last hospital weight 210lbs   Low sodium diet 1500 mg. Daughter states patient is aware of low sodium diet and knows what he should avoid   Medications: daughter endorses that she picked up new meds and incorporated them into his pill box. She states she fills the pill box weekly and that pt is compliant   Monitor VS and 02 sats---daughter states she will talk to facility to see if they can start taking pt vitals daily. Pt has a BP cuff but is unable to take BP himself.     CTN will follow up next week for updates--mkrw

## 2022-01-01 ENCOUNTER — HOSPITAL ENCOUNTER (EMERGENCY)
Age: 87
Discharge: HOME | End: 2022-02-26
Attending: EMERGENCY MEDICINE
Payer: MEDICARE

## 2022-01-01 ENCOUNTER — ANESTHESIA (OUTPATIENT)
Dept: CARDIAC CATH/INVASIVE PROCEDURES | Age: 87
End: 2022-01-01
Payer: MEDICARE

## 2022-01-01 ENCOUNTER — TRANSCRIBE ORDER (OUTPATIENT)
Dept: SCHEDULING | Age: 87
End: 2022-01-01

## 2022-01-01 ENCOUNTER — HOSPITAL ENCOUNTER (INPATIENT)
Age: 87
LOS: 2 days | Discharge: HOME | DRG: 291 | End: 2022-01-14
Attending: EMERGENCY MEDICINE | Admitting: FAMILY MEDICINE
Payer: MEDICARE

## 2022-01-01 ENCOUNTER — HOSPITAL ENCOUNTER (OUTPATIENT)
Age: 87
Setting detail: OUTPATIENT SURGERY
Discharge: HOME | End: 2022-01-18
Attending: INTERNAL MEDICINE | Admitting: INTERNAL MEDICINE
Payer: MEDICARE

## 2022-01-01 ENCOUNTER — PATIENT OUTREACH (OUTPATIENT)
Dept: CASE MANAGEMENT | Age: 87
End: 2022-01-01

## 2022-01-01 ENCOUNTER — APPOINTMENT (OUTPATIENT)
Dept: CT IMAGING | Age: 87
End: 2022-01-01
Attending: EMERGENCY MEDICINE
Payer: MEDICARE

## 2022-01-01 ENCOUNTER — APPOINTMENT (OUTPATIENT)
Dept: GENERAL RADIOLOGY | Age: 87
DRG: 291 | End: 2022-01-01
Attending: EMERGENCY MEDICINE
Payer: MEDICARE

## 2022-01-01 ENCOUNTER — ANESTHESIA EVENT (OUTPATIENT)
Dept: CARDIAC CATH/INVASIVE PROCEDURES | Age: 87
End: 2022-01-01
Payer: MEDICARE

## 2022-01-01 ENCOUNTER — HOSPITAL ENCOUNTER (OUTPATIENT)
Dept: PREADMISSION TESTING | Age: 87
Discharge: HOME | End: 2022-01-14

## 2022-01-01 ENCOUNTER — HOSPITAL ENCOUNTER (OUTPATIENT)
Dept: ULTRASOUND IMAGING | Age: 87
Discharge: HOME | End: 2022-01-25
Attending: INTERNAL MEDICINE
Payer: MEDICARE

## 2022-01-01 VITALS
OXYGEN SATURATION: 98 % | BODY MASS INDEX: 30.45 KG/M2 | HEIGHT: 67 IN | RESPIRATION RATE: 16 BRPM | HEART RATE: 81 BPM | SYSTOLIC BLOOD PRESSURE: 128 MMHG | DIASTOLIC BLOOD PRESSURE: 64 MMHG | TEMPERATURE: 97.6 F | WEIGHT: 194 LBS

## 2022-01-01 VITALS
WEIGHT: 200.62 LBS | RESPIRATION RATE: 17 BRPM | HEIGHT: 67 IN | TEMPERATURE: 98 F | SYSTOLIC BLOOD PRESSURE: 94 MMHG | DIASTOLIC BLOOD PRESSURE: 52 MMHG | HEART RATE: 82 BPM | OXYGEN SATURATION: 99 % | BODY MASS INDEX: 31.49 KG/M2

## 2022-01-01 VITALS
RESPIRATION RATE: 18 BRPM | SYSTOLIC BLOOD PRESSURE: 130 MMHG | OXYGEN SATURATION: 99 % | DIASTOLIC BLOOD PRESSURE: 91 MMHG | HEART RATE: 103 BPM | TEMPERATURE: 98.3 F

## 2022-01-01 DIAGNOSIS — Z45.010 PACEMAKER AT END OF BATTERY LIFE: ICD-10-CM

## 2022-01-01 DIAGNOSIS — R09.02 HYPOXIA: ICD-10-CM

## 2022-01-01 DIAGNOSIS — Z99.81 ON HOME OXYGEN THERAPY: ICD-10-CM

## 2022-01-01 DIAGNOSIS — I50.9 CONGESTIVE HEART FAILURE, UNSPECIFIED HF CHRONICITY, UNSPECIFIED HEART FAILURE TYPE (HCC): Primary | ICD-10-CM

## 2022-01-01 DIAGNOSIS — K62.89 RECTAL PAIN: Primary | ICD-10-CM

## 2022-01-01 DIAGNOSIS — N13.2 HYDRONEPHROSIS WITH RENAL AND URETERAL CALCULUS OBSTRUCTION: Primary | ICD-10-CM

## 2022-01-01 DIAGNOSIS — N17.9 ACUTE KIDNEY INJURY (NONTRAUMATIC) (HCC): ICD-10-CM

## 2022-01-01 DIAGNOSIS — N17.9 ACUTE KIDNEY INJURY (NONTRAUMATIC) (HCC): Primary | ICD-10-CM

## 2022-01-01 DIAGNOSIS — Z66 DNR (DO NOT RESUSCITATE): ICD-10-CM

## 2022-01-01 DIAGNOSIS — I50.9 CONGESTIVE HEART FAILURE, UNSPECIFIED HF CHRONICITY, UNSPECIFIED HEART FAILURE TYPE (HCC): ICD-10-CM

## 2022-01-01 LAB
ALBUMIN SERPL-MCNC: 3 G/DL (ref 3.5–5)
ALBUMIN SERPL-MCNC: 3.4 G/DL (ref 3.5–5)
ALBUMIN/GLOB SERPL: 0.7 {RATIO} (ref 1.1–2.2)
ALBUMIN/GLOB SERPL: 0.9 {RATIO} (ref 1.1–2.2)
ALP SERPL-CCNC: 85 U/L (ref 45–117)
ALP SERPL-CCNC: 98 U/L (ref 45–117)
ALT SERPL-CCNC: 30 U/L (ref 12–78)
ALT SERPL-CCNC: 37 U/L (ref 12–78)
ANION GAP SERPL CALC-SCNC: 11 MMOL/L (ref 5–15)
ANION GAP SERPL CALC-SCNC: 12 MMOL/L (ref 5–15)
ANION GAP SERPL CALC-SCNC: 6 MMOL/L (ref 5–15)
ANION GAP SERPL CALC-SCNC: 7 MMOL/L (ref 5–15)
APPEARANCE UR: ABNORMAL
AST SERPL-CCNC: 22 U/L (ref 15–37)
AST SERPL-CCNC: 30 U/L (ref 15–37)
ATRIAL RATE: 77 BPM
ATRIAL RATE: 89 BPM
BACTERIA URNS QL MICRO: NEGATIVE /HPF
BASOPHILS # BLD: 0 K/UL (ref 0–0.1)
BASOPHILS NFR BLD: 0 % (ref 0–1)
BASOPHILS NFR BLD: 0 % (ref 0–1)
BASOPHILS NFR BLD: 1 % (ref 0–1)
BILIRUB SERPL-MCNC: 0.5 MG/DL (ref 0.2–1)
BILIRUB SERPL-MCNC: 0.7 MG/DL (ref 0.2–1)
BILIRUB UR QL: NEGATIVE
BNP SERPL-MCNC: ABNORMAL PG/ML (ref 0–450)
BNP SERPL-MCNC: ABNORMAL PG/ML (ref 0–450)
BUN SERPL-MCNC: 100 MG/DL (ref 6–20)
BUN SERPL-MCNC: 72 MG/DL (ref 6–20)
BUN SERPL-MCNC: 75 MG/DL (ref 6–20)
BUN SERPL-MCNC: 76 MG/DL (ref 6–20)
BUN/CREAT SERPL: 20 (ref 12–20)
BUN/CREAT SERPL: 21 (ref 12–20)
BUN/CREAT SERPL: 22 (ref 12–20)
BUN/CREAT SERPL: 27 (ref 12–20)
CALCIUM SERPL-MCNC: 8.6 MG/DL (ref 8.5–10.1)
CALCIUM SERPL-MCNC: 8.7 MG/DL (ref 8.5–10.1)
CALCIUM SERPL-MCNC: 9 MG/DL (ref 8.5–10.1)
CALCIUM SERPL-MCNC: 9.3 MG/DL (ref 8.5–10.1)
CALCULATED R AXIS, ECG10: -89 DEGREES
CALCULATED R AXIS, ECG10: -97 DEGREES
CALCULATED T AXIS, ECG11: 72 DEGREES
CALCULATED T AXIS, ECG11: 75 DEGREES
CHLORIDE SERPL-SCNC: 103 MMOL/L (ref 97–108)
CHLORIDE SERPL-SCNC: 108 MMOL/L (ref 97–108)
CHLORIDE SERPL-SCNC: 108 MMOL/L (ref 97–108)
CHLORIDE SERPL-SCNC: 96 MMOL/L (ref 97–108)
CO2 SERPL-SCNC: 25 MMOL/L (ref 21–32)
CO2 SERPL-SCNC: 25 MMOL/L (ref 21–32)
CO2 SERPL-SCNC: 26 MMOL/L (ref 21–32)
CO2 SERPL-SCNC: 30 MMOL/L (ref 21–32)
COLOR UR: ABNORMAL
CREAT SERPL-MCNC: 3.48 MG/DL (ref 0.7–1.3)
CREAT SERPL-MCNC: 3.55 MG/DL (ref 0.7–1.3)
CREAT SERPL-MCNC: 3.64 MG/DL (ref 0.7–1.3)
CREAT SERPL-MCNC: 3.73 MG/DL (ref 0.7–1.3)
DIAGNOSIS, 93000: NORMAL
DIAGNOSIS, 93000: NORMAL
DIFFERENTIAL METHOD BLD: ABNORMAL
EOSINOPHIL # BLD: 0.1 K/UL (ref 0–0.4)
EOSINOPHIL NFR BLD: 1 % (ref 0–7)
EOSINOPHIL NFR BLD: 1 % (ref 0–7)
EOSINOPHIL NFR BLD: 2 % (ref 0–7)
EPITH CASTS URNS QL MICRO: ABNORMAL /LPF
ERYTHROCYTE [DISTWIDTH] IN BLOOD BY AUTOMATED COUNT: 14.3 % (ref 11.5–14.5)
ERYTHROCYTE [DISTWIDTH] IN BLOOD BY AUTOMATED COUNT: 14.4 % (ref 11.5–14.5)
ERYTHROCYTE [DISTWIDTH] IN BLOOD BY AUTOMATED COUNT: 14.6 % (ref 11.5–14.5)
ERYTHROCYTE [DISTWIDTH] IN BLOOD BY AUTOMATED COUNT: 14.8 % (ref 11.5–14.5)
GLOBULIN SER CALC-MCNC: 3.7 G/DL (ref 2–4)
GLOBULIN SER CALC-MCNC: 4.2 G/DL (ref 2–4)
GLUCOSE BLD STRIP.AUTO-MCNC: 113 MG/DL (ref 65–117)
GLUCOSE BLD STRIP.AUTO-MCNC: 171 MG/DL (ref 65–117)
GLUCOSE SERPL-MCNC: 105 MG/DL (ref 65–100)
GLUCOSE SERPL-MCNC: 129 MG/DL (ref 65–100)
GLUCOSE SERPL-MCNC: 131 MG/DL (ref 65–100)
GLUCOSE SERPL-MCNC: 176 MG/DL (ref 65–100)
GLUCOSE UR STRIP.AUTO-MCNC: NEGATIVE MG/DL
HCT VFR BLD AUTO: 30.4 % (ref 36.6–50.3)
HCT VFR BLD AUTO: 30.9 % (ref 36.6–50.3)
HCT VFR BLD AUTO: 32.5 % (ref 36.6–50.3)
HCT VFR BLD AUTO: 33.2 % (ref 36.6–50.3)
HEMOCCULT STL QL: NEGATIVE
HGB BLD-MCNC: 10 G/DL (ref 12.1–17)
HGB BLD-MCNC: 10.1 G/DL (ref 12.1–17)
HGB BLD-MCNC: 9.3 G/DL (ref 12.1–17)
HGB BLD-MCNC: 9.7 G/DL (ref 12.1–17)
HGB UR QL STRIP: ABNORMAL
IMM GRANULOCYTES # BLD AUTO: 0 K/UL (ref 0–0.04)
IMM GRANULOCYTES # BLD AUTO: 0.1 K/UL (ref 0–0.04)
IMM GRANULOCYTES # BLD AUTO: 0.1 K/UL (ref 0–0.04)
IMM GRANULOCYTES NFR BLD AUTO: 1 % (ref 0–0.5)
KETONES UR QL STRIP.AUTO: NEGATIVE MG/DL
LACTATE SERPL-SCNC: 1.6 MMOL/L (ref 0.4–2)
LEUKOCYTE ESTERASE UR QL STRIP.AUTO: NEGATIVE
LIPASE SERPL-CCNC: 513 U/L (ref 73–393)
LYMPHOCYTES # BLD: 1.3 K/UL (ref 0.8–3.5)
LYMPHOCYTES # BLD: 1.3 K/UL (ref 0.8–3.5)
LYMPHOCYTES # BLD: 1.5 K/UL (ref 0.8–3.5)
LYMPHOCYTES NFR BLD: 17 % (ref 12–49)
LYMPHOCYTES NFR BLD: 19 % (ref 12–49)
LYMPHOCYTES NFR BLD: 20 % (ref 12–49)
MAGNESIUM SERPL-MCNC: 2 MG/DL (ref 1.6–2.4)
MAGNESIUM SERPL-MCNC: 2.4 MG/DL (ref 1.6–2.4)
MCH RBC QN AUTO: 30.2 PG (ref 26–34)
MCH RBC QN AUTO: 30.2 PG (ref 26–34)
MCH RBC QN AUTO: 30.3 PG (ref 26–34)
MCH RBC QN AUTO: 30.6 PG (ref 26–34)
MCHC RBC AUTO-ENTMCNC: 30.1 G/DL (ref 30–36.5)
MCHC RBC AUTO-ENTMCNC: 30.4 G/DL (ref 30–36.5)
MCHC RBC AUTO-ENTMCNC: 30.8 G/DL (ref 30–36.5)
MCHC RBC AUTO-ENTMCNC: 31.9 G/DL (ref 30–36.5)
MCV RBC AUTO: 100.3 FL (ref 80–99)
MCV RBC AUTO: 100.6 FL (ref 80–99)
MCV RBC AUTO: 95 FL (ref 80–99)
MCV RBC AUTO: 98.2 FL (ref 80–99)
MONOCYTES # BLD: 0.4 K/UL (ref 0–1)
MONOCYTES # BLD: 0.5 K/UL (ref 0–1)
MONOCYTES # BLD: 0.6 K/UL (ref 0–1)
MONOCYTES NFR BLD: 6 % (ref 5–13)
MONOCYTES NFR BLD: 7 % (ref 5–13)
MONOCYTES NFR BLD: 8 % (ref 5–13)
NEUTS SEG # BLD: 4.8 K/UL (ref 1.8–8)
NEUTS SEG # BLD: 5.1 K/UL (ref 1.8–8)
NEUTS SEG # BLD: 5.5 K/UL (ref 1.8–8)
NEUTS SEG NFR BLD: 69 % (ref 32–75)
NEUTS SEG NFR BLD: 72 % (ref 32–75)
NEUTS SEG NFR BLD: 74 % (ref 32–75)
NITRITE UR QL STRIP.AUTO: NEGATIVE
NRBC # BLD: 0 K/UL (ref 0–0.01)
NRBC BLD-RTO: 0 PER 100 WBC
PH UR STRIP: 6 [PH] (ref 5–8)
PHOSPHATE SERPL-MCNC: 5.2 MG/DL (ref 2.6–4.7)
PLATELET # BLD AUTO: 121 K/UL (ref 150–400)
PLATELET # BLD AUTO: 123 K/UL (ref 150–400)
PLATELET # BLD AUTO: 136 K/UL (ref 150–400)
PLATELET # BLD AUTO: 176 K/UL (ref 150–400)
PMV BLD AUTO: 9.5 FL (ref 8.9–12.9)
PMV BLD AUTO: 9.6 FL (ref 8.9–12.9)
PMV BLD AUTO: 9.6 FL (ref 8.9–12.9)
PMV BLD AUTO: 9.7 FL (ref 8.9–12.9)
POTASSIUM SERPL-SCNC: 3.9 MMOL/L (ref 3.5–5.1)
POTASSIUM SERPL-SCNC: 4.4 MMOL/L (ref 3.5–5.1)
PROT SERPL-MCNC: 7.1 G/DL (ref 6.4–8.2)
PROT SERPL-MCNC: 7.2 G/DL (ref 6.4–8.2)
PROT UR STRIP-MCNC: 100 MG/DL
Q-T INTERVAL, ECG07: 428 MS
Q-T INTERVAL, ECG07: 448 MS
QRS DURATION, ECG06: 158 MS
QRS DURATION, ECG06: 164 MS
QTC CALCULATION (BEZET), ECG08: 529 MS
QTC CALCULATION (BEZET), ECG08: 537 MS
RBC # BLD AUTO: 3.08 M/UL (ref 4.1–5.7)
RBC # BLD AUTO: 3.2 M/UL (ref 4.1–5.7)
RBC # BLD AUTO: 3.3 M/UL (ref 4.1–5.7)
RBC # BLD AUTO: 3.31 M/UL (ref 4.1–5.7)
RBC #/AREA URNS HPF: ABNORMAL /HPF (ref 0–5)
RBC MORPH BLD: ABNORMAL
SARS-COV-2, COV2: NORMAL
SARS-COV-2, XPLCVT: NOT DETECTED
SERVICE CMNT-IMP: ABNORMAL
SERVICE CMNT-IMP: NORMAL
SODIUM SERPL-SCNC: 137 MMOL/L (ref 136–145)
SODIUM SERPL-SCNC: 140 MMOL/L (ref 136–145)
SOURCE, COVRS: NORMAL
SP GR UR REFRACTOMETRY: 1.01 (ref 1–1.03)
TROPONIN-HIGH SENSITIVITY: 40 NG/L (ref 0–76)
TROPONIN-HIGH SENSITIVITY: 48 NG/L (ref 0–76)
UROBILINOGEN UR QL STRIP.AUTO: 0.2 EU/DL (ref 0.2–1)
VENTRICULAR RATE, ECG03: 84 BPM
VENTRICULAR RATE, ECG03: 95 BPM
WBC # BLD AUTO: 6.4 K/UL (ref 4.1–11.1)
WBC # BLD AUTO: 6.7 K/UL (ref 4.1–11.1)
WBC # BLD AUTO: 7.3 K/UL (ref 4.1–11.1)
WBC # BLD AUTO: 7.4 K/UL (ref 4.1–11.1)
WBC URNS QL MICRO: ABNORMAL /HPF (ref 0–4)

## 2022-01-01 PROCEDURE — 74011250636 HC RX REV CODE- 250/636: Performed by: NURSE ANESTHETIST, CERTIFIED REGISTERED

## 2022-01-01 PROCEDURE — 84100 ASSAY OF PHOSPHORUS: CPT

## 2022-01-01 PROCEDURE — 36415 COLL VENOUS BLD VENIPUNCTURE: CPT

## 2022-01-01 PROCEDURE — 65660000000 HC RM CCU STEPDOWN

## 2022-01-01 PROCEDURE — 80053 COMPREHEN METABOLIC PANEL: CPT

## 2022-01-01 PROCEDURE — 85025 COMPLETE CBC W/AUTO DIFF WBC: CPT

## 2022-01-01 PROCEDURE — 77030037400 HC ADH TISS HI VISC EXOFIN CHMP -B: Performed by: INTERNAL MEDICINE

## 2022-01-01 PROCEDURE — 74011000258 HC RX REV CODE- 258: Performed by: NURSE ANESTHETIST, CERTIFIED REGISTERED

## 2022-01-01 PROCEDURE — 82272 OCCULT BLD FECES 1-3 TESTS: CPT

## 2022-01-01 PROCEDURE — C1882 AICD, OTHER THAN SING/DUAL: HCPCS | Performed by: INTERNAL MEDICINE

## 2022-01-01 PROCEDURE — 83605 ASSAY OF LACTIC ACID: CPT

## 2022-01-01 PROCEDURE — 74011250636 HC RX REV CODE- 250/636: Performed by: EMERGENCY MEDICINE

## 2022-01-01 PROCEDURE — U0005 INFEC AGEN DETEC AMPLI PROBE: HCPCS

## 2022-01-01 PROCEDURE — 97161 PT EVAL LOW COMPLEX 20 MIN: CPT

## 2022-01-01 PROCEDURE — 71045 X-RAY EXAM CHEST 1 VIEW: CPT

## 2022-01-01 PROCEDURE — 2709999900 HC NON-CHARGEABLE SUPPLY: Performed by: INTERNAL MEDICINE

## 2022-01-01 PROCEDURE — 99284 EMERGENCY DEPT VISIT MOD MDM: CPT

## 2022-01-01 PROCEDURE — 77030031139 HC SUT VCRL2 J&J -A: Performed by: INTERNAL MEDICINE

## 2022-01-01 PROCEDURE — 94760 N-INVAS EAR/PLS OXIMETRY 1: CPT

## 2022-01-01 PROCEDURE — 99285 EMERGENCY DEPT VISIT HI MDM: CPT

## 2022-01-01 PROCEDURE — 77030018729 HC ELECTRD DEFIB PAD CARD -B: Performed by: INTERNAL MEDICINE

## 2022-01-01 PROCEDURE — 74011000250 HC RX REV CODE- 250: Performed by: FAMILY MEDICINE

## 2022-01-01 PROCEDURE — 83880 ASSAY OF NATRIURETIC PEPTIDE: CPT

## 2022-01-01 PROCEDURE — 94640 AIRWAY INHALATION TREATMENT: CPT

## 2022-01-01 PROCEDURE — 80048 BASIC METABOLIC PNL TOTAL CA: CPT

## 2022-01-01 PROCEDURE — 97116 GAIT TRAINING THERAPY: CPT

## 2022-01-01 PROCEDURE — 74011000250 HC RX REV CODE- 250: Performed by: EMERGENCY MEDICINE

## 2022-01-01 PROCEDURE — 82962 GLUCOSE BLOOD TEST: CPT

## 2022-01-01 PROCEDURE — 84484 ASSAY OF TROPONIN QUANT: CPT

## 2022-01-01 PROCEDURE — 76770 US EXAM ABDO BACK WALL COMP: CPT

## 2022-01-01 PROCEDURE — C1781 MESH (IMPLANTABLE): HCPCS | Performed by: INTERNAL MEDICINE

## 2022-01-01 PROCEDURE — 93005 ELECTROCARDIOGRAM TRACING: CPT

## 2022-01-01 PROCEDURE — 74176 CT ABD & PELVIS W/O CONTRAST: CPT

## 2022-01-01 PROCEDURE — 74011000250 HC RX REV CODE- 250: Performed by: NURSE ANESTHETIST, CERTIFIED REGISTERED

## 2022-01-01 PROCEDURE — 74011000272 HC RX REV CODE- 272: Performed by: INTERNAL MEDICINE

## 2022-01-01 PROCEDURE — 51798 US URINE CAPACITY MEASURE: CPT

## 2022-01-01 PROCEDURE — 74011250637 HC RX REV CODE- 250/637: Performed by: FAMILY MEDICINE

## 2022-01-01 PROCEDURE — 74011000250 HC RX REV CODE- 250: Performed by: INTERNAL MEDICINE

## 2022-01-01 PROCEDURE — 74011250636 HC RX REV CODE- 250/636: Performed by: INTERNAL MEDICINE

## 2022-01-01 PROCEDURE — 97535 SELF CARE MNGMENT TRAINING: CPT

## 2022-01-01 PROCEDURE — 83690 ASSAY OF LIPASE: CPT

## 2022-01-01 PROCEDURE — 77030028698 HC BLD TISS PLSM MEDT -D: Performed by: INTERNAL MEDICINE

## 2022-01-01 PROCEDURE — 85027 COMPLETE CBC AUTOMATED: CPT

## 2022-01-01 PROCEDURE — 96374 THER/PROPH/DIAG INJ IV PUSH: CPT

## 2022-01-01 PROCEDURE — 76060000032 HC ANESTHESIA 0.5 TO 1 HR: Performed by: INTERNAL MEDICINE

## 2022-01-01 PROCEDURE — 83735 ASSAY OF MAGNESIUM: CPT

## 2022-01-01 PROCEDURE — 77030002996 HC SUT SLK J&J -A: Performed by: INTERNAL MEDICINE

## 2022-01-01 PROCEDURE — 74011000636 HC RX REV CODE- 636: Performed by: EMERGENCY MEDICINE

## 2022-01-01 PROCEDURE — 97165 OT EVAL LOW COMPLEX 30 MIN: CPT

## 2022-01-01 PROCEDURE — 33264 RMVL & RPLCMT DFB GEN MLT LD: CPT | Performed by: INTERNAL MEDICINE

## 2022-01-01 PROCEDURE — 81001 URINALYSIS AUTO W/SCOPE: CPT

## 2022-01-01 DEVICE — ENVELOPE CMRM6133 ABSORB LRG MR
Type: IMPLANTABLE DEVICE | Status: FUNCTIONAL
Brand: TYRX™

## 2022-01-01 DEVICE — CRTD DTMA1QQ CLARIA MRI QUAD US DF4
Type: IMPLANTABLE DEVICE | Status: FUNCTIONAL
Brand: CLARIA MRI™ QUAD CRT-D SURESCAN™

## 2022-01-01 RX ORDER — BUMETANIDE 1 MG/1
2 TABLET ORAL DAILY
Status: DISCONTINUED | OUTPATIENT
Start: 2022-01-01 | End: 2022-01-01

## 2022-01-01 RX ORDER — FUROSEMIDE 10 MG/ML
40 INJECTION INTRAMUSCULAR; INTRAVENOUS
Status: COMPLETED | OUTPATIENT
Start: 2022-01-01 | End: 2022-01-01

## 2022-01-01 RX ORDER — FLUTICASONE FUROATE, UMECLIDINIUM BROMIDE AND VILANTEROL TRIFENATATE 100; 62.5; 25 UG/1; UG/1; UG/1
1 POWDER RESPIRATORY (INHALATION) DAILY
COMMUNITY

## 2022-01-01 RX ORDER — TAMSULOSIN HYDROCHLORIDE 0.4 MG/1
0.4 CAPSULE ORAL DAILY
Status: DISCONTINUED | OUTPATIENT
Start: 2022-01-01 | End: 2022-01-01 | Stop reason: HOSPADM

## 2022-01-01 RX ORDER — CEFAZOLIN SODIUM/WATER 2 G/20 ML
SYRINGE (ML) INTRAVENOUS AS NEEDED
Status: DISCONTINUED | OUTPATIENT
Start: 2022-01-01 | End: 2022-01-01 | Stop reason: HOSPADM

## 2022-01-01 RX ORDER — LIDOCAINE HYDROCHLORIDE AND EPINEPHRINE 10; 10 MG/ML; UG/ML
INJECTION, SOLUTION INFILTRATION; PERINEURAL AS NEEDED
Status: DISCONTINUED | OUTPATIENT
Start: 2022-01-01 | End: 2022-01-01 | Stop reason: HOSPADM

## 2022-01-01 RX ORDER — PROPOFOL 10 MG/ML
INJECTION, EMULSION INTRAVENOUS AS NEEDED
Status: DISCONTINUED | OUTPATIENT
Start: 2022-01-01 | End: 2022-01-01 | Stop reason: HOSPADM

## 2022-01-01 RX ORDER — PROPOFOL 10 MG/ML
INJECTION, EMULSION INTRAVENOUS
Status: DISCONTINUED | OUTPATIENT
Start: 2022-01-01 | End: 2022-01-01 | Stop reason: HOSPADM

## 2022-01-01 RX ORDER — ACETAMINOPHEN 650 MG/1
650 SUPPOSITORY RECTAL
Status: DISCONTINUED | OUTPATIENT
Start: 2022-01-01 | End: 2022-01-01 | Stop reason: HOSPADM

## 2022-01-01 RX ORDER — SODIUM CHLORIDE 9 MG/ML
INJECTION, SOLUTION INTRAVENOUS
Status: DISCONTINUED | OUTPATIENT
Start: 2022-01-01 | End: 2022-01-01 | Stop reason: HOSPADM

## 2022-01-01 RX ORDER — BUMETANIDE 0.25 MG/ML
1 INJECTION INTRAMUSCULAR; INTRAVENOUS
Status: COMPLETED | OUTPATIENT
Start: 2022-01-01 | End: 2022-01-01

## 2022-01-01 RX ORDER — LEVOTHYROXINE SODIUM 150 UG/1
150 TABLET ORAL
Status: DISCONTINUED | OUTPATIENT
Start: 2022-01-01 | End: 2022-01-01 | Stop reason: HOSPADM

## 2022-01-01 RX ORDER — BUMETANIDE 1 MG/1
2 TABLET ORAL DAILY
Qty: 60 TABLET | Refills: 0 | Status: SHIPPED | OUTPATIENT
Start: 2022-01-01 | End: 2022-01-01

## 2022-01-01 RX ORDER — ONDANSETRON 2 MG/ML
INJECTION INTRAMUSCULAR; INTRAVENOUS AS NEEDED
Status: DISCONTINUED | OUTPATIENT
Start: 2022-01-01 | End: 2022-01-01 | Stop reason: HOSPADM

## 2022-01-01 RX ORDER — ONDANSETRON 4 MG/1
4 TABLET, ORALLY DISINTEGRATING ORAL
Status: DISCONTINUED | OUTPATIENT
Start: 2022-01-01 | End: 2022-01-01 | Stop reason: HOSPADM

## 2022-01-01 RX ORDER — ACETAMINOPHEN 325 MG/1
650 TABLET ORAL
Status: DISCONTINUED | OUTPATIENT
Start: 2022-01-01 | End: 2022-01-01 | Stop reason: HOSPADM

## 2022-01-01 RX ORDER — METOPROLOL SUCCINATE 25 MG/1
25 TABLET, EXTENDED RELEASE ORAL
Status: DISCONTINUED | OUTPATIENT
Start: 2022-01-01 | End: 2022-01-01 | Stop reason: HOSPADM

## 2022-01-01 RX ORDER — FENTANYL CITRATE 50 UG/ML
INJECTION, SOLUTION INTRAMUSCULAR; INTRAVENOUS AS NEEDED
Status: DISCONTINUED | OUTPATIENT
Start: 2022-01-01 | End: 2022-01-01 | Stop reason: HOSPADM

## 2022-01-01 RX ORDER — BUDESONIDE 0.5 MG/2ML
500 INHALANT ORAL
Status: DISCONTINUED | OUTPATIENT
Start: 2022-01-01 | End: 2022-01-01 | Stop reason: HOSPADM

## 2022-01-01 RX ORDER — BUMETANIDE 0.25 MG/ML
2 INJECTION INTRAMUSCULAR; INTRAVENOUS EVERY 12 HOURS
Status: DISCONTINUED | OUTPATIENT
Start: 2022-01-01 | End: 2022-01-01 | Stop reason: HOSPADM

## 2022-01-01 RX ORDER — BUMETANIDE 1 MG/1
1 TABLET ORAL DAILY
Qty: 30 TABLET | Refills: 0 | Status: SHIPPED | OUTPATIENT
Start: 2022-01-01 | End: 2022-01-01

## 2022-01-01 RX ORDER — CYANOCOBALAMIN (VITAMIN B-12) 500 MCG
2000 TABLET ORAL DAILY
Status: DISCONTINUED | OUTPATIENT
Start: 2022-01-01 | End: 2022-01-01 | Stop reason: HOSPADM

## 2022-01-01 RX ORDER — ARFORMOTEROL TARTRATE 15 UG/2ML
15 SOLUTION RESPIRATORY (INHALATION)
Status: DISCONTINUED | OUTPATIENT
Start: 2022-01-01 | End: 2022-01-01 | Stop reason: HOSPADM

## 2022-01-01 RX ORDER — EZETIMIBE 10 MG/1
10 TABLET ORAL DAILY
Status: DISCONTINUED | OUTPATIENT
Start: 2022-01-01 | End: 2022-01-01 | Stop reason: HOSPADM

## 2022-01-01 RX ORDER — ONDANSETRON 2 MG/ML
4 INJECTION INTRAMUSCULAR; INTRAVENOUS
Status: DISCONTINUED | OUTPATIENT
Start: 2022-01-01 | End: 2022-01-01 | Stop reason: HOSPADM

## 2022-01-01 RX ORDER — ESCITALOPRAM OXALATE 10 MG/1
20 TABLET ORAL DAILY
Status: DISCONTINUED | OUTPATIENT
Start: 2022-01-01 | End: 2022-01-01 | Stop reason: HOSPADM

## 2022-01-01 RX ORDER — SODIUM CHLORIDE 0.9 % (FLUSH) 0.9 %
5-40 SYRINGE (ML) INJECTION AS NEEDED
Status: DISCONTINUED | OUTPATIENT
Start: 2022-01-01 | End: 2022-01-01 | Stop reason: HOSPADM

## 2022-01-01 RX ORDER — SODIUM CHLORIDE 0.9 % (FLUSH) 0.9 %
5-40 SYRINGE (ML) INJECTION EVERY 8 HOURS
Status: DISCONTINUED | OUTPATIENT
Start: 2022-01-01 | End: 2022-01-01 | Stop reason: HOSPADM

## 2022-01-01 RX ORDER — MONTELUKAST SODIUM 4 MG/1
1 TABLET, CHEWABLE ORAL 2 TIMES DAILY
Status: DISCONTINUED | OUTPATIENT
Start: 2022-01-01 | End: 2022-01-01 | Stop reason: HOSPADM

## 2022-01-01 RX ADMIN — BUMETANIDE 2 MG: 0.25 INJECTION INTRAMUSCULAR; INTRAVENOUS at 22:08

## 2022-01-01 RX ADMIN — FENTANYL CITRATE 12.5 MCG: 50 INJECTION, SOLUTION INTRAMUSCULAR; INTRAVENOUS at 14:33

## 2022-01-01 RX ADMIN — Medication 10 ML: at 06:45

## 2022-01-01 RX ADMIN — SODIUM CHLORIDE 1000 ML: 9 INJECTION, SOLUTION INTRAVENOUS at 18:22

## 2022-01-01 RX ADMIN — PROPOFOL 60 MCG/KG/MIN: 10 INJECTION, EMULSION INTRAVENOUS at 14:10

## 2022-01-01 RX ADMIN — DEXMEDETOMIDINE HYDROCHLORIDE 2 MCG: 100 INJECTION, SOLUTION, CONCENTRATE INTRAVENOUS at 14:32

## 2022-01-01 RX ADMIN — Medication 10 ML: at 14:00

## 2022-01-01 RX ADMIN — DEXMEDETOMIDINE HYDROCHLORIDE 4 MCG: 100 INJECTION, SOLUTION, CONCENTRATE INTRAVENOUS at 14:10

## 2022-01-01 RX ADMIN — LEVOTHYROXINE SODIUM 150 MCG: 0.15 TABLET ORAL at 09:04

## 2022-01-01 RX ADMIN — Medication 2 G: at 14:08

## 2022-01-01 RX ADMIN — METOPROLOL SUCCINATE 25 MG: 25 TABLET, FILM COATED, EXTENDED RELEASE ORAL at 09:04

## 2022-01-01 RX ADMIN — FUROSEMIDE 40 MG: 40 INJECTION, SOLUTION INTRAMUSCULAR; INTRAVENOUS at 22:53

## 2022-01-01 RX ADMIN — FENTANYL CITRATE 12.5 MCG: 50 INJECTION, SOLUTION INTRAMUSCULAR; INTRAVENOUS at 14:15

## 2022-01-01 RX ADMIN — ESCITALOPRAM 20 MG: 10 TABLET, FILM COATED ORAL at 10:53

## 2022-01-01 RX ADMIN — CHOLECALCIFEROL (VITAMIN D3) 10 MCG (400 UNIT) TABLET 2000 UNITS: at 12:23

## 2022-01-01 RX ADMIN — ONDANSETRON HYDROCHLORIDE 4 MG: 2 INJECTION, SOLUTION INTRAMUSCULAR; INTRAVENOUS at 14:53

## 2022-01-01 RX ADMIN — PHENYLEPHRINE HYDROCHLORIDE 40 MCG/MIN: 10 INJECTION INTRAVENOUS at 14:17

## 2022-01-01 RX ADMIN — PROPOFOL 10 MG: 10 INJECTION, EMULSION INTRAVENOUS at 14:30

## 2022-01-01 RX ADMIN — ESCITALOPRAM 20 MG: 10 TABLET, FILM COATED ORAL at 09:05

## 2022-01-01 RX ADMIN — LEVOTHYROXINE SODIUM 150 MCG: 0.15 TABLET ORAL at 06:45

## 2022-01-01 RX ADMIN — BUMETANIDE 1 MG: 0.25 INJECTION, SOLUTION INTRAMUSCULAR; INTRAVENOUS at 20:06

## 2022-01-01 RX ADMIN — TAMSULOSIN HYDROCHLORIDE 0.4 MG: 0.4 CAPSULE ORAL at 09:03

## 2022-01-01 RX ADMIN — PROPOFOL 20 MG: 10 INJECTION, EMULSION INTRAVENOUS at 14:10

## 2022-01-01 RX ADMIN — TAMSULOSIN HYDROCHLORIDE 0.4 MG: 0.4 CAPSULE ORAL at 10:53

## 2022-01-01 RX ADMIN — BUDESONIDE 500 MCG: 0.5 INHALANT RESPIRATORY (INHALATION) at 08:15

## 2022-01-01 RX ADMIN — COLESTIPOL HYDROCHLORIDE 1 G: 1 TABLET, FILM COATED ORAL at 18:00

## 2022-01-01 RX ADMIN — ARFORMOTEROL TARTRATE 15 MCG: 15 SOLUTION RESPIRATORY (INHALATION) at 08:15

## 2022-01-01 RX ADMIN — CHOLECALCIFEROL (VITAMIN D3) 10 MCG (400 UNIT) TABLET 2000 UNITS: at 10:53

## 2022-01-01 RX ADMIN — METOPROLOL SUCCINATE 25 MG: 25 TABLET, FILM COATED, EXTENDED RELEASE ORAL at 22:08

## 2022-01-01 RX ADMIN — FENTANYL CITRATE 12.5 MCG: 50 INJECTION, SOLUTION INTRAMUSCULAR; INTRAVENOUS at 14:10

## 2022-01-01 RX ADMIN — DIATRIZOATE MEGLUMINE AND DIATRIZOATE SODIUM 30 ML: 660; 100 LIQUID ORAL; RECTAL at 19:22

## 2022-01-01 RX ADMIN — BUMETANIDE 2 MG: 0.25 INJECTION INTRAMUSCULAR; INTRAVENOUS at 10:56

## 2022-01-01 RX ADMIN — EZETIMIBE 10 MG: 10 TABLET ORAL at 10:53

## 2022-01-01 RX ADMIN — COLESTIPOL HYDROCHLORIDE 1 G: 1 TABLET, FILM COATED ORAL at 12:22

## 2022-01-01 RX ADMIN — EZETIMIBE 10 MG: 10 TABLET ORAL at 09:06

## 2022-01-01 RX ADMIN — Medication 10 ML: at 06:00

## 2022-01-01 RX ADMIN — FENTANYL CITRATE 12.5 MCG: 50 INJECTION, SOLUTION INTRAMUSCULAR; INTRAVENOUS at 14:53

## 2022-01-01 RX ADMIN — PROPOFOL 10 MG: 10 INJECTION, EMULSION INTRAVENOUS at 14:15

## 2022-01-01 RX ADMIN — Medication 10 ML: at 22:08

## 2022-01-01 RX ADMIN — SODIUM CHLORIDE: 9 INJECTION, SOLUTION INTRAVENOUS at 14:00

## 2022-01-01 RX ADMIN — Medication 10 ML: at 14:41

## 2022-01-01 RX ADMIN — BUMETANIDE 2 MG: 0.25 INJECTION INTRAMUSCULAR; INTRAVENOUS at 09:07

## 2022-01-12 NOTE — ED PROVIDER NOTES
Date of Service:  1/12/2022    Patient:  Tamera Blancas Chief Complaint:  Shortness of Breath       HPI:  Tamera Blancas is a 82 y.o.  male who presents for evaluation of shortness of breath. Patient has an extensive cardiac history including CHF, pacemaker due for replacement. He also had a lobectomy in the past. Recent admission for CHF exacerbation with change in medicine. Over the last 2 days, patient feels that he is becoming more short of breath. He was found to be hypoxic for EMS, this is resolved with 2 L nasal cannula. Present patient states that he feels somewhat better but still describes some shortness of breath at baseline with worse with exertional symptoms. He denies other acute complaint           Past Medical History:   Diagnosis Date    Arrhythmia     AFib    Arthritis     Atrial fibrillation ([address]     Cancer ([address]     right lung, skin cancer, bladder (instilled medication in bladder for tx)    Chronic kidney disease     Diabetes ([address]     NIDDM    Gout     Hypertension     Hypothyroidism     Ill-defined condition     neuropathy in feet    Other ill-defined conditions(799.89)     high cholesterol    Pacemaker     Psychiatric disorder     depression    Sleep apnea     uses CPAP       Past Surgical History:   Procedure Laterality Date    COLONOSCOPY N/A 8/8/2017    COLONOSCOPY performed by Sheeba [facility]in MD at [facility]    COLONOSCOPY N/A 2/22/2019    COLONOSCOPY performed by Fidelia Matta MD at [facility] ENDOSCOPY    ECHO 2D ADULT  9/2009    LVH, LAE, mild MR, EF 55-60%    HX CATARACT REMOVAL Bilateral     with lens implants    HX HEART CATHETERIZATION  2009    HX HEENT      tonsillectomy    HX KNEE ARTHROSCOPY Right     menisectomy    HX LUMBAR LAMINECTOMY      back surgery / hardware    HX OTHER SURGICAL  4/28/14    BRONCHOSCOPY/RIGHT VIDEO ASSISTED THORACOSCOPY,  2.RIGHT UPPER LOBE WEDGE RESECTION 3. COMPLETION RIGHT UPPER LOBECTOMY 4.  MEDIASTINAL LYMPH NODE DISSECTION 5. INTERCOSTAL BLOCK    HX OTHER SURGICAL  14    Right Thoracoscopy, Ligation of inferior pulmonary ligament bleeding with prolene/clips    HX PACEMAKER Left     AICD--Dr. Kary Canela    HX UROLOGICAL      bladder tumor    OR CARDIAC SURG PROCEDURE UNLIST      ablation,  Pacer/defibralator    STRESS TEST CARDIOLITE  2010    exercise/lexiscan study - poor exercise capacity, normal perfusion, EF 62%    THORACOSCOPY SURG WEDG RESEC LUNG  2015    RUL         Family History:   Problem Relation Age of Onset    Cancer Mother         pancreatic    Cancer Father         colon    Cancer Sister         uterine    Cancer Brother         lung, bladder       Social History     Socioeconomic History    Marital status:      Spouse name: Not on file    Number of children: Not on file    Years of education: Not on file    Highest education level: Not on file   Occupational History    Not on file   Tobacco Use    Smoking status: Former Smoker     Packs/day: 2.50     Years: 25.00     Pack years: 62.50     Types: Cigarettes     Quit date: 1975     Years since quittin.6    Smokeless tobacco: Never Used    Tobacco comment: quit    Substance and Sexual Activity    Alcohol use: No     Alcohol/week: 0.0 standard drinks    Drug use: No    Sexual activity: Not on file   Other Topics Concern    Not on file   Social History Narrative    Not on file     Social Determinants of Health     Financial Resource Strain:     Difficulty of Paying Living Expenses: Not on file   Food Insecurity:     Worried About [address] in the Last Year: Not on file    [address] in the Last Year: Not on file   Transportation Needs:     Lack of Transportation (Medical): Not on file    Lack of Transportation (Non-Medical):  Not on file   Physical Activity:     Days of Exercise per Week: Not on file    Minutes of Exercise per Session: Not on file   Stress:     Feeling of Stress : Not on file   Social Connections:     Frequency of Communication with Friends and Family: Not on file    Frequency of Social Gatherings with Friends and Family: Not on file    Attends Zoroastrian Services: Not on file    Active Member of Clubs or Organizations: Not on file    Attends Club or Organization Meetings: Not on file    Marital Status: Not on file   Intimate Partner Violence:     Fear of Current or Ex-Partner: Not on file    Emotionally Abused: Not on file    Physically Abused: Not on file    Sexually Abused: Not on file   Housing Stability:     Unable to Pay for Housing in the Last Year: Not on file    Number of [place] in the Last Year: Not on file    Unstable Housing in the Last Year: Not on file         ALLERGIES: Patient has no known allergies. Review of Systems   Respiratory: Positive for shortness of breath. All other systems reviewed and are negative. Vitals:    01/12/22 1002   BP: 134/78   Pulse: 83   Resp: 20   Temp: 98 °F (36.7 °C)   SpO2: 100%   Weight: 95 kg (209 lb 7 oz)   Height: 5' 6.54\" (1.69 m)            Physical Exam  Vitals and nursing note reviewed. Constitutional:       Appearance: He is well-developed. HENT:      Head: Normocephalic and atraumatic. Mouth/Throat:      Mouth: Mucous membranes are moist.   Neck:      Vascular: No JVD. Cardiovascular:      Rate and Rhythm: Normal rate. Pulmonary:      Effort: Pulmonary effort is normal. No tachypnea. Breath sounds: Normal breath sounds. Chest:      Chest wall: No mass or tenderness. Musculoskeletal:      Right lower leg: No tenderness. Edema present. Left lower leg: No tenderness. Edema present. Skin:     General: Skin is warm. Neurological:      Mental Status: He is alert and oriented to person, place, and time.    Psychiatric:         Mood and Affect: Mood normal.          MDM     VITAL SIGNS:  Patient Vitals for the past 4 hrs:   Temp Pulse Resp BP SpO2   01/12/22 1002 98 °F (36.7 °C) 83 20 134/78 100 %         LABS:  Recent Results (from the past 6 hour(s))   CBC WITH AUTOMATED DIFF    Collection Time: 01/12/22 10:17 AM   Result Value Ref Range    WBC 6.7 4.1 - 11.1 K/uL    RBC 3.31 (L) 4.10 - 5.70 M/uL    HGB 10.0 (L) 12.1 - 17.0 g/dL    HCT 32.5 (L) 36.6 - 50.3 %    MCV 98.2 80.0 - 99.0 FL    MCH 30.2 26.0 - 34.0 PG    MCHC 30.8 30.0 - 36.5 g/dL    RDW 14.8 (H) 11.5 - 14.5 %    PLATELET 221 (L) 935 - 400 K/uL    MPV 9.7 8.9 - 12.9 FL    NRBC 0.0 0  WBC    ABSOLUTE NRBC 0.00 0.00 - 0.01 K/uL    NEUTROPHILS 72 32 - 75 %    LYMPHOCYTES 19 12 - 49 %    MONOCYTES 6 5 - 13 %    EOSINOPHILS 2 0 - 7 %    BASOPHILS 0 0 - 1 %    IMMATURE GRANULOCYTES 1 (H) 0.0 - 0.5 %    ABS. NEUTROPHILS 4.8 1.8 - 8.0 K/UL    ABS. LYMPHOCYTES 1.3 0.8 - 3.5 K/UL    ABS. MONOCYTES 0.4 0.0 - 1.0 K/UL    ABS. EOSINOPHILS 0.1 0.0 - 0.4 K/UL    ABS. BASOPHILS 0.0 0.0 - 0.1 K/UL    ABS. IMM. GRANS. 0.1 (H) 0.00 - 0.04 K/UL    DF SMEAR SCANNED      RBC COMMENTS ANISOCYTOSIS  1+       METABOLIC PANEL, COMPREHENSIVE    Collection Time: 01/12/22 10:17 AM   Result Value Ref Range    Sodium 140 136 - 145 mmol/L    Potassium 4.4 3.5 - 5.1 mmol/L    Chloride 103 97 - 108 mmol/L    CO2 25 21 - 32 mmol/L    Anion gap 12 5 - 15 mmol/L    Glucose 131 (H) 65 - 100 mg/dL    BUN 72 (H) 6 - 20 MG/DL    Creatinine 3.64 (H) 0.70 - 1.30 MG/DL    BUN/Creatinine ratio 20 12 - 20      GFR est AA 19 (L) >60 ml/min/1.73m2    GFR est non-AA 16 (L) >60 ml/min/1.73m2    Calcium 9.0 8.5 - 10.1 MG/DL    Bilirubin, total 0.7 0.2 - 1.0 MG/DL    ALT (SGPT) 37 12 - 78 U/L    AST (SGOT) 22 15 - 37 U/L    Alk.  phosphatase 85 45 - 117 U/L    Protein, total 7.1 6.4 - 8.2 g/dL    Albumin 3.4 (L) 3.5 - 5.0 g/dL    Globulin 3.7 2.0 - 4.0 g/dL    A-G Ratio 0.9 (L) 1.1 - 2.2     NT-PRO BNP    Collection Time: 01/12/22 10:17 AM   Result Value Ref Range    NT pro-BNP 10,060 (H) 0 - 450 PG/ML   TROPONIN-HIGH SENSITIVITY    Collection Time: 01/12/22 10:17 AM   Result Value Ref Range    Troponin-High Sensitivity 40 0 - 76 ng/L        IMAGING:  XR CHEST PORT   Final Result   1. Volume loss at the right base. Cannot exclude small right pleural effusion   but overall appearance of the chest unchanged               Medications During Visit:  Medications - No data to display      DECISION MAKING:  Neha Ruffin is a 82 y.o. male who comes in as above. He just here, patient appears well while at rest however with ambulation he dropped down to 83% and then desatted while sitting in bed. Now requiring supplemental oxygen. Most likely acute on chronic CHF. Will admit for further work-up and management      IMPRESSION:  1. Congestive heart failure, unspecified HF chronicity, unspecified heart failure type ([address]    2. Hypoxia        DISPOSITION:  Admitted        Procedures        Perfect Serve Consult for Admission  11:08 AM    ED Room Number: IEN25/26  Patient Name and age:  Neha Ruffin 82 y.o.  male  Working Diagnosis:   1. Congestive heart failure, unspecified HF chronicity, unspecified heart failure type ([address]    2. Hypoxia        COVID-19 Suspicion:  no  Sepsis present:  no  Reassessment needed: no  Code Status:  Full Code  Readmission: no  Isolation Requirements:  no  Recommended Level of Care:  telemetry  Department:[facility] - [phone]  Other:  [place] patient with hypoxia requiring 02.  stable

## 2022-01-12 NOTE — PROGRESS NOTES
FIORELLA:  Return to [facility] independent living level of care phone [phone] and fax [phone], BILLY Brar    Reason for Readmission:     CHF exacerbation          RUR Score/Risk Level:    8% low     PCP: First and Last name:  Dr. Kae Gerber, but pt advised the two NP's normally manage his care   Name of Practice:  Facility practice at [facility]   Are you a current patient: Yes/No:  yes   Approximate date of last visit:  Per pt, NP aware and in agreement with going to ED today 1/12   Can you participate in a virtual visit with your PCP:     Is a Care Conference indicated:  no      Did you attend your follow up appointment (s): If not, why not:  Yes, pt has routine medical care at PCP with [facility]. Pt also has pacemaker replacement scheduled for this month 1/18/2022. Resources/supports as identified by patient/family:   Pt lives with wife who is dependent in care needs. Pt is independent and considered ILF at [facility]. HIs wife is dependent, per his description and the staff at [facility] manage her care. Top Challenges facing patient (as identified by patient/family and CM): No barriers to care or medical follow up. Pt identified the [place] as \"incredible\". Finances/Medication cost?       Transportation        Support system or lack thereof? Living arrangements? Self-care/ADLs/Cognition? Current Advanced Directive/Advance Care Plan:  Pt confirmed his AMD last scanned into EMR in 12/2021 is correct. He lists his wife as primary [address] [place] [address] Bl and his daughter secondary MPOA, Imelda Cuello. He describes his daughter as \"in charge\" and organizes all of their affairs. Plan for utilizing home health:   Pt was working with PT at the [facility] who took vitals before treatment and sent pt to [address] [place] [address]. This is likely therapy at facility.  During last admission, discharge summary and AVS were faxed to facility DON fax [phone] at discharge. Pt expects to return to [facility] at discharge. Transition of Care Plan:    Based on readmission, the patient's previous Plan of Care   has been evaluated and/or modified. The current Transition of Care Plan is:      Likely return to [facility]. Cardiology to evaluate pt once arrives at [facility]. MALAIKA Loco             Care Management Interventions  PCP Verified by CM:  Yes (pt said normally the NP's at [facility] manage his care)  Transition of Care Consult (CM Consult): Discharge Planning  Physical Therapy Consult: Yes (may benefit from prior to discharge)  Occupational Therapy Consult: No  Speech Therapy Consult: No  Support Systems: Spouse/Significant Other,Other Family Member(s),Other (Comment) ([facility])  Confirm Follow Up Transport: Family  The Plan for Transition of Care is Related to the Following Treatment Goals : return home to [place], [place] level of care  The Patient and/or Patient Representative was Provided with a Choice of Provider and Agrees with the Discharge Plan?: Yes  Freedom of Choice List was Provided with Basic Dialogue that Supports the Patient's Individualized Plan of Care/Goals, Treatment Preferences and Shares the Quality Data Associated with the Providers?: Yes  Discharge Location  Discharge Placement: Home with outpatient services      Readmission Assessment  Previous disposition: Other (comment) ([facility])  Who is being interviewed?: Patient  Did you visit your Primary Care Physician after you left the hospital, before you returned this time?: Yes  Did you see a specialist, such as Cardiac, Pulmonary, Orthopedic Physician, etc. after you left the hospital?: Yes  Who advised the patient to return to the hospital?: Other (Comment) (physical therapist prior to treatment at [place], after checking vitals)  Does the patient report anything that got in the way of taking their medications?: No  In our efforts to provide the best possible care to you and others like you, can you think of anything that we could have done to help you after you left the hospital the first time, so that you might not have needed to return so soon?: Other (Comment) (no suggestions)

## 2022-01-12 NOTE — PROGRESS NOTES
Goals Addressed                 This Visit's Progress     Understands red flags post discharge. 12/20/21  CTN spoke to patient daughter Myke Walker to review discharge instructions/red flags     Daily weights, daughter reports she purchased a digital scale over the weekend and instructed pt to weigh every day in the morning. When CTN contacted pt he had not gotten OOB yet to weigh. Myke Walker states she will go to his apt today to get baseline and record weight on chart. Last hospital weight 210lbs   Low sodium diet 1500 mg. Daughter states patient is aware of low sodium diet and knows what he should avoid   Medications: daughter endorses that she picked up new meds and incorporated them into his pill box. She states she fills the pill box weekly and that pt is compliant   Monitor VS and 02 sats---daughter states she will talk to facility to see if they can start taking pt vitals daily. Pt has a BP cuff but is unable to take BP himself. CTN will follow up next week for updates--kimberley    01/12/22  CTN reviewed chart prior to call attempt--patient is currently at [facility] ED.  CTN will follow up tomorrow---kimberley

## 2022-01-12 NOTE — ED TRIAGE NOTES
Pt arrived to ed via ems from [place] for c/o shortness of breath,moon. Not on o2 at home. o2 sats was 87% on ra in route. o2 at 2lpm placed with sats up to 99%. Hx of lung ca,right partial lobectomy,paralyzed right side diaphragm,chf,pacemaker/defibrilator that is due for maintenance in the next few weeks.

## 2022-01-12 NOTE — ED NOTES
o2 sats 82%-84% with ambulation on ra. o2 sats immediately up to 94%-96% at rest on ra after returning to bed. md rahman

## 2022-01-13 NOTE — ED NOTES
Bedside verbal report from Zachary Solis communication provided and informed of purposeful rounding to include collaboration of entire care team; patient acknowledged understanding.

## 2022-01-13 NOTE — ED NOTES
Bedside verbal report given to [facility]. Transfer Assessment: Patient A&O x4 and in no distress. Physical re-examination reveals  improvement in pts condition with reassessment of vital signs completed at the time of admission transfer. Verification of hospital location [facility] and room ER completed with EMS provider.

## 2022-01-13 NOTE — ED NOTES
TRANSFER - OUT REPORT:    Verbal report given to Mariel Beckford RN (name) on Remigio Villalobos.  being transferred to [facility] ER (unit) for routine progression of care       Report consisted of patients Situation, Background, Assessment and   Recommendations(SBAR). Information from the following report(s) SBAR, Kardex, ED Summary, Intake/Output and MAR was reviewed with the receiving nurse. Lines:   Peripheral IV 01/12/22 (Active)        Opportunity for questions and clarification was provided.       Patient transported with:   Monitor  O2 @ 3 liters

## 2022-01-13 NOTE — PROGRESS NOTES
[address] [facility] Adult  Hospitalist Group                                                                                          Hospitalist Progress Note  Karlos Rivera MD  Answering service: [phone] from in house phone        Date of Service:  2022  NAME:  Rick Birmingham. :  1935  MRN:  [id number]      Admission Summary:   HPI: Rick Tobin is a 82 y.o. male past medical history of atrial fibrillation, CKD, DM 2, gout, hypertension, depression, sleep apnea on cpap, and hypothyroidism who presents as a transfer from [facility] for dyspnea, and hypoxia. Admitted for acute on chronic heart failure. He endorses worsening dyspnea for the past 6 months, and presented today because he was having dyspnea at rest.  Associated symptoms include orthopnea. He was last hospitalized  through  for acute on chronic heart failure. He was diuresed, and discharged in stable condition. He does have a history of right lobectomy for lung cancer with subsequent diaphragmatic paresis which was thought to also contribute to his dyspnea. Started on Trelegy inhaler at discharge by pulmonology.     In the ED, he was hypoxic with improvement to 92% on 2 L nasal cannula. Significant for BUN 72, creatinine 3.64 which is near his baseline, and probnp 10,060.      In the ED, he was given IV Lasix 40 mg. He states that he has urinated, but cannot quantify how much. \"    Interval history / Subjective:   States he feels well, some mild sob. Denies f/c/n/v     Assessment & Plan:       #Acute on chronic hFpEF  Class II (echo 2021 with EF 60-65%)  #hx AVR  -Let us post Lasix IV 40 mg x 1 dose  -Strict I&O, and daily weight   Continue to diurese with Bumex  -consulted cardiology , saw Dr. Makenzie Hutchins last time    This is patient's second hospitalization within the past 30 days, ?   Benefit from home health for heart failure management        #Hypertension   Continue metoprolol     #Hypothyroidism   Continue home levothyroxine     #Dyslipidemia   Continue Zetia and Colestid     FEN: cardiac  dvt ppx: lovenox  MPOA: Alfonso Chavez (daughter)  Code: DNR    Spoke to daughter gave her update and answered questions. Care Plan discussed with: Patient/Family and Nurse  Anticipated Disposition:  PT, OT, RN  Anticipated Discharge: 24 hours to 48 hours     Hospital Problems  Date Reviewed: 2/22/2019          Codes Class Noted POA    CHF exacerbation ([facility] [address] ICD-10-CM: I50.9  ICD-9-CM: 428.0  12/12/2021 Unknown                Review of Systems:   A comprehensive review of systems was negative except for that written in the HPI. Vital Signs:    Last 24hrs VS reviewed since prior progress note. Most recent are:  Visit Vitals  /72   Pulse 85   Temp 98 °F (36.7 °C)   Resp 20   Ht 5' 6.54\" (1.69 m)   Wt 95 kg (209 lb 7 oz)   SpO2 92%   BMI 33.26 kg/m²         Intake/Output Summary (Last 24 hours) at 1/13/2022 1346  Last data filed at 1/13/2022 0000  Gross per 24 hour   Intake    Output 400 ml   Net -400 ml        Physical Examination:     I had a face to face encounter with this patient and independently examined them on 1/13/2022 as outlined below:          Constitutional:  No acute distress, cooperative, pleasant    ENT:  Oral mucosa moist, oropharynx benign. Resp:  faint crackles b/l. No wheezing/rhonchi/rales. No accessory muscle use   CV:  Regular rhythm, normal rate, no murmurs, gallops, rubs    GI:  Soft, non distended, non tender. normoactive bowel sounds, no hepatosplenomegaly     Musculoskeletal:  No edema, warm, 2+ pulses throughout    Neurologic:  Moves all extremities.   AAOx3, CN II-XII reviewed            Data Review:    Review and/or order of clinical lab test  Review and/or order of tests in the radiology section of CPT  Review and/or order of tests in the medicine section of CPT      Labs:     Recent Labs     01/12/22  1017   WBC 6.7   HGB 10.0*   HCT 32.5* *     Recent Labs     01/12/22  1017      K 4.4      CO2 25   BUN 72*   CREA 3.64*   *   CA 9.0     Recent Labs     01/12/22  1017   ALT 37   AP 85   TBILI 0.7   TP 7.1   ALB 3.4*   GLOB 3.7     No results for input(s): INR, PTP, APTT, INREXT in the last 72 hours. No results for input(s): FE, TIBC, PSAT, FERR in the last 72 hours. No results found for: FOL, RBCF   No results for input(s): PH, PCO2, PO2 in the last 72 hours. No results for input(s): CPK, CKNDX, TROIQ in the last 72 hours.     No lab exists for component: CPKMB  No results found for: CHOL, CHOLX, CHLST, CHOLV, HDL, HDLP, LDL, LDLC, DLDLP, TGLX, TRIGL, TRIGP, CHHD, CHHDX  Lab Results   Component Value Date/Time    Glucose (POC) 144 (H) 12/17/2021 12:09 PM    Glucose (POC) 111 12/17/2021 08:15 AM    Glucose (POC) 126 (H) 12/16/2021 09:35 PM    Glucose (POC) 108 12/16/2021 04:51 PM    Glucose (POC) 121 (H) 12/16/2021 11:46 AM     Lab Results   Component Value Date/Time    Color YELLOW/STRAW 12/12/2021 12:02 PM    Appearance CLEAR 12/12/2021 12:02 PM    Specific gravity 1.015 12/12/2021 12:02 PM    Specific gravity 1.008 05/15/2014 01:30 PM    pH (UA) 5.5 12/12/2021 12:02 PM    Protein 100 (A) 12/12/2021 12:02 PM    Glucose Negative 12/12/2021 12:02 PM    Ketone Negative 12/12/2021 12:02 PM    Bilirubin Negative 12/12/2021 12:02 PM    Urobilinogen 0.2 12/12/2021 12:02 PM    Nitrites Negative 12/12/2021 12:02 PM    Leukocyte Esterase Negative 12/12/2021 12:02 PM    Epithelial cells FEW 12/12/2021 12:02 PM    Bacteria Negative 12/12/2021 12:02 PM    WBC 0-4 12/12/2021 12:02 PM    RBC 0-5 12/12/2021 12:02 PM         Medications Reviewed:     Current Facility-Administered Medications   Medication Dose Route Frequency    sodium chloride (NS) flush 5-40 mL  5-40 mL IntraVENous Q8H    sodium chloride (NS) flush 5-40 mL  5-40 mL IntraVENous PRN    acetaminophen (TYLENOL) tablet 650 mg  650 mg Oral Q6H PRN    Or    acetaminophen (TYLENOL) suppository 650 mg  650 mg Rectal Q6H PRN    ondansetron (ZOFRAN ODT) tablet 4 mg  4 mg Oral Q8H PRN    Or    ondansetron (ZOFRAN) injection 4 mg  4 mg IntraVENous Q6H PRN    ezetimibe (ZETIA) tablet 10 mg  10 mg Oral DAILY    tamsulosin (FLOMAX) capsule 0.4 mg  0.4 mg Oral DAILY    levothyroxine (SYNTHROID) tablet 150 mcg  150 mcg Oral 6am    metoprolol succinate (TOPROL-XL) XL tablet 25 mg  25 mg Oral QHS    escitalopram oxalate (LEXAPRO) tablet 20 mg  20 mg Oral DAILY    colestipoL (COLESTID) tablet 1 g  1 g Oral BID    cholecalciferol (VITAMIN D3) (400 Units /10 mcg) tablet 2,000 Units  2,000 Units Oral DAILY    bumetanide (BUMEX) injection 2 mg  2 mg IntraVENous Q12H    arformoteroL (BROVANA) neb solution 15 mcg  15 mcg Nebulization BID RT    And    budesonide (PULMICORT) 500 mcg/2 ml nebulizer suspension  500 mcg Nebulization BID RT     Current Outpatient Medications   Medication Sig    bumetanide (BUMEX) 2 mg tablet Take 1 Tablet by mouth daily for 30 days.  albuterol (PROVENTIL HFA, VENTOLIN HFA, PROAIR HFA) 90 mcg/actuation inhaler Take 2 Puffs by inhalation every six (6) hours as needed for Wheezing.  ondansetron hcl (Zofran) 4 mg tablet Take 1 Tablet by mouth every eight (8) hours as needed for Nausea or Vomiting.  colestipoL (COLESTID) 1 gram tablet Take 1 g by mouth two (2) times a day.  exenatide microspheres (Bydureon) 2 mg/0.65 mL pnij 2 mg by SubCUTAneous route every seven (7) days. Thursday (Patient not taking: Reported on 1/12/2022)    apixaban (ELIQUIS) 2.5 mg tablet Take 2.5 mg by mouth two (2) times a day.  acetaminophen (TYLENOL EXTRA STRENGTH) 500 mg tablet Take 500 mg by mouth every six (6) hours as needed for Pain.  Cholecalciferol, Vitamin D3, (VITAMIN D3) 2,000 unit cap capsule Take 5,000 Units by mouth daily.  tamsulosin (FLOMAX) 0.4 mg capsule Take 0.4 mg by mouth daily.     metoprolol succinate (TOPROL-XL) 25 mg XL tablet Take 25 mg by mouth nightly.  SYNTHROID 150 mcg tablet 150 mcg daily.  escitalopram (LEXAPRO) 20 mg tablet Take 20 mg by mouth daily.  ezetimibe (ZETIA) 10 mg tablet take 1 tablet by mouth once daily    colchicine 0.6 mg tablet Take 0.6 mg by mouth daily as needed. (Patient not taking: Reported on 12/12/2021)    sitagliptin (JANUVIA) 50 mg tablet Take 50 mg by mouth daily. (Patient not taking: Reported on 1/12/2022)    atorvastatin (LIPITOR) 20 mg tablet Take 20 mg by mouth daily.      ______________________________________________________________________  EXPECTED LENGTH OF STAY: 3d 19h  ACTUAL LENGTH OF STAY:          1                 Maddy Nielson MD

## 2022-01-13 NOTE — CONSULTS
Cardiology Consult Note    CC: SOB  Reason for consult:  CHF  Requesting MD:  Dr. Alex Salinas     Subjective:      Date of  Admission: 1/12/2022  9:51 AM     Admission type:Emergency    Tahmina Roberts is a 82 y.o. male admitted for CHF exacerbation ([facility] [I50.9]. Patient complains of progressive SOB over last week. He was hospitalized with syncope and acute on chronic systolic HF from 82/34 to 12/17/21. He was discharged to same NH where he did fairly well and did stay on home oxygen. Now he comes back with acute on chronic systolic HF with BNP 97,467. He complains of weakness and some orthopnea. His past cardiac data include CAD, permanent Afib, s/p ICD, h/o cardiomyopathy but improved EF upto 60% on last echo during last admission, chronic systolic HF, CKD stage 5. Denies any weight gain or DALE.     Patient Active Problem List    Diagnosis Date Noted    Near syncope 12/12/2021    Dizziness 12/12/2021    SOB (shortness of breath) 12/12/2021    CHF exacerbation ([facility] 12/12/2021    Syncope, near 12/12/2021    Borderline low O2 saturation 12/12/2021    Thrombocytopenia ([facility] 05/01/2014    HTN (hypertension) 04/29/2014    CKD (chronic kidney disease) stage 3, GFR 30-59 ml/min ([facility]) 04/29/2014    Hyperkalemia 04/29/2014    Respiratory failure, post-operative ([facility] 04/29/2014    Anemia, blood loss 69/82/5085    Metabolic acidosis, normal anion gap (NAG) 04/29/2014    COPD (chronic obstructive pulmonary disease) ([facility] 04/29/2014    SAMIR (obstructive sleep apnea) 04/29/2014    Adenocarcinoma of lung ([facility] 04/29/2014    HEMALATHA (acute kidney injury) ([facility] 04/29/2014    Lung nodules 04/01/2014    Occlusion and stenosis of carotid artery without mention of cerebral infarction 03/22/2011    Cardiac pacemaker in situ 12/08/2010    Arrhythmia Atrial Flutter 11/02/2010    Bradycardia Sick Sinus Syndrome 11/02/2010    CAD (coronary artery disease), native coronary artery 11/02/2010    DM (Diabetes Mellitus-Adult Onset) 11/02/2010    First degree atrioventricular block 11/02/2010    Right bundle branch block and left anterior fascicular block 11/02/2010    Benign hypertensive heart disease without heart failure 11/02/2010    Paroxysmal atrial fibrillation ([facility] [address] 11/02/2010    Sleep apnea 11/02/2010      Mare Rutledge MD  Past Medical History:   Diagnosis Date    Arrhythmia     AFib    Arthritis     Atrial fibrillation ([facility] [address]     Cancer ([facility] [address]     right lung, skin cancer, bladder (instilled medication in bladder for tx)    Chronic kidney disease     Diabetes ([facility] [address]     NIDDM    Gout     Hypertension     Hypothyroidism     Ill-defined condition     neuropathy in feet    Other ill-defined conditions(799.89)     high cholesterol    Pacemaker     Psychiatric disorder     depression    Sleep apnea     uses CPAP      Past Surgical History:   Procedure Laterality Date    COLONOSCOPY N/A 8/8/2017    COLONOSCOPY performed by Mouna Johnson MD at [facility] ENDOSCOPY    COLONOSCOPY N/A 2/22/2019    COLONOSCOPY performed by Latasha Whitfield MD at [facility] ENDOSCOPY    ECHO 2D ADULT  9/2009    LVH, LAE, mild MR, EF 55-60%    HX CATARACT REMOVAL Bilateral     with lens implants    HX HEART CATHETERIZATION  2009    HX HEENT      tonsillectomy    HX KNEE ARTHROSCOPY Right     menisectomy    HX LUMBAR LAMINECTOMY      back surgery / hardware    HX OTHER SURGICAL  4/28/14    BRONCHOSCOPY/RIGHT VIDEO ASSISTED THORACOSCOPY,  2.RIGHT UPPER LOBE WEDGE RESECTION 3. COMPLETION RIGHT UPPER LOBECTOMY 4. MEDIASTINAL LYMPH NODE DISSECTION 5. INTERCOSTAL BLOCK    HX OTHER SURGICAL  4/28/14    Right Thoracoscopy, Ligation of inferior pulmonary ligament bleeding with prolene/clips    HX PACEMAKER Left 2011    AICD--Dr. Burton Crew HX UROLOGICAL      bladder tumor    ND CARDIAC SURG PROCEDURE UNLIST  2010    ablation,  Pacer/defibralator    STRESS TEST CARDIOLITE  4/23/2010    exercise/lexiscan study - poor exercise capacity, normal perfusion, EF 62%    THORACOSCOPY SURG WEDG RESEC LUNG  2015    RUL     Shx; negative smoking or ETOH use    No Known Allergies   Family History   Problem Relation Age of Onset    Cancer Mother         pancreatic    Cancer Father         colon    Cancer Sister         uterine    Cancer Brother         lung, bladder      Current Facility-Administered Medications   Medication Dose Route Frequency    sodium chloride (NS) flush 5-40 mL  5-40 mL IntraVENous Q8H    sodium chloride (NS) flush 5-40 mL  5-40 mL IntraVENous PRN    acetaminophen (TYLENOL) tablet 650 mg  650 mg Oral Q6H PRN    Or    acetaminophen (TYLENOL) suppository 650 mg  650 mg Rectal Q6H PRN    ondansetron (ZOFRAN ODT) tablet 4 mg  4 mg Oral Q8H PRN    Or    ondansetron (ZOFRAN) injection 4 mg  4 mg IntraVENous Q6H PRN    ezetimibe (ZETIA) tablet 10 mg  10 mg Oral DAILY    tamsulosin (FLOMAX) capsule 0.4 mg  0.4 mg Oral DAILY    levothyroxine (SYNTHROID) tablet 150 mcg  150 mcg Oral 6am    metoprolol succinate (TOPROL-XL) XL tablet 25 mg  25 mg Oral QHS    escitalopram oxalate (LEXAPRO) tablet 20 mg  20 mg Oral DAILY    colestipoL (COLESTID) tablet 1 g  1 g Oral BID    cholecalciferol (VITAMIN D3) (400 Units /10 mcg) tablet 2,000 Units  2,000 Units Oral DAILY    bumetanide (BUMEX) injection 2 mg  2 mg IntraVENous Q12H    arformoteroL (BROVANA) neb solution 15 mcg  15 mcg Nebulization BID RT    And    budesonide (PULMICORT) 500 mcg/2 ml nebulizer suspension  500 mcg Nebulization BID RT     Current Outpatient Medications   Medication Sig    bumetanide (BUMEX) 2 mg tablet Take 1 Tablet by mouth daily for 30 days.  albuterol (PROVENTIL HFA, VENTOLIN HFA, PROAIR HFA) 90 mcg/actuation inhaler Take 2 Puffs by inhalation every six (6) hours as needed for Wheezing.  ondansetron hcl (Zofran) 4 mg tablet Take 1 Tablet by mouth every eight (8) hours as needed for Nausea or Vomiting.     colestipoL (COLESTID) 1 gram tablet Take 1 g by mouth two (2) times a day.  exenatide microspheres (Bydureon) 2 mg/0.65 mL pnij 2 mg by SubCUTAneous route every seven (7) days. Thursday (Patient not taking: Reported on 1/12/2022)    apixaban (ELIQUIS) 2.5 mg tablet Take 2.5 mg by mouth two (2) times a day.  acetaminophen (TYLENOL EXTRA STRENGTH) 500 mg tablet Take 500 mg by mouth every six (6) hours as needed for Pain.  Cholecalciferol, Vitamin D3, (VITAMIN D3) 2,000 unit cap capsule Take 5,000 Units by mouth daily.  tamsulosin (FLOMAX) 0.4 mg capsule Take 0.4 mg by mouth daily.  metoprolol succinate (TOPROL-XL) 25 mg XL tablet Take 25 mg by mouth nightly.  SYNTHROID 150 mcg tablet 150 mcg daily.  escitalopram (LEXAPRO) 20 mg tablet Take 20 mg by mouth daily.  ezetimibe (ZETIA) 10 mg tablet take 1 tablet by mouth once daily    colchicine 0.6 mg tablet Take 0.6 mg by mouth daily as needed. (Patient not taking: Reported on 12/12/2021)    sitagliptin (JANUVIA) 50 mg tablet Take 50 mg by mouth daily. (Patient not taking: Reported on 1/12/2022)    atorvastatin (LIPITOR) 20 mg tablet Take 20 mg by mouth daily. Prior to Admission Medications:  Prior to Admission medications    Medication Sig Start Date End Date Taking? Authorizing Provider   bumetanide (BUMEX) 2 mg tablet Take 1 Tablet by mouth daily for 30 days. 12/18/21 1/17/22  Topher Miramontes MD   albuterol (PROVENTIL HFA, VENTOLIN HFA, PROAIR HFA) 90 mcg/actuation inhaler Take 2 Puffs by inhalation every six (6) hours as needed for Wheezing. 12/17/21   Topher Miramontes MD   ondansetron hcl (Zofran) 4 mg tablet Take 1 Tablet by mouth every eight (8) hours as needed for Nausea or Vomiting. 12/17/21   Topher Miramontes MD   colestipoL (COLESTID) 1 gram tablet Take 1 g by mouth two (2) times a day. Provider, Historical   exenatide microspheres (Bydureon) 2 mg/0.65 mL pnij 2 mg by SubCUTAneous route every seven (7) days.  Thursday  Patient not taking: Reported on 2022    Other, MD Manoj   apixaban (ELIQUIS) 2.5 mg tablet Take 2.5 mg by mouth two (2) times a day. Provider, Historical   acetaminophen (TYLENOL EXTRA STRENGTH) 500 mg tablet Take 500 mg by mouth every six (6) hours as needed for Pain. Provider, Historical   Cholecalciferol, Vitamin D3, (VITAMIN D3) 2,000 unit cap capsule Take 5,000 Units by mouth daily. Provider, Historical   tamsulosin (FLOMAX) 0.4 mg capsule Take 0.4 mg by mouth daily. 11/10/16   Provider, Historical   metoprolol succinate (TOPROL-XL) 25 mg XL tablet Take 25 mg by mouth nightly. 11/18/15   Provider, Historical   SYNTHROID 150 mcg tablet 150 mcg daily. 4/29/15   Provider, Historical   escitalopram (LEXAPRO) 20 mg tablet Take 20 mg by mouth daily. Provider, Historical   ezetimibe (ZETIA) 10 mg tablet take 1 tablet by mouth once daily 11   Katharine Navarro MD   colchicine 0.6 mg tablet Take 0.6 mg by mouth daily as needed. Patient not taking: Reported on 2021    Provider, Historical   sitagliptin (JANUVIA) 50 mg tablet Take 50 mg by mouth daily. Patient not taking: Reported on 2022    Provider, Historical   atorvastatin (LIPITOR) 20 mg tablet Take 20 mg by mouth daily. Provider, Historical        Review of Symptoms:  Except as noted in HPI, patient denies recent fever or chills, nausea, vomiting, diarrhea, hemoptysis, hematemesis, dysuria, myalgias, focal neurologic symptoms, ecchymosis, angioedema, odynophagia, dysphagia, sore throat, earache,rash, melena, hematochezia, depression, GERD, cold intolerance, petechia, bleeding gums, or significant weight loss. A comprehensive review of systems was negative except for that written in the HPI. Subjective:    24 hr VS reviewed, overall VSSAF  Temp (24hrs), Av °F (36.7 °C), Min:97.9 °F (36.6 °C), Max:98 °F (36.7 °C)    No data found. No data found. No data found.        Intake/Output Summary (Last 24 hours) at 2022 1358  Last data filed at 1/13/2022 0000  Gross per 24 hour   Intake    Output 400 ml   Net -400 ml         Physical Exam (complete single organ system exam)        Visit Vitals  /72   Pulse 85   Temp 98 °F (36.7 °C)   Resp 20   Ht 5' 6.54\" (1.69 m)   Wt 209 lb 7 oz (95 kg)   SpO2 92%   BMI 33.26 kg/m²     General Appearance:  Well developed, well nourished,alert and oriented x 3, and individual in no acute distress. Ears/Nose/Mouth/Throat:   Hearing grossly normal.         Neck: Supple. Chest:   Lungs rales at bases   Cardiovascular:  irregular rate and rhythm, S1, S2 normal, no murmur. Abdomen:   Soft, non-tender, bowel sounds are active. Extremities: No edema bilaterally. Skin: Warm and dry.                Cardiographics    Telemetry: AFIB, V paced beats  ECG:  atrial fibrillation, rate 80, V paced beats  Echocardiogram: Not done    Labs:   Recent Results (from the past 24 hour(s))   SARS-COV-2    Collection Time: 01/12/22 10:58 PM   Result Value Ref Range    SARS-CoV-2 Nasopharyngeal     SARS-COV-2    Collection Time: 01/12/22 10:58 PM   Result Value Ref Range    Specimen source Nasopharyngeal      SARS-CoV-2 Not detected NOTD          Assessment:     Assessment:   SOB; due to acute on chronic diastolic HF and renal failure  H/o cardiomyopathy; was ischemic but resolved  CKD; stage 5 and is worse  CAD; asymptomatic  S/p ICD; needs a new generator  Permanent Afib; rate is controlled      Plan:   Tele  IV Diuretic  Interrogate ICD  Continue other cardiac regimen same  Consult renal service    Marisela Conklin MD

## 2022-01-13 NOTE — H&P
[address] [facility] Adult  Hospitalist Group  History and Physical    Primary Care Provider: Isabel Bernal MD  Date of Service:  1/13/2022    Subjective:     Nanda Lafleur is a 82 y.o. male past medical history of atrial fibrillation, CKD, DM 2, gout, hypertension, depression, sleep apnea on cpap, and hypothyroidism who presents as a transfer from [facility] for dyspnea, and hypoxia. Admitted for acute on chronic heart failure. He endorses worsening dyspnea for the past 6 months, and presented today because he was having dyspnea at rest.  Associated symptoms include orthopnea. He was last hospitalized December 12 through December 17th for acute on chronic heart failure. He was diuresed, and discharged in stable condition. He does have a history of right lobectomy for lung cancer with subsequent diaphragmatic paresis which was thought to also contribute to his dyspnea. Started on Trelegy inhaler at discharge by pulmonology. In the ED, he was hypoxic with improvement to 92% on 2 L nasal cannula. Significant for BUN 72, creatinine 3.64 which is near his baseline, and probnp 10,060. In the ED, he was given IV Lasix 40 mg. He states that he has urinated, but cannot quantify how much. Review of Systems:    All other review of systems were negative except for that written in the History of Present Illness.      Past Medical History:   Diagnosis Date    Arrhythmia     AFib    Arthritis     Atrial fibrillation ([address]     Cancer ([address]     right lung, skin cancer, bladder (instilled medication in bladder for tx)    Chronic kidney disease     Diabetes ([address]     NIDDM    Gout     Hypertension     Hypothyroidism     Ill-defined condition     neuropathy in feet    Other ill-defined conditions(799.89)     high cholesterol    Pacemaker     Psychiatric disorder     depression    Sleep apnea     uses CPAP      Past Surgical History:   Procedure Laterality Date    COLONOSCOPY N/A 8/8/2017 COL[facility]COPY performed by Majo August [facility] at [facility] ENDOSCOPY    COLONOSCOPY N/A 2/22/2019    COLONOSCOPY performed by Fang Appiah MD at [facility] ENDOSCOPY    ECHO 2D ADULT  9/2009    LVH, LAE, mild MR, EF 55-60%    HX CATARACT REMOVAL Bilateral     with lens implants    HX HEART CATHETERIZATION  2009    HX HEENT      tonsillectomy    HX KNEE ARTHROSCOPY Right     menisectomy    HX LUMBAR LAMINECTOMY      back surgery / hardware    HX OTHER SURGICAL  4/28/14    BRONCHOSCOPY/RIGHT VIDEO ASSISTED THORACOSCOPY,  2.RIGHT UPPER LOBE WEDGE RESECTION 3. COMPLETION RIGHT UPPER LOBECTOMY 4. MEDIASTINAL LYMPH NODE DISSECTION 5. INTERCOSTAL BLOCK    HX OTHER SURGICAL  4/28/14    Right Thoracoscopy, Ligation of inferior pulmonary ligament bleeding with prolene/clips    HX PACEMAKER Left 2011    AICD--Dr. Bill Hooks    HX UROLOGICAL      bladder tumor    GA CARDIAC SURG PROCEDURE UNLIST  2010    ablation,  Pacer/defibralator    STRESS TEST CARDIOLITE  4/23/2010    exercise/lexiscan study - poor exercise capacity, normal perfusion, EF 62%    THORACOSCOPY SURG WEDG RESEC LUNG  2015    RUL     Prior to Admission medications    Medication Sig Start Date End Date Taking? Authorizing Provider   bumetanide (BUMEX) 2 mg tablet Take 1 Tablet by mouth daily for 30 days. 12/18/21 1/17/22  Leena Taylor MD   albuterol (PROVENTIL HFA, VENTOLIN HFA, PROAIR HFA) 90 mcg/actuation inhaler Take 2 Puffs by inhalation every six (6) hours as needed for Wheezing. 12/17/21   Leena Taylor MD   ondansetron hcl (Zofran) 4 mg tablet Take 1 Tablet by mouth every eight (8) hours as needed for Nausea or Vomiting. 12/17/21   Leena Taylor MD   colestipoL (COLESTID) 1 gram tablet Take 1 g by mouth two (2) times a day. Provider, Historical   exenatide microspheres (Bydureon) 2 mg/0.65 mL pnij 2 mg by SubCUTAneous route every seven (7) days.  Thursday  Patient not taking: Reported on 1/12/2022    Manoj Huddleston MD   apixaban (ELIQUIS) 2.5 mg tablet Take 2.5 mg by mouth two (2) times a day. Provider, Historical   acetaminophen (TYLENOL EXTRA STRENGTH) 500 mg tablet Take 500 mg by mouth every six (6) hours as needed for Pain. Provider, Historical   Cholecalciferol, Vitamin D3, (VITAMIN D3) 2,000 unit cap capsule Take 5,000 Units by mouth daily. Provider, Historical   tamsulosin (FLOMAX) 0.4 mg capsule Take 0.4 mg by mouth daily. 11/10/16   Provider, Historical   metoprolol succinate (TOPROL-XL) 25 mg XL tablet Take 25 mg by mouth nightly. 11/18/15   Provider, Historical   SYNTHROID 150 mcg tablet 150 mcg daily. 4/29/15   Provider, Historical   escitalopram (LEXAPRO) 20 mg tablet Take 20 mg by mouth daily. Provider, Historical   ezetimibe (ZETIA) 10 mg tablet take 1 tablet by mouth once daily 7/8/11   Colten Castro MD   colchicine 0.6 mg tablet Take 0.6 mg by mouth daily as needed. Patient not taking: Reported on 12/12/2021    Provider, Historical   sitagliptin (JANUVIA) 50 mg tablet Take 50 mg by mouth daily. Patient not taking: Reported on 1/12/2022    Provider, Historical   atorvastatin (LIPITOR) 20 mg tablet Take 20 mg by mouth daily. Provider, Historical     No Known Allergies   Family History   Problem Relation Age of Onset    Cancer Mother         pancreatic    Cancer Father         colon    Cancer Sister         uterine    Cancer Brother         lung, bladder        SOCIAL HISTORY:  Patient resides at Home. Patient ambulates with walker  Smoking history: none  Alcohol history: rare    Objective:       Physical Exam:   Visit Vitals  /74   Pulse 84   Temp 98 °F (36.7 °C)   Resp 22   Ht 5' 6.54\" (1.69 m)   Wt 95 kg (209 lb 7 oz)   SpO2 92%   BMI 33.26 kg/m²     General:  Alert, cooperative, no distress, appears stated age. Head:  Normocephalic, without obvious abnormality, atraumatic. Eyes:  Conjunctivae/corneas clear. PERRL, EOMs intact. Nose: Nares normal. Septum midline.     Throat: Lips, mucosa, and tongue normal.    Neck: Supple, symmetrical, trachea midline   Back:   Symmetric, no curvature. ROM normal. No CVA tenderness. Lungs:   Clear to auscultation bilaterally. Chest wall:  No tenderness or deformity. Heart:  Regular rate and rhythm, S1, S2 normal, no murmur, click, rub or gallop. Abdomen:   Soft, non-tender. Bowel sounds normal. No masses,  No organomegaly. Extremities: Extremities normal, atraumatic, no cyanosis or edema. Pulses: 2+ and symmetric all extremities. Skin: Skin color, texture, turgor normal. No rashes or lesions       Neurologic:  Normal strength, sensation and reflexes throughout. ECG:  A-fib, V-paced     Data Review: All diagnostic labs and studies have been reviewed. Chest x-ray: volume loss at R base c/w lobectomy with possible small R pleural effusion. No other acutely significant cardiopulmonary process. Assessment:     Active Problems:    CHF exacerbation ([address] (12/12/2021)        Plan:   #Dyspnea on Exertion  #Acute on chronic hFpEF (echo 12/2021 with EF 60-65%)  #hx AVR  -Let us post Lasix IV 40 mg x 1 dose  -Strict I&O, and daily weight   Continue to diurese with Bumex   This is patient's second hospitalization within the past 30 days, ?   Benefit from home health for heart failure management      #Hypertension   Continue metoprolol    #Hypothyroidism   Continue home levothyroxine    #Dyslipidemia   Continue Zetia and Colestid    FEN: cardiac  dvt ppx: lovenox  MPOA: Connie Bansal (daughter)  Code: DNR    FUNCTIONAL STATUS PRIOR TO HOSPITALIZATION Ambulatory with Use of Assistive Devices walker  Signed By: Dulce Avalos MD     January 13, 2022

## 2022-01-13 NOTE — PROGRESS NOTES
1/13/2022 -   TRANSITIONS OF CARE PLAN:   1. RUR: 23%; HIGH  2. DESTINATION: [facility] (Vance CERVANTES: [phone]; F: [phone])  3. TRANSPORT: TBD - possibly family  4. NEEDS FOR DISCHARGE: Home O2, Possible HH  5. ANTICIPATED FOLLOW UPS: PCP  6. ONGOING INPATIENT NEEDS: 6MWT, Diuresing     Anticipated Discharge is: 24-48 Hours    09:15 -   CM notes patient's transfer from [facility] to [facility] overnight. CM notes Home O2 Orders. CM discussed need for patient's O2 SATs documentation with nursing. Patient's 6MWT must include: O2 SATs at rest, O2 SATs without O2 while ambulating, de-SATs, Recovery Method, O2 SATs with O2 while ambulating, and resting O2 SATs on O2. If patient is not able to complete the 6MWT, nursing can complete the .oxygen smart phrase documentation. NOTE: All O2 testing documentation is valid for 24 hours. CM to continue following for appropriate testing documentation to submit Home O2 orders.

## 2022-01-13 NOTE — NURSE NAVIGATOR
Chart reviewed by Heart Failure Nurse Navigator. Heart Failure database completed. EF:  Echo done 12/13/21 EF 60/65%    ACEi/ARB/ARNi: not currently indicated    BB: toprol xl 25 mg daily    Aldosterone Antagonist: not currently indicated    Obstructive Sleep Apnea Screening:   STOP-BANG score:   Referred to Sleep Medicine:     CRT: has ICD/pacer 2010     NYHA Functional Class  II    Heart Failure Teach Back in Patient Education. Heart Failure Avoiding Triggers on Discharge Instructions. Cardiologist: is followed by Dr. Jacek Murphy ([facility]); Dr. Ernesto De Los Santos ([facility]) consulted      Post discharge follow up phone call to be made within 48-72 hours of discharge. 27 Day HF Readmission:    Prior hospitalization  12/12/21 - 12/17/21  Final coding: Hypertensive heart and chronic kidney disease with heart failure and stage 1 through stage 4 chronic kidney disease, or unspecified chronic kidney disease    Discharging Unit: [facility] discharge was done)      Discharging MD: Dr. Jose Elias Clifford date: 1/12/22    Patient was discharged to [address] [place] [address] with orders for Mikel Kelly PT Jennifer Luque PT). Case Management Specialist attempted to schedule PCP visit but did not receive call back from PCP office. Cardiology follow up was scheduled with first available appointment 12/29/21 with Cynthia Thomas NP (for Dr. Jacek Murphy) at 2:30 PM.      Post discharge phone call was made by Care Transition Nurse on 12/20/21. CTN spoke with patient's daughter who informed that patient would be seen by the [facility] MD on 12/20/21. [facility] had been ordered but [facility] does not contract with . Patient presented to ED via EMS 1/12/22 with complaint of shortness of breath, dyspnea on exertion. O2 sats 87% on room air, up to 99% on 2 L O2. NT Pro BNP elevated to > 10,000. Patient has been given IV diuretics in ED. Cardiology has not yet been consulted at this time.   Patient remains roomed in ED.

## 2022-01-14 NOTE — PROGRESS NOTES
Problem: Mobility Impaired (Adult and Pediatric)  Goal: *Acute Goals and Plan of Care (Insert Text)  Description: FUNCTIONAL STATUS PRIOR TO ADMISSION: Patient was modified independent using a rollator for functional mobility. HOME SUPPORT PRIOR TO ADMISSION: The patient lived with wife but did not require assist. Pt's wife dependent with ADLs and facility assists with her care. Physical Therapy Goals  Initiated 1/14/2022  1. Patient will move from supine to sit and sit to supine , scoot up and down, and roll side to side in bed with independence within 7 day(s). 2.  Patient will transfer from bed to chair and chair to bed with independence using the least restrictive device within 7 day(s). 3.  Patient will perform sit to stand with independence within 7 day(s). 4.  Patient will ambulate with modified independence for 150 feet with the least restrictive device within 7 day(s). Outcome: Progressing Towards Goal   PHYSICAL THERAPY EVALUATION  Patient: Traci Lucio (82 y.o. male)  Date: 1/14/2022  Primary Diagnosis: CHF exacerbation ([facility] [I50.9]        Precautions:   Fall    ASSESSMENT  Based on the objective data described below, the patient presents with decreased activity tolerance, decreased balance, decreased functional mobility and decreased ambulation distance. Pt requires seated rest break on rollator after 80ft of ambulation. Verbally cued to lock brakes before sitting. Pt largely SBA for mobility and will benefit from supplemental O2 and HHPT at discharge. Pulse Oximetry Assessment     90% at rest on room air  84% while ambulating on room air  98% at rest on 2LPM  96% while ambulating on 2LPM       Current Level of Function Impacting Discharge (mobility/balance): requires O2    Functional Outcome Measure: The patient scored 75/100 on the Barthel outcome measure.       Other factors to consider for discharge: from [address] [place] [address], recent admission     Patient will benefit from skilled therapy intervention to address the above noted impairments. PLAN :  Recommendations and Planned Interventions: bed mobility training, transfer training, gait training, therapeutic exercises, neuromuscular re-education, patient and family training/education, and therapeutic activities      Frequency/Duration: Patient will be followed by physical therapy:  5 times a week to address goals. Recommendation for discharge: (in order for the patient to meet his/her long term goals)  Physical therapy at least 2 days/week in the home     This discharge recommendation:  Has been made in collaboration with the attending provider and/or case management    IF patient discharges home will need the following DME: portable oxygen         SUBJECTIVE:   Patient stated I am more SOB that I was before.     OBJECTIVE DATA SUMMARY:   HISTORY:    Past Medical History:   Diagnosis Date    Arrhythmia     AFib    Arthritis     Atrial fibrillation ([facility]     Cancer ([facility]     right lung, skin cancer, bladder (instilled medication in bladder for tx)    Chronic kidney disease     Diabetes ([facility]     NIDDM    Gout     Hypertension     Hypothyroidism     Ill-defined condition     neuropathy in feet    Other ill-defined conditions(799.89)     high cholesterol    Pacemaker     Psychiatric disorder     depression    Sleep apnea     uses CPAP     Past Surgical History:   Procedure Laterality Date    COLONOSCOPY N/A 8/8/2017    COLONOSCOPY performed by Frederich Halsted, MD at [facility] ENDOSCOPY    COLONOSCOPY N/A 2/22/2019    COLONOSCOPY performed by Cherisse Ormond, MD at [facility] ENDOSCOPY    ECHO 2D ADULT  9/2009    LVH, LAE, mild MR, EF 55-60%    HX CATARACT REMOVAL Bilateral     with lens implants    HX HEART CATHETERIZATION  2009    HX HEENT      tonsillectomy    HX KNEE ARTHROSCOPY Right     menisectomy    HX LUMBAR LAMINECTOMY      back surgery / hardware    HX OTHER SURGICAL  4/28/14    BRONCHOSCOPY/RIGHT VIDEO ASSISTED THORACOSCOPY, 2. RIGHT UPPER LOBE WEDGE RESECTION 3. COMPLETION RIGHT UPPER LOBECTOMY 4. MEDIASTINAL LYMPH NODE DISSECTION 5. INTERCOSTAL BLOCK    HX OTHER SURGICAL  4/28/14    Right Thoracoscopy, Ligation of inferior pulmonary ligament bleeding with prolene/clips    HX PACEMAKER Left 2011    AICD--Dr. Nathaniel Weems UROLOGICAL      bladder tumor    AL CARDIAC SURG PROCEDURE UNLIST  2010    ablation,  Pacer/defibralator    STRESS TEST CARDIOLITE  4/23/2010    exercise/lexiscan study - poor exercise capacity, normal perfusion, EF 62%    THORACOSCOPY SURG WEDG RESEC LUNG  2015    RUL       Personal factors and/or comorbidities impacting plan of care: cancer, CKD, diabetes    Home Situation  Home Environment: Independent living [facility])  # Steps to Enter: 0 (elevator access)  One/Two Story Residence: One story  Living Alone: No  Support Systems: Spouse/Significant Other,Child(beverly)  Patient Expects to be Discharged to[de-identified] Independent living facility  Current DME Used/Available at Home: Clide Seashore, rollator,Shower chair,Grab bars,Other (comment) (emergency call pull cords, bed rails)  Tub or Shower Type: Shower    EXAMINATION/PRESENTATION/DECISION MAKING:   Critical Behavior:  Neurologic State: Alert  Orientation Level: Oriented X4  Cognition: Follows commands,Appropriate decision making  Safety/Judgement: Fall prevention,Home safety  Hearing: Auditory  Auditory Impairment: None  Skin:    Edema:   Range Of Motion:  AROM: Within functional limits           PROM: Within functional limits           Strength:    Strength: Generally decreased, functional                    Tone & Sensation:   Tone: Normal                              Coordination:     Vision:      Functional Mobility:  Bed Mobility:           Scooting: Independent  Transfers:  Sit to Stand: Stand-by assistance  Stand to Sit: Stand-by assistance                       Balance:   Sitting: Intact  Standing: Impaired; Without support  Standing - Static: Good;Constant support  Standing - Dynamic : Fair  Ambulation/Gait Training:  Distance (ft): 80 Feet (ft) (x 2 with seated rest break)  Assistive Device: Gait belt;Walker, rollator  Ambulation - Level of Assistance: Stand-by assistance        Gait Abnormalities: Decreased step clearance        Base of Support: Widened     Speed/Alicia: Fluctuations  Step Length: Left shortened;Right shortened                     Stairs: Therapeutic Exercises:       Functional Measure:  Barthel Index:    Bathin  Bladder: 5  Bowels: 10  Groomin  Dressing: 10  Feeding: 10  Mobility: 10  Stairs: 5  Toilet Use: 5  Transfer (Bed to Chair and Back): 10  Total: 75/100       The Barthel ADL Index: Guidelines  1. The index should be used as a record of what a patient does, not as a record of what a patient could do. 2. The main aim is to establish degree of independence from any help, physical or verbal, however minor and for whatever reason. 3. The need for supervision renders the patient not independent. 4. A patient's performance should be established using the best available evidence. Asking the patient, friends/relatives and nurses are the usual sources, but direct observation and common sense are also important. However direct testing is not needed. 5. Usually the patient's performance over the preceding 24-48 hours is important, but occasionally longer periods will be relevant. 6. Middle categories imply that the patient supplies over 50 per cent of the effort. 7. Use of aids to be independent is allowed. Score Interpretation (from [address] [address])    Independent   60-79 Minimally independent   40-59 Partially dependent   20-39 Very dependent   <20 Totally dependent     -Corey Wyman., Barthel, D.W. (1965). Functional evaluation: the Barthel Index. [address] [facility] ([address] (1997). The Barthel activities of daily living index: self-reporting versus actual performance in the old (> or = 75 years). Journal of [address] [place] [address] 457), [facility], [facility].., Cyndie Carnes., Jenny Haile. (1999). Measuring the change in disability after inpatient rehabilitation; comparison of the responsiveness of the Barthel Index and Functional [place] Measure. Journal of Neurology, Neurosurgery, and Psychiatry, 66(4), 703-491. FERNIE Siu, EDIE Stone, & Stacy Hughes MSussyA. (2004) Assessment of post-stroke quality of life in cost-effectiveness studies: The usefulness of the Barthel Index and the EuroQoL-5D. Quality of Life Research, 15, 294-65            Physical Therapy Evaluation Charge Determination   History Examination Presentation Decision-Making   MEDIUM  Complexity : 1-2 comorbidities / personal factors will impact the outcome/ POC  MEDIUM Complexity : 3 Standardized tests and measures addressing body structure, function, activity limitation and / or participation in recreation  LOW Complexity : Stable, uncomplicated  Other outcome measures Barthel  LOW       Based on the above components, the patient evaluation is determined to be of the following complexity level: LOW     Pain Rating:      Activity Tolerance:   Fair, desaturates with exertion and requires oxygen, and requires rest breaks    After treatment patient left in no apparent distress:   Supine in bed, Call bell within reach, and Caregiver / family present    COMMUNICATION/EDUCATION:   The patients plan of care was discussed with: Registered nurse. Fall prevention education was provided and the patient/caregiver indicated understanding., Patient/family have participated as able in goal setting and plan of care. , and Patient/family agree to work toward stated goals and plan of care.     Thank you for this referral.  Maryanne Toussaint, PT   Time Calculation: 27 mins

## 2022-01-14 NOTE — PROGRESS NOTES
Problem: Self Care Deficits Care Plan (Adult)  Goal: *Acute Goals and Plan of Care (Insert Text)  Description: FUNCTIONAL STATUS PRIOR TO ADMISSION: Patient was modified independent using a rollator for functional mobility. Patient was modified independent for basic and requires assist instrumental ADLs, daughter completes medication management and ILF provides meals. HOME SUPPORT: The patient lived with his wife who receives assisted living services. Occupational Therapy Goals  Initiated 1/14/2022  1. Patient will perform grooming standing at sink with modified independence within 7 day(s). 2.  Patient will perform lower body dressing with modified independence within 7 day(s). 3.  Patient will perform bathing with modified independence within 7 day(s). 4.  Patient will perform toilet transfers with modified independence within 7 day(s). 5.  Patient will perform all aspects of toileting with modified independence within 7 day(s). 6.  Patient will participate in upper extremity therapeutic exercise/activities with independence for 10 minutes within 7 day(s). 7.  Patient will utilize energy conservation techniques during functional activities with verbal cues within 7 day(s). Outcome: Not Met   OCCUPATIONAL THERAPY EVALUATION  Patient: Gibson Mtz (82 y.o. male)  Date: 1/14/2022  Primary Diagnosis: CHF exacerbation ([facility] [I50.9]        Precautions: Fall    ASSESSMENT  Based on the objective data described below, the patient presents with decreased activity tolerance, oxygen desaturation on room air, impaired standing balance using rollator for mobility, decreased generalized strength, requires increased assist for self care and functional mobility/transfers. Patient sitting in recliner upon OT entry and agreeable to participate in therapy. Patient on 3L upon therapy entry and at 98% at rest, nursing decreased oxygen to 2L and approved mobility on room air.  Patient dropped to 84% after activity to and from bathroom on room air, nasal cannula replaced at 2L and patient improved to 91% with cues for pursed lip breathing. Patient ambulated to and from bathroom with rollator, noted to ambulate quickly and occasionally bumping rollator into doors, walls, etc. Patient's daughter is present and reports this is baseline for him, reports patient had a fall out of bed while sleeping and now has a bed rail to keep patient from rolling out of bed at night, no other falls reported. Patient able to void on toilet and setup A for toileting, stood at sink ~30 seconds to wash hands and returned to recliner. Breakfast tray present at this time and patient requesting to seat up in chair for meal. Patient would benefit from skilled OT services during admission to improve independence with self care and functional mobility/transfers. Recommend discharge to [facility] with home health services at this time. Daughter reports that patient was supposed to start [address] after last hospital discharge, due to holidays and patient deferrals, had not started services prior to this admission. Patient's living facility has been on lock down due to COVID so meals have been delivered to room and patient has had decreased mobility over last several weeks, per daughter. Current Level of Function Impacting Discharge (ADLs/self-care): setup/SBA for activities of daily living, SBA to CGA for mobility and transfers    Functional Outcome Measure: The patient scored Total: 75/100 on the Barthel Index outcome measure which is indicative of being minimally dependent in basic self-care. Other factors to consider for discharge: time since onset, limited activity over last several weeks, prior level of function independent, wife receives assisted living services but pt is independent living     Patient will benefit from skilled therapy intervention to address the above noted impairments.        PLAN :  Recommendations and Planned Interventions: self care training, functional mobility training, therapeutic exercise, balance training, therapeutic activities, endurance activities, patient education, home safety training and family training/education    Frequency/Duration: Patient will be followed by occupational therapy 3 times a week to address goals. Recommendation for discharge: (in order for the patient to meet his/her long term goals)  Occupational therapy at least 2 days/week in the home     This discharge recommendation:  Has been made in collaboration with the attending provider and/or case management    IF patient discharges home will need the following DME: patient owns DME required for discharge       SUBJECTIVE:   Patient stated My back is bothering me.     OBJECTIVE DATA SUMMARY:   HISTORY:   Past Medical History:   Diagnosis Date    Arrhythmia     AFib    Arthritis     [facility]     Cancer ([facility]     right lung, skin cancer, bladder (instilled medication in bladder for tx)    Chronic kidney disease     Diabetes ([facility]     NIDDM    Gout     Hypertension     Hypothyroidism     Ill-defined condition     neuropathy in feet    Other ill-defined conditions(799.89)     high cholesterol    Pacemaker     Psychiatric disorder     depression    Sleep apnea     uses CPAP     Past Surgical History:   Procedure Laterality Date    COLONOSCOPY N/A 8/8/2017    COLONOSCOPY [facility] Tc Galvan [facility]COPY    COLONOSCOPY N/A 2/22/2019    COLONOSCOPY performed by Ana Pelaez MD at [facility] ENDOSCOPY    ECHO 2D ADULT  9/2009    LVH, LAE, mild MR, EF 55-60%    HX CATARACT REMOVAL Bilateral     with lens implants    HX HEART CATHETERIZATION  2009    HX HEENT      tonsillectomy    HX KNEE ARTHROSCOPY Right     menisectomy    HX LUMBAR LAMINECTOMY      back surgery / hardware    HX OTHER SURGICAL  4/28/14    BRONCHOSCOPY/RIGHT VIDEO ASSISTED THORACOSCOPY,  2.RIGHT UPPER LOBE WEDGE RESECTION 3. COMPLETION RIGHT UPPER LOBECTOMY 4. MEDIASTINAL LYMPH NODE DISSECTION 5. INTERCOSTAL BLOCK    HX OTHER SURGICAL  4/28/14    Right Thoracoscopy, Ligation of inferior pulmonary ligament bleeding with prolene/clips    HX PACEMAKER Left 2011    AICD--Dr. Victor Slider    HX UROLOGICAL      bladder tumor    [place] CARDIAC SURG PROCEDURE UNLIST  2010    ablation,  Pacer/defibralator    STRESS TEST CARDIOLITE  4/23/2010    exercise/lexiscan study - poor exercise capacity, normal perfusion, EF 62%    THORACOSCOPY SURG WEDG RESEC LUNG  2015    RUL       Expanded or extensive additional review of patient history:     Home Situation  Home Environment: Independent living  # Steps to Enter: 0 (elevator access)  One/Two Story Residence: One story  Living Alone: No  Support Systems: Spouse/Significant Other,Child(beverly)  Patient Expects to be Discharged to[de-identified] Independent living facility  Current DME Used/Available at Home: Elnoria Anuj, rollator,Shower chair,Grab bars,Other (comment) (emergency call pull cords, bed rails)  Tub or Shower Type: Shower    Hand dominance: Right    EXAMINATION OF PERFORMANCE DEFICITS:  Cognitive/Behavioral Status:  Neurologic State: Alert  Orientation Level: Oriented X4  Cognition: Follows commands; Appropriate decision making        Safety/Judgement: Fall prevention;Home safety    Skin: intact where visible    Edema: bilateral lower extremity swelling    Hearing: Auditory  Auditory Impairment: None    Vision/Perceptual:                                     Range of Motion:  AROM: Within functional limits  PROM: Within functional limits                      Strength:  Strength: Within functional limits                Coordination: Tone & Sensation:  Tone: Normal                         Balance:  Sitting: Intact; Without support  Standing: Intact; With support    Functional Mobility and Transfers for ADLs:  Bed Mobility:  Scooting: Independent    Transfers:  Sit to Stand: Stand-by assistance  Stand to Sit: Stand-by assistance  Bathroom Mobility: Contact guard assistance  Toilet Transfer : Contact guard assistance  Assistive Device : Gait Belt;Walker, rollator    ADL Assessment:  Feeding: Independent    Oral Facial Hygiene/Grooming: Stand-by assistance    Bathing: Setup (infer from functional reach and LE access)    Upper Body Dressing: Setup (assist for line management)    Lower Body Dressing: Stand-by assistance    Toileting: Setup                ADL Intervention and task modifications:  Feeding  Feeding Assistance: Independent  Container Management: Independent  Food to Mouth: Independent  Drink to Mouth: Independent    Grooming  Position Performed: Standing  Washing Hands: Stand-by assistance              Upper Body [address]: Set-up    Lower Body Dressing Assistance  Underpants: Stand-by assistance  Leg Crossed Method Used: Yes  Position Performed: Standing (seated on toilet)  Cues: Verbal cues provided    Toileting  Bowel Hygiene: Set-up  Clothing Management: Stand-by assistance  Cues: Verbal cues provided    Cognitive Retraining  Safety/Judgement: Fall prevention;Home safety     Functional Measure:    Barthel Index:  Bathin  Bladder: 5  Bowels: 10  Groomin  Dressing: 10  Feeding: 10  Mobility: 10  Stairs: 5  Toilet Use: 5  Transfer (Bed to Chair and Back): 10  Total: 75/100      The Barthel ADL Index: Guidelines  1. The index should be used as a record of what a patient does, not as a record of what a patient could do. 2. The main aim is to establish degree of independence from any help, physical or verbal, however minor and for whatever reason. 3. The need for supervision renders the patient not independent. 4. A patient's performance should be established using the best available evidence. Asking the patient, friends/relatives and nurses are the usual sources, but direct observation and common sense are also important. However direct testing is not needed.   5. Usually the patient's performance over the preceding 24-48 hours is important, but occasionally longer periods will be relevant. 6. Middle categories imply that the patient supplies over 50 per cent of the effort. 7. Use of aids to be independent is allowed. Score Interpretation (from [facility])    Independent   60-79 Minimally independent   40-59 Partially dependent   20-39 Very dependent   <20 Totally dependent     -Corey Wyman., BarthelHAYDEE. (1965). Functional evaluation: the Barthel Index. [address] ([address] (1997). The Barthel activities of daily living index: self-reporting versus actual performance in the old (> or = 75 years). Journal of [address] [place] [address] 45(7), [facility], TORSTENF, Kalani Corley., Sanchez Lirianokner. (1999). Measuring the change in disability after inpatient rehabilitation; comparison of the responsiveness of the Barthel Index and Functional Bremer Measure. Journal of Neurology, Neurosurgery, and Psychiatry, 66(4), 402-197. Kevin Gillespie NSussyJDEEPAK, EDIE Stone, & Jorden Homans, MSussyA. (2004) Assessment of post-stroke quality of life in cost-effectiveness studies: The usefulness of the Barthel Index and the EuroQoL-5D.  Quality of Life Research, 15, 863-36     Occupational Therapy Evaluation Charge Determination   History Examination Decision-Making   LOW Complexity : Brief history review  LOW Complexity : 1-3 performance deficits relating to physical, cognitive , or psychosocial skils that result in activity limitations and / or participation restrictions  LOW Complexity : No comorbidities that affect functional and no verbal or physical assistance needed to complete eval tasks       Based on the above components, the patient evaluation is determined to be of the following complexity level: LOW   Pain Rating:  Patient complaints of back discomfort while sitting on commode, did not rate    Activity Tolerance:   Fair, desaturates with exertion and requires oxygen and requires rest breaks    After treatment patient left in no apparent distress:    Sitting in chair, Call bell within reach, Bed / chair alarm activated and Caregiver / family present    COMMUNICATION/EDUCATION:   The patients plan of care was discussed with: Registered nurse and Certified nursing assistant/patient care technician. Home safety education was provided and the patient/caregiver indicated understanding., Patient/family have participated as able in goal setting and plan of care. and Patient/family agree to work toward stated goals and plan of care. This patients plan of care is appropriate for delegation to [facility].     Thank you for this referral.  Coral Sullivan, OTR/L  Time Calculation: 37 mins

## 2022-01-14 NOTE — PROGRESS NOTES
Physical Therapy  1/14/2021    Pulse Oximetry Assessment    90% at rest on room air  84% while ambulating on room air  98% at rest on 2LPM  96% while ambulating on 2LPM      Thank You,  Paulette Andrea, PT, DPT

## 2022-01-14 NOTE — PROGRESS NOTES
0730: Bedside shift change report given to Oliver Haskins (oncoming nurse) by Dalia Campbell (offgoing nurse). Report included the following information SBAR, Kardex, ED Summary, Intake/Output, MAR and Recent Results. 1250: Pulse oximetry assessment   86% at rest on room air (if 88% or less, skip next steps)  96% at rest on 2LPM    1806: Pt off tele and IVs removed for discharge. I have reviewed discharge instructions with the caregiver. The caregiver verbalized understanding. Discharge medications reviewed with patient and caregiver and appropriate educational materials and side effects teaching were provided. Current Discharge Medication List      START taking these medications    Details   !! bumetanide (BUMEX) 1 mg tablet Take 1 Tablet by mouth daily for 30 days. Qty: 30 Tablet, Refills: 0  Start date: 1/14/2022, End date: 2/13/2022    Comments: Patient is to take 2mg bumex am and 1mg bumex pm daily for a total of 3mg daily      !! bumetanide (BUMEX) 1 mg tablet Take 2 Tablets by mouth daily for 30 days. Qty: 60 Tablet, Refills: 0  Start date: 1/14/2022, End date: 2/13/2022       !! - Potential duplicate medications found. Please discuss with provider. CONTINUE these medications which have NOT CHANGED    Details   albuterol (PROVENTIL HFA, VENTOLIN HFA, PROAIR HFA) 90 mcg/actuation inhaler Take 2 Puffs by inhalation every six (6) hours as needed for Wheezing. Qty: 18 g, Refills: 0      ondansetron hcl (Zofran) 4 mg tablet Take 1 Tablet by mouth every eight (8) hours as needed for Nausea or Vomiting. Qty: 14 Tablet, Refills: 0      acetaminophen (TYLENOL EXTRA STRENGTH) 500 mg tablet Take 500 mg by mouth every six (6) hours as needed for Pain. Cholecalciferol, Vitamin D3, (VITAMIN D3) 2,000 unit cap capsule Take 5,000 Units by mouth daily. tamsulosin (FLOMAX) 0.4 mg capsule Take 0.4 mg by mouth daily. metoprolol succinate (TOPROL-XL) 25 mg XL tablet Take 25 mg by mouth nightly.       SYNTHROID 150 mcg tablet 150 mcg daily. Refills: 0      escitalopram (LEXAPRO) 20 mg tablet Take 20 mg by mouth daily. ezetimibe (ZETIA) 10 mg tablet take 1 tablet by mouth once daily  Qty: 30 Tab, Refills: 1      !! bumetanide (BUMEX) 2 mg tablet Take 1 Tablet by mouth daily for 30 days. Qty: 30 Tablet, Refills: 0      colestipoL (COLESTID) 1 gram tablet Take 1 g by mouth two (2) times a day. apixaban (ELIQUIS) 2.5 mg tablet Take 2.5 mg by mouth two (2) times a day. colchicine 0.6 mg tablet Take 0.6 mg by mouth daily as needed. atorvastatin (LIPITOR) 20 mg tablet Take 20 mg by mouth daily. !! - Potential duplicate medications found. Please discuss with provider.

## 2022-01-14 NOTE — PROGRESS NOTES
Cardiology Progress Note                                        Admit Date: 1/12/2022    Assessment/Plan:     CHF; acute on chronic diastolic HF; improved; home Bumex dose should be 2 mg in am and 1 mg in pm  Permanent Afib; rate is controlled  S/p ICD; he is already scheduled to have a new generator on Tuesday at 6 am by Dr. Anita Chavez at [facility]; her last dose of Eliquis will be tomorrow am    Janie Wilder. is a 82 y.o. male with     PROBLEM LIST:  Patient Active Problem List    Diagnosis Date Noted    Near syncope 12/12/2021    Dizziness 12/12/2021    SOB (shortness of breath) 12/12/2021    CHF exacerbation ([address] 12/12/2021    Syncope, near 12/12/2021    Borderline low O2 saturation 12/12/2021    Thrombocytopenia ([address] 05/01/2014    HTN (hypertension) 04/29/2014    CKD (chronic kidney disease) stage 3, GFR 30-59 ml/min ([facility]) 04/29/2014    Hyperkalemia 04/29/2014    Respiratory failure, post-operative ([address] 04/29/2014    Anemia, blood loss 65/62/9521    Metabolic acidosis, normal anion gap (NAG) 04/29/2014    COPD (chronic obstructive pulmonary disease) ([address] 04/29/2014    SAMIR (obstructive sleep apnea) 04/29/2014    Adenocarcinoma of lung ([address] 04/29/2014    HEMALATHA (acute kidney injury) ([facility] 04/29/2014    Lung nodules 04/01/2014    Occlusion and stenosis of carotid artery without mention of cerebral infarction 03/22/2011    Cardiac pacemaker in situ 12/08/2010    Arrhythmia Atrial Flutter 11/02/2010    Bradycardia Sick Sinus Syndrome 11/02/2010    CAD (coronary artery disease), native coronary artery 11/02/2010    DM (Diabetes Mellitus-Adult Onset) 11/02/2010    First degree atrioventricular block 11/02/2010    Right bundle branch block and left anterior fascicular block 11/02/2010    Benign hypertensive heart disease without heart failure 11/02/2010    Paroxysmal atrial fibrillation ([address] 11/02/2010    Sleep apnea 11/02/2010         Subjective:     Janie Wilder. reports dyspnea, improved    Visit Vitals  BP (!) 110/49 (BP 1 Location: Left upper arm, BP Patient Position: Sitting)   Pulse 85   Temp 97.5 °F (36.4 °C)   Resp 26   Ht 5' 6.54\" (1.69 m)   Wt 200 lb 9.9 oz (91 kg)   SpO2 94%   BMI 31.86 kg/m²     No intake or output data in the 24 hours ending 01/14/22 0809    Objective:      Physical Exam:  HEENT: Perrla, EOMI  Neck: No JVD,  No thyroidmegaly  Resp: few  rales  CV: RRR s1s2 No murmur no s3  Abd:Soft, Nontender  Ext: No edema  Neuro: Alert and oriented; Nonfocal  Skin: Warm, Dry, Intact  Pulses: 2+ DP/PT/Rad      Telemetry: AFIB, V paced    Current Facility-Administered Medications   Medication Dose Route Frequency    sodium chloride (NS) flush 5-40 mL  5-40 mL IntraVENous Q8H    sodium chloride (NS) flush 5-40 mL  5-40 mL IntraVENous PRN    acetaminophen (TYLENOL) tablet 650 mg  650 mg Oral Q6H PRN    Or    acetaminophen (TYLENOL) suppository 650 mg  650 mg Rectal Q6H PRN    ondansetron (ZOFRAN ODT) tablet 4 mg  4 mg Oral Q8H PRN    Or    ondansetron (ZOFRAN) injection 4 mg  4 mg IntraVENous Q6H PRN    ezetimibe (ZETIA) tablet 10 mg  10 mg Oral DAILY    tamsulosin (FLOMAX) capsule 0.4 mg  0.4 mg Oral DAILY    levothyroxine (SYNTHROID) tablet 150 mcg  150 mcg Oral 6am    metoprolol succinate (TOPROL-XL) XL tablet 25 mg  25 mg Oral QHS    escitalopram oxalate (LEXAPRO) tablet 20 mg  20 mg Oral DAILY    colestipoL (COLESTID) tablet 1 g  1 g Oral BID    cholecalciferol (VITAMIN D3) (400 Units /10 mcg) tablet 2,000 Units  2,000 Units Oral DAILY    bumetanide (BUMEX) injection 2 mg  2 mg IntraVENous Q12H    arformoteroL (BROVANA) neb solution 15 mcg  15 mcg Nebulization BID RT    And    budesonide (PULMICORT) 500 mcg/2 ml nebulizer suspension  500 mcg Nebulization BID RT         Data Review:   Labs:    Recent Results (from the past 24 hour(s))   METABOLIC PANEL, BASIC    Collection Time: 01/13/22  4:34 PM   Result Value Ref Range    Sodium 140 136 - 145 mmol/L Potassium 4.4 3.5 - 5.1 mmol/L    Chloride 108 97 - 108 mmol/L    CO2 26 21 - 32 mmol/L    Anion gap 6 5 - 15 mmol/L    Glucose 129 (H) 65 - 100 mg/dL    BUN 75 (H) 6 - 20 MG/DL    Creatinine 3.55 (H) 0.70 - 1.30 MG/DL    BUN/Creatinine ratio 21 (H) 12 - 20      GFR est AA 20 (L) >60 ml/min/1.73m2    GFR est non-AA 17 (L) >60 ml/min/1.73m2    Calcium 9.3 8.5 - 10.1 MG/DL   CBC WITH AUTOMATED DIFF    Collection Time: 01/13/22  4:34 PM   Result Value Ref Range    WBC 7.4 4.1 - 11.1 K/uL    RBC 3.30 (L) 4.10 - 5.70 M/uL    HGB 10.1 (L) 12.1 - 17.0 g/dL    HCT 33.2 (L) 36.6 - 50.3 %    .6 (H) 80.0 - 99.0 FL    MCH 30.6 26.0 - 34.0 PG    MCHC 30.4 30.0 - 36.5 g/dL    RDW 14.6 (H) 11.5 - 14.5 %    PLATELET 971 (L) 452 - 400 K/uL    MPV 9.6 8.9 - 12.9 FL    NRBC 0.0 0  WBC    ABSOLUTE NRBC 0.00 0.00 - 0.01 K/uL    NEUTROPHILS 74 32 - 75 %    LYMPHOCYTES 17 12 - 49 %    MONOCYTES 7 5 - 13 %    EOSINOPHILS 1 0 - 7 %    BASOPHILS 0 0 - 1 %    IMMATURE GRANULOCYTES 1 (H) 0.0 - 0.5 %    ABS. NEUTROPHILS 5.5 1.8 - 8.0 K/UL    ABS. LYMPHOCYTES 1.3 0.8 - 3.5 K/UL    ABS. MONOCYTES 0.5 0.0 - 1.0 K/UL    ABS. EOSINOPHILS 0.1 0.0 - 0.4 K/UL    ABS. BASOPHILS 0.0 0.0 - 0.1 K/UL    ABS. IMM.  GRANS. 0.0 0.00 - 0.04 K/UL    DF AUTOMATED     METABOLIC PANEL, BASIC    Collection Time: 01/14/22  1:55 AM   Result Value Ref Range    Sodium 140 136 - 145 mmol/L    Potassium 4.4 3.5 - 5.1 mmol/L    Chloride 108 97 - 108 mmol/L    CO2 25 21 - 32 mmol/L    Anion gap 7 5 - 15 mmol/L    Glucose 105 (H) 65 - 100 mg/dL    BUN 76 (H) 6 - 20 MG/DL    Creatinine 3.48 (H) 0.70 - 1.30 MG/DL    BUN/Creatinine ratio 22 (H) 12 - 20      GFR est AA 20 (L) >60 ml/min/1.73m2    GFR est non-AA 17 (L) >60 ml/min/1.73m2    Calcium 8.7 8.5 - 10.1 MG/DL   MAGNESIUM    Collection Time: 01/14/22  1:55 AM   Result Value Ref Range    Magnesium 2.4 1.6 - 2.4 mg/dL   CBC W/O DIFF    Collection Time: 01/14/22  1:55 AM   Result Value Ref Range    WBC 6.4 4.1 - 11.1 K/uL    RBC 3.08 (L) 4.10 - 5.70 M/uL    HGB 9.3 (L) 12.1 - 17.0 g/dL    HCT 30.9 (L) 36.6 - 50.3 %    .3 (H) 80.0 - 99.0 FL    MCH 30.2 26.0 - 34.0 PG    MCHC 30.1 30.0 - 36.5 g/dL    RDW 14.4 11.5 - 14.5 %    PLATELET 250 (L) 183 - 400 K/uL    MPV 9.6 8.9 - 12.9 FL    NRBC 0.0 0  WBC    ABSOLUTE NRBC 0.00 0.00 - 0.01 K/uL

## 2022-01-14 NOTE — PROGRESS NOTES
[facility] referral placed for 2L O2/ VM left for [facility]  Requesting delivery before MD f/u appointment before Tuesday 1/18/22.

## 2022-01-14 NOTE — CONSULTS
[address]    Name:  Pako Bates  MR#:  [id number]  :  1935  ACCOUNT #:  [de-identified]  DATE OF SERVICE:  2022    RENAL CONSULTATION    REQUESTING PHYSICIAN:  Sunni Montilla MD    REASON FOR CONSULTATION:  For evaluation and management of renal failure. The patient was seen and examined. History obtained from the patient and daughter at the bedside, also from chart review. HISTORY OF PRESENT ILLNESS:  The patient is an 30-year-old male with past medical history significant for CHF, diabetes, hypertension, gout, sleep apnea, AFib, hypothyroidism, history of right lobectomy for lung cancer with subsequent diaphragmatic paresis, who presented with progressive worsening of shortness of breath. In the ED, he was hypoxic, requiring high-flow O2 and evidence of renal failure with BUN of 72 and a creatinine of 3.64 which is close to his baseline and a proBNP of 10,000. He has been diuresed and feeling better. The patient's daughter reports a detailed history with charting on a paper explaining how during his previous admission in 2021, he was given higher dose of Bumex for few days at 2 mg daily, to be continued as outpatient with subsequent tapering. His creatinine had jumped from 3.3-3.7 and Bumex was tapered down to 1 mg daily. Creatinine came down to 3.3 apparently. 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 He is followed by Dr. Kel Orozco as outpatient from our practice. He was felt to be a poor candidate for dialysis. He denies any recent nausea, vomiting or diarrhea, minimal cough. No fevers or chills. No chest pain. Cardiology has seen him. His Bumex has been increased and planned to be discharged today on 2 mg in a.m. and 1 mg in p.m. REVIEW OF SYSTEMS:  As noted above. Rest is negative.     Past Medical History:   Diagnosis Date    Arrhythmia     AFib    Arthritis     Atrial fibrillation ([address]     Cancer ([facility] [address]     right lung, skin cancer, bladder (instilled medication in bladder for tx)    Chronic kidney disease     Diabetes ([facility] [address]     NIDDM    Gout     Hypertension     Hypothyroidism     Ill-defined condition     neuropathy in feet    Other ill-defined conditions([address]99[address]89)     high cholesterol    Pacemaker     Psychiatric disorder     depression    Sleep apnea     uses CPAP     Past Surgical History:   Procedure Laterality Date    COLONOSCOPY N/A 2017    COLONOSCOPY performed by Dallas Gomes MD at hospitals ENDOSCOPY    COLONOSCOPY N/A 2019    COLONOSCOPY performed by Yulia Gaines MD at hospitals ENDOSCOPY    ECHO 2D ADULT  2009    LVH, LAE, mild MR, EF 55-60%    HX CATARACT REMOVAL Bilateral     with lens implants    HX HEART CATHETERIZATION      HX HEENT      tonsillectomy    HX KNEE ARTHROSCOPY Right     menisectomy    HX LUMBAR LAMINECTOMY      back surgery / hardware    HX OTHER SURGICAL  14    BRONCHOSCOPY/RIGHT VIDEO ASSISTED THORACOSCOPY,  2.RIGHT UPPER LOBE WEDGE RESECTION 3. COMPLETION RIGHT UPPER LOBECTOMY 4. MEDIASTINAL LYMPH NODE DISSECTION 5. INTERCOSTAL BLOCK    HX OTHER SURGICAL  14    Right Thoracoscopy, Ligation of inferior pulmonary ligament bleeding with prolene/clips    HX PACEMAKER Left     AICD--Dr. Shreyas Hart    HX UROLOGICAL      bladder tumor    [place] CARDIAC SURG PROCEDURE UNLIST      ablation,  Pacer/defibralator    STRESS TEST CARDIOLITE  2010    exercise/lexiscan study - poor exercise capacity, normal perfusion, EF 62%    THORACOSCOPY SURG WEDG RESEC LUNG  2015    RUL     No Known Allergies  Prior to Admission Medications   Prescriptions Last Dose Informant Patient Reported? Taking? Cholecalciferol, Vitamin D3, (VITAMIN D3) 2,000 unit cap capsule 2022 at Unknown time  Yes Yes   Sig: Take 5,000 Units by mouth daily. SYNTHROID 150 mcg tablet 2022 at Unknown time Self Yes Yes   Si mcg daily.    acetaminophen (TYLENOL EXTRA STRENGTH) 500 mg tablet 2022 at Unknown time  Yes Yes   Sig: Take 500 mg by mouth every six (6) hours as needed for Pain. albuterol (PROVENTIL HFA, VENTOLIN HFA, PROAIR HFA) 90 mcg/actuation inhaler 2021 at Unknown time  No Yes   Sig: Take 2 Puffs by inhalation every six (6) hours as needed for Wheezing. apixaban (ELIQUIS) 2.5 mg tablet 2022  Yes No   Sig: Take 2.5 mg by mouth two (2) times a day. atorvastatin (LIPITOR) 20 mg tablet 2022 Self Yes No   Sig: Take 20 mg by mouth daily. bumetanide (BUMEX) 2 mg tablet   No No   Sig: Take 1 Tablet by mouth daily for 30 days. Patient taking differently: Take 1 mg by mouth daily. colchicine 0.6 mg tablet Not Taking at Unknown time Self Yes No   Sig: Take 0.6 mg by mouth daily as needed. Patient not taking: Reported on 2021   colestipoL (COLESTID) 1 gram tablet Not Taking at Unknown time  Yes No   Sig: Take 1 g by mouth two (2) times a day. Patient not taking: Reported on 2022   escitalopram (LEXAPRO) 20 mg tablet 2022 at Unknown time Self Yes Yes   Sig: Take 20 mg by mouth daily. ezetimibe (ZETIA) 10 mg tablet 2022 at Unknown time Self No Yes   Sig: take 1 tablet by mouth once daily   metoprolol succinate (TOPROL-XL) 25 mg XL tablet 2022 at Unknown time Self Yes Yes   Sig: Take 25 mg by mouth nightly. ondansetron hcl (Zofran) 4 mg tablet 2021 at Unknown time  No Yes   Sig: Take 1 Tablet by mouth every eight (8) hours as needed for Nausea or Vomiting.   tamsulosin (FLOMAX) 0.4 mg capsule 2022 at Unknown time  Yes Yes   Sig: Take 0.4 mg by mouth daily. Facility-Administered Medications: None     Social History     Tobacco Use    Smoking status: Former Smoker     Packs/day: 2.50     Years: 25.00     Pack years: 62.50     Types: Cigarettes     Quit date: 1975     Years since quittin.6    Smokeless tobacco: Never Used    Tobacco comment: quit    Substance Use Topics    Alcohol use:  No Alcohol/week: 0.0 standard drinks    Drug use: No     Family History   Problem Relation Age of Onset   Hernandez Cancer Mother         pancreatic    Cancer Father         colon    Cancer Sister         uterine    Cancer Brother         lung, bladder         PHYSICAL EXAMINATION:  GENERAL:  Elderly frail male who is out of bed in chair in no acute distress. VITAL SIGNS:  He is afebrile. Pulse 85, BP  systolic, 18-69 diastolic, respiration of 20, saturating 95% on 2 liters nasal cannula. Weight is 91 kg, down from 95 kg on admit. His approximate dry weight as outpatient per daughter is around 204 pounds. He is down to 200 pounds. HEENT:  Head is atraumatic, normocephalic. Mucous membranes are moist.  No scleral icterus. NECK:  No JVD in the seated position. LUNGS:  Diminished breath sounds at bases, right greater than left. HEART:  Irregular rhythm. No tachycardia. ABDOMEN:  Soft, nontender, active bowel sounds. EXTREMITIES:  Trace peripheral edema. LABORATORY DATA:  CBC shows hemoglobin of 9.3, platelets of 900. UA last month has been pretty much planned except some protein of 100 on the dipstick, BUN 76, creatinine of 3.48 today compared to 3.64 on admit, baseline creatinine is around 3.3-3.5. Other labs as noted in HPI. ASSESSMENT:  1. Chronic kidney disease IV/V.  2.  Acute on chronic congestive heart failure with preserved ejection fraction. 3.  Borderline hypotension. 4.  Atrial fibrillation. 5.  Anemia with likely component of anemia of chronic kidney disease. 6.  History of aortic valve replacement. I had a long discussion with the patient and daughter. She had several questions about the dosing of Bumex,how to titrate as outpatient based on the weight, what to do to preserve the kidney function and whether he is a dialysis candidate or not. I counseled at length, explained that he has cardiorenal syndrome.   We will have to accept higher creatinine in order for him to breathe better. He may be pushed to stage V chronic kidney disease with increase in the Bumex, which should be okay to prevent recurrent admit to hospital for congestive heart failure. In addition, he need to minimize salt and avoid excessive fluid intake. Rate and rhythm control as possible with his AFib. In his advanced age, multiple comorbidities and borderline blood pressure, he likely is going to be a poor candidate for dialysis, but encouraged him to discuss further with Dr. Richardson Canseco about his goals of care. RECOMMENDATIONS:  1.  I agree with increasing Bumex to 2 mg in a.m. and 1 mg in p.m.  2.  Okay to accept higher creatinine in order for him to breathe better and stay out of hospital.  3.  Limit salt and fluid intake. 4.  Continue current treatment. 5.  Keep followup appointment with Dr. Richardson Canseco from before. Discussed with the patient and daughter at length.       Mary Edmonds MD      [id number]  D:  01/14/2022 15:02  T:  01/14/2022 18:47  JOB #:  [id number]

## 2022-01-14 NOTE — ROUTINE PROCESS
TRANSFER - OUT REPORT:    Verbal report given to Taylor(name) on [facility].  being transferred to CVSU(unit) for routine progression of care       Report consisted of patients Situation, Background, Assessment and   Recommendations(SBAR). Information from the following report(s) SBAR, ED Summary, Procedure Summary, MAR and Recent Results was reviewed with the receiving nurse. Lines:   Peripheral IV 01/12/22 (Active)       Peripheral IV 01/13/22 Right Antecubital (Active)   Site Assessment Clean, dry, & intact 01/13/22 1913   Phlebitis Assessment 0 01/13/22 1913   Infiltration Assessment 0 01/13/22 1913   Dressing Status Clean, dry, & intact 01/13/22 1913   Dressing Type Transparent 01/13/22 1913   Hub Color/Line Status Pink 01/13/22 1913        Opportunity for questions and clarification was provided.       Patient transported with:   [employer]

## 2022-01-14 NOTE — PROGRESS NOTES
Attempted to schedule hospital follow up PCP appointment. Awaiting call back from the office with appointment information. Pending patient discharge. Jigar Echols, Care Management Specialist.     Received call back.   Hospital follow-up PCP transitional care appointment has been scheduled with Dr. Daily Huang for Monday, 1/17/22 at 3:45 p.m. Jigar Echols, Care Management Specialist.

## 2022-01-14 NOTE — DISCHARGE SUMMARY
Discharge Summary       PATIENT ID: Sofi Mc MRN: [id number]   YOB: 1935    DATE OF ADMISSION: 1/12/2022  9:51 AM    DATE OF DISCHARGE: 01/14/22   PRIMARY CARE PROVIDER: Ramírez Yao MD     ATTENDING PHYSICIAN: Umu Hyman MD  DISCHARGING PROVIDER: Umu Hyman MD    To contact this individual call [phone] and ask the  to page. If unavailable ask to be transferred the Adult Hospitalist Department. CONSULTATIONS: IP CONSULT TO CARDIOLOGY    PROCEDURES/SURGERIES: * No surgery found *    ADMITTING DIAGNOSES & HOSPITAL COURSE:   HPI: Yary Chow Jr. is a 82 y. o. male past medical history of atrial fibrillation, CKD, DM 2, gout, hypertension, depression, sleep apnea on cpap, and hypothyroidism who presents as a transfer from [address] [place] [address] for dyspnea, and hypoxia.  Admitted for acute on chronic heart failure.  He endorses worsening dyspnea for the past 6 months, and presented today because he was having dyspnea at rest.  Associated symptoms include orthopnea.  He was last hospitalized December 12 through December 17th for acute on chronic heart failure.  He was diuresed, and discharged in stable condition. Gracia Lee does have a history of right lobectomy for lung cancer with subsequent diaphragmatic paresis which was thought to also contribute to his dyspnea.  Started on Trelegy inhaler at discharge by pulmonology.     In the ED, he was hypoxic with improvement to 92% on 2 L nasal cannula.  Significant for BUN 72, creatinine 3.64 which is near his baseline, and probnp 10,060.      In the ED, he was given IV Lasix 40 mg. Gracia Lee states that he has urinated, but cannot quantify how much. \"    Patient was managed for acute on chronic exacerbation of HFpEF class II with a recent echo done in December 2021 with an EF of 60 to 65%. Patient was continued on Bumex for IV diuresis.   Evaluated by cardiology here, recommended Bumex 2 mg p.o. a.m., 1 mg p.m. on discharge for a total of 3 mg daily. Interrogation of patient's ICD was ordered, cardiology stated new generator is planned to be replaced this coming Tuesday at 11 AM.  Patient initially had mild HEMALATHA on CKD stage IV this is improved with diuresis. Patient follows with Dr. Batool Oro at [address] [facility] [address] outpatient. Outpatient appointment already scheduled. Patient required home O2 on discharge, desat test done 4L NC on d/c. Directed patient to follow-up with PCP, cardiology and nephrology outpatient.         DISCHARGE DIAGNOSES / PLAN:        #Acute on chronic hFpEF  Class II (echo 12/2021 with EF 60-65%)  AHRF 2/2 to above  #hx AVR  -patient desatted to mid [de-identified] on RA stable on 4L NC    -s/p Lasix IV 40 mg x 1 dose  -Strict I&O, and daily weight   Continue to diurese with Bumex > 2mg PO am and 1mg PO PM on d.c   -consulted cardiology , saw Dr. Magali Byrd last time   f/u with [facility] Dr. Batool Oro this coming Tuesday for generator change of ICD        #Hypertension   Continue metoprolol     #Hypothyroidism   Continue home levothyroxine     #Dyslipidemia   Continue Zetia and Colestid     FEN: cardiac  dvt ppx: lovenox  MPOA: Renée Corley (daughter)  Code: DNR     Spoke to daughter gave her update and answered questions.     Care Plan discussed with: Patient/Family and Nurse  Anticipated Disposition:  PT, OT, RN  Anticipated Discharge: 24 hours to 48 hours           FOLLOW UP APPOINTMENTS:    Follow-up Information     Follow up With Specialties Details Why Contact Info    Linn Rutledge MD Family Medicine On 1/17/2022 Hospital follow up primary care appointment Monday, 1/17/22 at 3:45 p.m.  [address] [address] [phone]      Brian Hogan MD Cardio Vascular Surgery, Cardiology, Interventional Cardiology In 1 week  [facility]  [address]  [phone]             ADDITIONAL CARE RECOMMENDATIONS: Repeat CBC, BMP 3 to 5 days    DIET: Cardiac Diet    ACTIVITY: Activity as tolerated          DISCHARGE MEDICATIONS:  Current Discharge Medication List      START taking these medications    Details   !! bumetanide (BUMEX) 1 mg tablet Take 1 Tablet by mouth daily for 30 days. Qty: 30 Tablet, Refills: 0  Start date: 1/14/2022, End date: 2/13/2022    Comments: Patient is to take 2mg bumex am and 1mg bumex pm daily for a total of 3mg daily      !! bumetanide (BUMEX) 1 mg tablet Take 2 Tablets by mouth daily for 30 days. Qty: 60 Tablet, Refills: 0  Start date: 1/14/2022, End date: 2/13/2022       !! - Potential duplicate medications found. Please discuss with provider. CONTINUE these medications which have NOT CHANGED    Details   albuterol (PROVENTIL HFA, VENTOLIN HFA, PROAIR HFA) 90 mcg/actuation inhaler Take 2 Puffs by inhalation every six (6) hours as needed for Wheezing. Qty: 18 g, Refills: 0      ondansetron hcl (Zofran) 4 mg tablet Take 1 Tablet by mouth every eight (8) hours as needed for Nausea or Vomiting. Qty: 14 Tablet, Refills: 0      acetaminophen (TYLENOL EXTRA STRENGTH) 500 mg tablet Take 500 mg by mouth every six (6) hours as needed for Pain. Cholecalciferol, Vitamin D3, (VITAMIN D3) 2,000 unit cap capsule Take 5,000 Units by mouth daily. tamsulosin (FLOMAX) 0.4 mg capsule Take 0.4 mg by mouth daily. metoprolol succinate (TOPROL-XL) 25 mg XL tablet Take 25 mg by mouth nightly. SYNTHROID 150 mcg tablet 150 mcg daily. Refills: 0      escitalopram (LEXAPRO) 20 mg tablet Take 20 mg by mouth daily. ezetimibe (ZETIA) 10 mg tablet take 1 tablet by mouth once daily  Qty: 30 Tab, Refills: 1      !! bumetanide (BUMEX) 2 mg tablet Take 1 Tablet by mouth daily for 30 days. Qty: 30 Tablet, Refills: 0      colestipoL (COLESTID) 1 gram tablet Take 1 g by mouth two (2) times a day. apixaban (ELIQUIS) 2.5 mg tablet Take 2.5 mg by mouth two (2) times a day. colchicine 0.6 mg tablet Take 0.6 mg by mouth daily as needed.       atorvastatin (LIPITOR) 20 mg tablet Take 20 mg by mouth daily. !! - Potential duplicate medications found. Please discuss with provider. NOTIFY YOUR PHYSICIAN FOR ANY OF THE FOLLOWING:   Fever over 101 degrees for 24 hours. Chest pain, shortness of breath, fever, chills, nausea, vomiting, diarrhea, change in mentation, falling, weakness, bleeding. Severe pain or pain not relieved by medications. Or, any other signs or symptoms that you may have questions about. DISPOSITION:    Home With:   OT  PT x HH  RN       Long term SNF/Inpatient Rehab    Independent/assisted living    Hospice    Other:       PATIENT CONDITION AT DISCHARGE:     Functional status    Poor    x Deconditioned     Independent      Cognition     Lucid    x Forgetful     Dementia        Code status     Full code    x DNR      PHYSICAL EXAMINATION AT DISCHARGE:  I had a face to face encounter with this patient and independently examined them on 1/14/2022 as outlined below:                                                   Constitutional:  No acute distress, cooperative, pleasant    ENT:  Oral mucosa moist, oropharynx benign. Resp:  faint crackles b/l. No wheezing/rhonchi/rales. No accessory muscle use   CV:  Regular rhythm, normal rate, no murmurs, gallops, rubs    GI:  Soft, non distended, non tender. normoactive bowel sounds, no hepatosplenomegaly     Musculoskeletal:  No edema, warm, 2+ pulses throughout    Neurologic:  Moves all extremities.   AAOx3, CN II-XII reviewed            CHRONIC MEDICAL DIAGNOSES:  Problem List as of 1/14/2022 Date Reviewed: 2/22/2019          Codes Class Noted - Resolved    Near syncope ICD-10-CM: R55  ICD-9-CM: 780.2  12/12/2021 - Present        Dizziness ICD-10-CM: S21  ICD-9-CM: 780.4  12/12/2021 - Present        SOB (shortness of breath) ICD-10-CM: R06.02  ICD-9-CM: 786.05  12/12/2021 - Present        CHF exacerbation ([facility] [address] ICD-10-CM: I50.9  ICD-9-CM: 428.0  12/12/2021 - Present        Syncope, near ICD-10-CM: R55  ICD-9-CM: 780.2  12/12/2021 - Present        Borderline low O2 saturation ICD-10-CM: R79.81  ICD-9-CM: 790.91  12/12/2021 - Present        Thrombocytopenia (HCC) ICD-10-CM: D69.6  ICD-9-CM: 287.5  5/1/2014 - Present        HTN (hypertension) (Chronic) ICD-10-CM: I10  ICD-9-CM: 401.9  4/29/2014 - Present        CKD (chronic kidney disease) stage 3, GFR 30-59 ml/min (HCC) (Chronic) ICD-10-CM: N18.30  ICD-9-CM: 585.3  4/29/2014 - Present        Hyperkalemia (Chronic) ICD-10-CM: E87.5  ICD-9-CM: 276.7  4/29/2014 - Present        Respiratory failure, post-operative ([facility] ICD-10-CM: A93.468  ICD-9-CM: 518.51  4/29/2014 - Present        Anemia, blood loss ICD-10-CM: D50.0  ICD-9-CM: 280.0  4/29/2014 - Present        Metabolic acidosis, normal anion gap (NAG) ICD-10-CM: E87.2  ICD-9-CM: 276.2  4/29/2014 - Present        COPD (chronic obstructive pulmonary disease) (HCC) ICD-10-CM: J44.9  ICD-9-CM: 496  4/29/2014 - Present        SAMIR (obstructive sleep apnea) (Chronic) ICD-10-CM: G47.33  ICD-9-CM: 327.23  4/29/2014 - Present        Adenocarcinoma of lung ([facility] ICD-10-CM: C34.90  ICD-9-CM: 162.9  4/29/2014 - Present    Overview Signed 4/29/2014 10:47 AM by Reilly Benson     1. BRONCHOSCOPY/RIGHT VIDEO ASSISTED THORACOSCOPY, 2.RIGHT UPPER LOBE WEDGE RESECTION 3. COMPLETION RIGHT UPPER LOBECTOMY 4. MEDIASTINAL LYMPH NODE DISSECTION 5. INTERCOSTAL BLOCK 4/28/2014.    1. Right Thoracoscopy   2. Ligation of inferior pulmonary ligament bleeding with prolene/clips 4/28/2014.                HEMALATHA (acute kidney injury) ([facility] ICD-10-CM: N17.9  ICD-9-CM: 584.9  4/29/2014 - Present        Lung nodules ICD-10-CM: R91.8  ICD-9-CM: 793.19  4/1/2014 - Present        Occlusion and stenosis of carotid artery without mention of cerebral infarction ICD-10-CM: I65.29  ICD-9-CM: 433.10  3/22/2011 - Present        Cardiac pacemaker in situ ICD-10-CM: Z95.0  ICD-9-CM: V45.01  12/8/2010 - Present        Arrhythmia Atrial Flutter ICD-10-CM: I48.92  ICD-9-CM: 427.32  11/2/2010 - Present    Overview Signed 3/22/2011  2:34 PM by Shubham Sanchez MD     Ablation 2006             Bradycardia Sick Sinus Syndrome ICD-10-CM: I49.5  ICD-9-CM: 427.81  11/2/2010 - Present        CAD (coronary artery disease), native coronary artery ICD-10-CM: I25.10  ICD-9-CM: 414.01  11/2/2010 - Present    Overview Signed 3/22/2011  2:35 PM by Shubham Sanchez MD     Nonobstructive disease by cath 2004  - cath 2004 LAD 50%, RCA 50%, EF 45%             DM (Diabetes Mellitus-Adult Onset) (Chronic) ICD-10-CM: E11.9  ICD-9-CM: 250.00  11/2/2010 - Present        First degree atrioventricular block ICD-10-CM: I44.0  ICD-9-CM: 426.11  11/2/2010 - Present        Right bundle branch block and left anterior fascicular block ICD-10-CM: I45.2  ICD-9-CM: 426.52  11/2/2010 - Present        Benign hypertensive heart disease without heart failure ICD-10-CM: I11.9  ICD-9-CM: 402.10  11/2/2010 - Present        Paroxysmal atrial fibrillation ([address] ICD-10-CM: I48.0  ICD-9-CM: 427.31  11/2/2010 - Present    Overview Signed 3/22/2011  2:34 PM by Shubham Sanchez MD     Onset 1999  Sinus node dysfunction/bifascicular block   - dual chamber pacer 2010             Sleep apnea ICD-10-CM: G47.30  ICD-9-CM: 780.57  11/2/2010 - Present    Overview Signed 3/22/2011  2:35 PM by Shubham Sanchez MD     Severe SAMIR  On CPAP ([place])                   Greater than 30 minutes were spent with the patient on counseling and coordination of care    Signed:   Oliver Mcghee MD  1/14/2022  1:12 PM

## 2022-01-14 NOTE — PROGRESS NOTES
CM received page from [place] [id number] of need for smaller O2 tank to attach to patient's RW (lives in [place]/ [facility]). Contact made w/ Daughter Alfonso Chavez [phone] expressed patient is unable to safely walk w/ current tank and RW and the need for 2L tank. CM noted PT participation on 2L tolerated. Spoke w/ [facility] will need new O2 order and Provider will deliver Monday/Tuesday . Updates provided to Alfonso Hiltonal and asked to communicate w/ facility staff regarding meals to be brought to patient room. This writer asked daughter to utilize [facility] number to request updates regarding O2 delivery next week. Discussed case w/ Floyd Kaushik - order received and informed CM patient has MD appointment f/u on Tuesday 1/18 and will need appropriate O2 tank for trip. CM will send electronic update to provider.

## 2022-01-17 NOTE — PROGRESS NOTES
Goals      Attends follow-up appointments as directed. 12/20/21  CTN reviewed the following follow up appts recommeded at discharge    PCP----daughter reports that she is meeting with patient's [place] MD today to see if she wants to switch PCP. CTN will followup for updates    zaida Whelan--pt has appt with Scooter Reyesjose 12/29 at 2:30, daughter will take pt to appt    Dispatch health--per pt daughter,patient [facility] informed her that [facility] is not contracted to see patients there. She will have patient seen by [facility] wellness staff. Sharyn Rayor PT--daughter states hospitalist wrote script to have pt seen at [facility] [place] in their gym    Patient has nephrologist that he sees every 3 months, Dr Tamar Kline. Daughter will call the office to inform them of increase in bumex. Pt has an appt on 1/7/2022    [facility] will notify office of hospital d/c to see if they want pt to come in.    ---mkrw    CTN unable to reach pt daughterJEFF on VM. CTN did contact patient and the wellness center at patient residence. Patient states that he is no longer seeing Dr Agnieszka Patterson and is seeing So Wiley NP at [facility]. CTN contacted the [facility], nurse states patient has appt to see NP So Wiley today. Patient reports he will continue to work with Caitlin Santillan PT now that he has retrned home however he states this may change once he has his pacer/AICD changed out tomorrow depending on activity orders. CTN will follow up with patient daughter regarding follow ups with Dr Mary Kate cerda and Dr Tamar Kline, nephrology. Per chart, pt has appt for AICD replacement tomorrow at [facility]. Patient states he has home 02 from [facility] in his apt.---mkrw         Understands red flags post discharge.       12/20/21  CTN spoke to patient daughter Selin Boykin to review discharge instructions/red flags     Daily weights, daughter reports she purchased a digital scale over the weekend and instructed pt to weigh every day in the morning. When CTN contacted pt he had not gotten OOB yet to weigh. [address] states she will go to his apt today to get baseline and record weight on chart. Last hospital weight 210lbs   Low sodium diet 1500 mg. Daughter states patient is aware of low sodium diet and knows what he should avoid   Medications: daughter endorses that she picked up new meds and incorporated them into his pill box. She states she fills the pill box weekly and that pt is compliant   Monitor VS and 02 sats---daughter states she will talk to facility to see if they can start taking pt vitals daily. Pt has a BP cuff but is unable to take BP himself. CTN will follow up next week for updates--mkrw    01/12/22  CTN reviewed chart prior to call attempt--patient is currently at [facility] ED. CTN will follow up tomorrow---mkrw    01/17/22  CTN spoke to patient for updates. He reports he is wearing 02 at 2L and used pulse ox to tell CTN his o2 sats \"are in the 90's and my HR is 80\". Patient reports that he does not feel SOB, does not have cough. Denies CP. Patient reports he is weighing himself and reports weight at 200 lbs. This is 10 lb decrease from call made previously to patient. He denies any edema or SOB. Patient reports his daughter takes care of his medicines, LM on daughter VM. Unable to perform med rec. CTN instructed patient to call his daughter or the Wellness center if he start getting swelling in his feet/legs, has more difficulty with breathing, CP. Patient states he will. CTN will follow up after pacemaker insertion discharge. --mkrw

## 2022-01-18 NOTE — ANESTHESIA PREPROCEDURE EVALUATION
Anesthetic History   No history of anesthetic complications            Review of Systems / Medical History  Patient summary reviewed, nursing notes reviewed and pertinent labs reviewed    Pulmonary    COPD    Sleep apnea: CPAP  Shortness of breath      Comments: Former smoker - Quit 1975 - 62.5 pack years    Hx Lung Cancer s/p Right VATS with right upper lobe wedge resection, right upper lobectomy, and mediastinal lymph node dissection (4/28/14)   Neuro/Psych         Psychiatric history (Depression)  Pertinent negatives: Headaches: /13/21):  Comments: Neuropathy    Hx Lumbar laminectomy and fusion Cardiovascular    Hypertension      CHF  Dysrhythmias (SSS) : atrial fibrillation and atrial flutter  Pacemaker (AICD 2010 for A-fib/flutter), CAD and hyperlipidemia    Exercise tolerance: <4 METS  Comments: AICD at end of battery life (1/18/22)    S/P AVR    Sick Sinus Syndrome     ECG (1/12/22): Ventricular-paced rhythm   Biventricular pacemaker detected   Abnormal ECG   When compared with ECG of 12-DEC-2021 10:54,   Vent. rate has decreased BY   2 BPM     TTE (12/13/21):    Technical qualifiers: Echo study was technically difficult due to low parasternal window. Limited apical views    Left Ventricle: Not well visualized. Left ventricle size is normal. Normal wall thickness. Normal left ventricular systolic function with a visually estimated EF of 60 - 65%.   Right Ventricle: Not well visualized.   Aortic Valve: Bioprosthetic valve. Max/mean gradient 20/11 mmHg. No significant regurgitation    Left Atrium: Left atrium is severely dilated. GI/Hepatic/Renal         Renal disease (CKD): CRI      Comments: Hx of cyst and pseudocyst of pancreas  Fam hx of malignant neoplasm of GI Tract Endo/Other    Diabetes: type 2  Hypothyroidism  Obesity, arthritis and anemia (Hgb = 9.3 on 1/14/22)  Pertinent negatives: Cancer: Rt lung Ca with RULobectomy 2014. Comments:  Thrombocytopenia (Platelets = 573F on 6/41/28)    Hx Lung Cancer s/p Right VATS with right upper lobe wedge resection, right upper lobectomy, and mediastinal lymph node dissection (4/28/14)    Hx Bladder Cancer s/p chemotherapy Other Findings   Comments: Gout         Physical Exam    Airway  Mallampati: II  TM Distance: 4 - 6 cm  Neck ROM: normal range of motion   Mouth opening: Normal     Cardiovascular  Regular rate and rhythm,  S1 and S2 normal,  no murmur, click, rub, or gallop             Dental    Dentition: Lower partial plate     Pulmonary  Breath sounds clear to auscultation               Abdominal  GI exam deferred       Other Findings            Anesthetic Plan    ASA: 4  Anesthesia type: MAC and total IV anesthesia          Induction: Intravenous  Anesthetic plan and risks discussed with: Patient

## 2022-01-18 NOTE — PROGRESS NOTES
Discharge instructions reviewed with patient and daughter. No changes to medications. Went over restrictions and precautions. Left upper chest incision is c/d/i, no swelling.      1645: IV removed, patient dressed, taken down in personal wheelchair by RN

## 2022-01-18 NOTE — PROGRESS NOTES
Primary Nurse Gerhard Rivera and WIL kaur performed a dual skin assessment on this patient No impairment noted  Bebo score is 23

## 2022-01-18 NOTE — PROGRESS NOTES
Cardiac Cath Lab Recovery Arrival Note:      Eric Nuñez arrived to Cardiac Cath Lab, Recovery Area. Staff introduced to patient. Patient identifiers verified with NAME and DATE OF BIRTH. Procedure verified with patient. Consent forms reviewed and signed by patient or authorized representative and verified. Allergies verified. Patient and family oriented to department. Patient and family informed of procedure and plan of care. Questions answered with review. Patient prepped for procedure, per orders from physician, prior to arrival.    Patient on cardiac monitor, non-invasive blood pressure, SPO2 monitor. On 2 liters nc home dose. Patient is A&Ox 4. Patient reports no complaints. Patient in stretcher, in low position, with side rails up, call bell within reach, patient instructed to call if assistance as needed. Patient prep in: [address], [place]. Patient family has pager # none  Family in: Umer Philip.    Prep by: Amandeep Holcomb

## 2022-01-18 NOTE — PROGRESS NOTES
S/p BIV-ICD gen change at the left chest, no complications. Tyrx envelope used. He is currently deconditioned and possibly depressed. He has had inactivity for weeks. Lost his ability to ambulate safely. Would benefit from PT which I think is being arranged. In light that he may find improvement in his situation, I felt OK keeping the same arrangement as prior regarding his device implant. DNR but with ICD therapy enabled. He will notify me if he wants ICD therapy disabled. I answered his and his daughter's questions. Home today.

## 2022-01-18 NOTE — DISCHARGE INSTRUCTIONS
DISCHARGE INSTRUCTIONS FOR PATIENTS WITH ICD'S    You had a [employer] BIV-ICD generator change due to battery depletion with Dr. Lauren Ruggiero on 1/18. Though you have a DNR, the defibrillator function is programmed \"on\" as it was prior to this visit. If at any point you desire the defibrillator function to be programmed \"off\", contact Dr. Sailaja Bishop office. 1. Remember to call for an appointment in 2-4 weeks [phone] to check healing and implant programming with Dr. Sailaja Bishop nurse, [facility]. 2. Medic Alert Bracelets are available from your pharmacist to wear at all times if you choose to wear one. 3. Carry your ID card for your ICD with you at all times. This card will be given to you in the hospital or mailed to you. 4. The ICD will bulge slightly under your skin. The bulge will decrease in size over the next few weeks. Please notify the doctor's office if you notice any of the following around your ICD site:   A.  A bruise that does not go away. B.  Soreness or yellow, green, or brown drainage from the site. C. Any swelling from the site. D. If you have a fever of 100 degrees or higher that lasts for a few days. INCISION CARE     1.  Leave the skin glue over your site until it dissolves on its own, usually in a few weeks. 2.  You may shower after 3 days as long as your incision isnt submerged or directly sprayed upon until well healed. 3.  For comfort, wear loose fitting clothing. 4.  Report any signs of infection, fever, pain, swelling, redness, oozing, or heat at site especially if these symptoms increase after the first 3 to 4 days. ACTIVITY PRECAUTIONS   1. Avoid rough contact with the implant site. 2. No driving for 14 days. 3. Avoid lifting your arm over your head, carrying anything on the affected side, or lifting over 10 pounds for 30 days. For the first 2 days only bend your arm at the elbow.   4. Any extreme activity such as golf, weight lifting or exercise biking should be restricted for 60 days. 5. Do not carry objects by holding them against your implant site. 6.  No shooting rifles or any type of gun with the affected shoulder permanently. 7.  Welding and chainsaws are prohibited. SPECIAL PRECAUTIONS  1. You should avoid all strong magnetic fields, such as arc welding, large transformers, large motors. Some ICD devices will beep if it detects a strong magnet. If this occurs, move out of the area. 2.  You may not have an MRI which uses a strong magnet to take pictures (there is a capped RV pacemaker lead that is abandoned, not hooked up the current unit). 3.  Treatments or surgery that requires diathermy or electrocautery should be discussed with your doctor before scheduled. 4.  Avoid radio frequency transmitters, including radar. 5.  Advise dentist or other medical personnel you see that you have an ICD. 6.  Cell phones and microwave oven use is okay. 7.  If you plan to move or take a trip to a new area, the doctor's office will give you a name of a doctor to contact for any problems. SPECIAL INSTRUCTIONS ON SHOCKS   1. Notify your doctor for any of the following:       A. Anytime a shock is received in a 24 hour period. An office visit is not usually required for a single shock. B.  Two or more shocks in a row. If you do not feel well, call the [facility], otherwise call your doctor. This may require an office visit. C. Two or more shocks spaced apart by several hours. This may require an office visit. 2.  Keep a record of events. Include date, time, symptoms and activity at that time. ANTIBIOTIC THERAPY  During the first 8 weeks after your ICD insertion, you may need antibiotics before any dental work or certain tests or operations. Let the dentist or doctor who is caring for you know that you have had an implanted device.     Signed By: Mady Davis MD     January 18, 2022

## 2022-01-18 NOTE — Clinical Note
The physician has been notified. I discussed all the results of the workup performed in ER today and advised the patient on outpatient management. Patient expresses full understanding and agrees to follow up with PMD. Patient was seen and examined at bedside. Reports feeling much better after medications. Wants to be discharged home. Patient appears to be improved.

## 2022-01-20 NOTE — ANESTHESIA POSTPROCEDURE EVALUATION
Procedure(s):  REMOVE & REPLACE ICD BIV MULTI LEADS. MAC, total IV anesthesia    Anesthesia Post Evaluation        Patient location during evaluation: PACU  Note status: Adequate. Level of consciousness: responsive to verbal stimuli and sleepy but conscious  Pain management: satisfactory to patient  Airway patency: patent  Anesthetic complications: no  Cardiovascular status: acceptable  Respiratory status: acceptable  Hydration status: acceptable  Comments: +Post-Anesthesia Evaluation and Assessment    Patient: Bess Smallwood MRN: [id number]  SSN: [id number]   YOB: 1935  Age: 82 y.o. Sex: male      Cardiovascular Function/Vital Signs    /64   Pulse 81   Temp 36.4 °C (97.6 °F)   Resp 16   Ht 5' 7\" (1.702 m)   Wt 88 kg (194 lb)   SpO2 98%   BMI 30.38 kg/m²     Patient is status post Procedure(s):  REMOVE & REPLACE ICD BIV MULTI LEADS. Nausea/Vomiting: Controlled. Postoperative hydration reviewed and adequate. Pain:  Pain Scale 1: Numeric (0 - 10) (01/18/22 1615)  Pain Intensity 1: 0 (01/18/22 1615)   Managed. Neurological Status: At baseline. Mental Status and Level of Consciousness: Arousable. Pulmonary Status:   O2 Device: Nasal cannula (01/18/22 1615)   Adequate oxygenation and airway patent. Complications related to anesthesia: None    Post-anesthesia assessment completed. No concerns. Signed By: Vickey Bettencuort MD    1/20/2022  Post anesthesia nausea and vomiting:  controlled      INITIAL Post-op Vital signs:   Vitals Value Taken Time   /64 01/18/22 1615   Temp 36.4 °C (97.6 °F) 01/18/22 1503   Pulse 80 01/18/22 1629   Resp 22 01/18/22 1629   SpO2 99 % 01/18/22 1629   Vitals shown include unvalidated device data.

## 2022-01-25 NOTE — PROGRESS NOTES
Goals      Attends follow-up appointments as directed. 12/20/21  CTN reviewed the following follow up appts recommeded at discharge    PCP----daughter reports that she is meeting with patient's [facility] MD today to see if she wants to switch PCP. CTN will followup for updates    Dr Janeth Davis, zaida--pt has appt with Jaskaran Damon 12/29 at 2:30, daughter will take pt to appt    Dispatch health--per pt daughter,patient [facility] informed her that [facility] is not contracted to see patients there. She will have patient seen by [facility] wellness staff. Gia Arango PT--daughter states hospitalist wrote script to have pt seen at [facility] in their gym    Patient has nephrologist that he sees every 3 months, Dr Zoltan Piedra. Daughter will call the office to inform them of increase in bumex. Pt has an appt on 1/7/2022    [facility] notify office of hospital d/c to see if they want pt to come in.    ---mkrw    CTN unable to reach pt daughterJEFF on VM. CTN did contact patient and the wellness center at patient residence. Patient states that he is no longer seeing Dr Marcos Geronimo and is seeing Adrián Quintero NP at [facility]. CTN contacted the [facility], nurse states patient has appt to see NP Adrián Quintero today. Patient reports he will continue to work with Jessee [facility] now that he has returned home however he states this may change once he has his pacer/AICD changed out tomorrow depending on activity orders. CTN will follow up with patient daughter regarding follow ups with Dr Janeth cerda and Dr Zoltan Piedra, nephrology. Per chart, pt has appt for AICD replacement tomorrow at [facility]. Patient states he has home 02 from [facility] in his apt.---mkrw    01/25/22  CTN spoke to pt daughter Mayur Grijalva briefly. She report patient saw Dr Zoltan Piedra and patient is going today to have US of kidneys performed to r/o blockage as patient has stopped urinating.     Patient also had AICD battery changed on 1/18/22. Monica Tyler also stated that they are talking with hospice, CTN was unable to gather more information as daughter needed to end call to take patient to US appt at [facility]. CTN will follow up in 1 week. --mkrw         Understands red flags post discharge. 12/20/21  CTN spoke to patient daughter Monica Tyler to review discharge instructions/red flags     Daily weights, daughter reports she purchased a digital scale over the weekend and instructed pt to weigh every day in the morning. When CTN contacted pt he had not gotten OOB yet to weigh. Monica Tyler states she will go to his apt today to get baseline and record weight on chart. Last hospital weight 210lbs   Low sodium diet 1500 mg. Daughter states patient is aware of low sodium diet and knows what he should avoid   Medications: daughter endorses that she picked up new meds and incorporated them into his pill box. She states she fills the pill box weekly and that pt is compliant   Monitor VS and 02 sats---daughter states she will talk to facility to see if they can start taking pt vitals daily. Pt has a BP cuff but is unable to take BP himself. CTN will follow up next week for updates--mkrw    01/12/22  CTN reviewed chart prior to call attempt--patient is currently at [facility] ED. CTN will follow up tomorrow---mkrw    01/17/22  CTN spoke to patient for updates. He reports he is wearing 02 at 2L and used pulse ox to tell CTN his o2 sats \"are in the 90's and my HR is 80\". Patient reports that he does not feel SOB, does not have cough. Denies CP. Patient reports he is weighing himself and reports weight at 200 lbs. This is 10 lb decrease from call made previously to patient. He denies any edema or SOB. Patient reports his daughter takes care of his medicines, LM on daughter VM. Unable to perform med rec. CTN instructed patient to call his daughter or the Wellness center if he start getting swelling in his feet/legs, has more difficulty with breathing, CP.  Patient states he will. CTN will follow up after pacemaker insertion discharge. --kimberley    01/25/22  Per chart review, last documented patient weight 194lbs, this is decrease of 6 lbs from last call. CTN was uanble to obtain any other information from daughter as she needed to end call. ---kimberley

## 2022-02-09 NOTE — PROGRESS NOTES
Goals Addressed                 This Visit's Progress     Attends follow-up appointments as directed. 12/20/21  CTN reviewed the following follow up appts recommeded at discharge    PCP----daughter reports that she is meeting with patient's [facility] MD today to see if she wants to switch PCP. CTN will followup for updates    Dr Valente Queen, cards--pt has appt with Nitza Norwood 12/29 at 2:30, daughter will take pt to appt    Dispatch health--per pt daughter,patient [facility] informed her that [facility] is not contracted to see patients there. She will have patient seen by [facility] wellness staff. Fermin Dodge PT--daughter states hospitalist wrote script to have pt seen at [facility] in their gym    Patient has nephrologist that he sees every 3 months, Dr Mick Campbell. Daughter will call the office to inform them of increase in bumex. Pt has an appt on 1/7/2022    [facility] of hospital d/c to see if they want pt to come in.    ---mkrw    CTN unable to reach pt daughterJEFF on VM. CTN did contact patient and the wellness center at patient residence. Patient states that he is no longer seeing Dr Matilde Manriquez and is seeing Eric Castillo NP at [facility]. CTN contacted the [facility], nurse states patient has appt to see NP Eric Castillo today. Patient reports he will continue to work with [facility] now that he has returned home however he states this may change once he has his pacer/AICD changed out tomorrow depending on activity orders. CTN will follow up with patient daughter regarding follow ups with Dr Valente Queen cards and Dr Mick Campbell, nephrology. Per chart, pt has appt for AICD replacement tomorrow at [facility]. Patient states he has home 02 from [facility] in his apt.---mkrw    01/25/22  CTN spoke to pt daughter Stehpie Meadows briefly.  She report patient saw Dr Mick Campbell and patient is going today to have US of kidneys performed to r/o blockage as patient has stopped urinating. Patient also had AICD battery changed on 1/18/22. Royal Barrett also stated that they are talking with hospice, CTN was unable to gather more information as daughter needed to end call to take patient to US appt at [facility]. CTN will follow up in 1 week. --mkrw    02/09/22  CTN spoke to Shweta Arnold, She reports patient is now is hospice with [facility]. CTN called [facility] , spoke to [place]. She confirmed that patient was started on 1/26/2022. CTN will follow up in 1 month. ---mkrw         COMPLETED: Understands red flags post discharge. 12/20/21  CTN spoke to patient daughter Royal Barrett to review discharge instructions/red flags     Daily weights, daughter reports she purchased a digital scale over the weekend and instructed pt to weigh every day in the morning. When CTN contacted pt he had not gotten OOB yet to weigh. Royal Barrett states she will go to his apt today to get baseline and record weight on chart. Last hospital weight 210lbs   Low sodium diet 1500 mg. Daughter states patient is aware of low sodium diet and knows what he should avoid   Medications: daughter endorses that she picked up new meds and incorporated them into his pill box. She states she fills the pill box weekly and that pt is compliant   Monitor VS and 02 sats---daughter states she will talk to facility to see if they can start taking pt vitals daily. Pt has a BP cuff but is unable to take BP himself. CTN will follow up next week for updates--mkrw    01/12/22  CTN reviewed chart prior to call attempt--patient is currently at [facility] ED. CTN will follow up tomorrow---mkrw    01/17/22  CTN spoke to patient for updates. He reports he is wearing 02 at 2L and used pulse ox to tell CTN his o2 sats \"are in the 90's and my HR is 80\". Patient reports that he does not feel SOB, does not have cough. Denies CP. Patient reports he is weighing himself and reports weight at 200 lbs. This is 10 lb decrease from call made previously to patient. He denies any edema or SOB. Patient reports his daughter takes care of his medicines, LM on daughter VM. Unable to perform med rec. CTN instructed patient to call his daughter or the Wellness center if he start getting swelling in his feet/legs, has more difficulty with breathing, CP. Patient states he will. CTN will follow up after pacemaker insertion discharge. --kimberley    01/25/22  Per chart review, last documented patient weight 194lbs, this is decrease of 6 lbs from last call. CTN was uanble to obtain any other information from daughter as she needed to end call. ---kimberley

## 2022-02-26 NOTE — ED NOTES
Contacted staff at [facility] to fax over advance directives/hospice paperwork for pt.

## 2022-02-26 NOTE — ED PROVIDER NOTES
Please note that this dictation was completed with [employer], the computer voice recognition software.  Quite often unanticipated grammatical, syntax, homophones, and other interpretive errors are inadvertently transcribed by the computer software.  Please disregard these errors.  Please excuse any errors that have escaped final proofreading. 47-year-old male past medical history [place] for A. fib, arthritis, right lung cancer skin cancer bladder cancer, chronic kidney disease, type 2 diabetes, gout, hypertension, hypothyroidism, neuropathy, on home oxygen, pacemaker, depression, and obstructive sleep apnea on CPAP presents the ER by EMS complaining of \" increased anal pain decreased bowel movements decreased urination. \"  Patient states for approximately the past 30 days \"increased anal pain. Patient states he has been straining to have bowel movements he has not had a good bowel movement in multiple weeks. He said he had small bowel movement yesterday and some days has small bowel movements was \"does not have a large satisfying bowel movement in quite a while. Patient states he has a sharp rectal pain anal pain when trying to have a bowel movement however does not try to have the bowel movements he does not have the pain. Patient states he is had decreased hunger\" just this does not feel like eating anymore. Patient states he last ate dinner yesterday has not had anything to eat today. Patient states he also has not \"urinated in several weeks. \"  Patient states he has been seen by somebody at his facility as well as a specialist and they told him that it is not possible for him not to urinate as if he were to stop urinating he would not leave but he is adamant that he has not been urinating. \"  He denies any testicular or penile pains. He denies any fevers or chills, he states he is on oxygen at the nursing home at baseline.   Patient states he was recently entered into hospice however they could not find the hospice paperwork to sign with him this evening we will call the nursing home to verify the hospice status. Patient denies being in any pain currently \"is just if I try to have a bowel movement. He denies any blood in the stool denies any vomiting. He denies any cough any shortness of breath. Denies any overt chest pains says he is vaccinated against COVID. Discussed with the patient will check lab work images abdomen he agrees with this plan. pt denies HA, vison changes, diff swallowing, CP, SOB, Abd pain, F/Ch, N/V, D/Cons or other current systemic complaints    Social/ PSH reviewed in EMR    EMR Chart Reviewed           Past Medical History:   Diagnosis Date    Arrhythmia     AFib    Arthritis     Atrial fibrillation [facility]     Cancer ([facility]     right lung, skin cancer, bladder (instilled medication in bladder for tx)    Chronic kidney disease     Diabetes ([facility]     NIDDM    Gout     Hypertension     Hypothyroidism     Ill-defined condition     neuropathy in feet    Other ill-defined conditions(799.89)     high cholesterol    Pacemaker     Psychiatric disorder     depression    Sleep apnea     uses CPAP       Past Surgical History:   Procedure Laterality Date    COLONOSCOPY N/A 8/8/2017    COLONOSCOPY performed by [facility] Ruby MD at [facility]    COLONOSCOPY N/A 2/22/2019    COLONOSCOPY performed by Any Alex MD at [facility]    ECHO 2D ADULT  9/2009    LVH, LAE, mild MR, EF 55-60%    HX CATARACT REMOVAL Bilateral     with lens implants    HX HEART CATHETERIZATION  2009    HX HEENT      tonsillectomy    HX KNEE ARTHROSCOPY Right     menisectomy    HX LUMBAR LAMINECTOMY      back surgery / hardware    HX OTHER SURGICAL  4/28/14    BRONCHOSCOPY/RIGHT VIDEO ASSISTED THORACOSCOPY,  2.RIGHT UPPER LOBE WEDGE RESECTION 3. COMPLETION RIGHT UPPER LOBECTOMY 4. MEDIASTINAL LYMPH NODE DISSECTION 5. INTERCOSTAL BLOCK    HX OTHER SURGICAL  4/28/14    Right Thoracoscopy, Ligation of inferior pulmonary ligament bleeding with prolene/clips    HX PACEMAKER Left     AICD--Dr. Lauren Ruggiero    HX UROLOGICAL      bladder tumor    ME CARDIAC SURG PROCEDURE UNLIST      ablation,  Pacer/defibralator    STRESS TEST CARDIOLITE  2010    exercise/lexiscan study - poor exercise capacity, normal perfusion, EF 62%    THORACOSCOPY SURG WEDG RESEC LUNG  2015    RUL         Family History:   Problem Relation Age of Onset    Cancer Mother         pancreatic    Cancer Father         colon    Cancer Sister         uterine    Cancer Brother         lung, bladder       Social History     Socioeconomic History    Marital status:      Spouse name: Not on file    Number of children: Not on file    Years of education: Not on file    Highest education level: Not on file   Occupational History    Not on file   Tobacco Use    Smoking status: Former Smoker     Packs/day: 2.50     Years: 25.00     Pack years: 62.50     Types: Cigarettes     Quit date: 1975     Years since quittin.7    Smokeless tobacco: Never Used    Tobacco comment: quit    Substance and Sexual Activity    Alcohol use: No     Alcohol/week: 0.0 standard drinks    Drug use: No    Sexual activity: Not on file   Other Topics Concern    Not on file   Social History Narrative    Not on file     Social Determinants of Health     Financial Resource Strain:     Difficulty of Paying Living Expenses: Not on file   Food Insecurity:     Worried About [address] in the Last Year: Not on file    [address] in the Last Year: Not on file   Transportation Needs:     Lack of Transportation (Medical): Not on file    Lack of Transportation (Non-Medical):  Not on file   Physical Activity:     Days of Exercise per Week: Not on file    Minutes of Exercise per Session: Not on file   Stress:     Feeling of Stress : Not on file   Social Connections:     Frequency of Communication with Friends and Family: Not on file    Frequency of Social Gatherings with Friends and Family: Not on file    Attends [employer] Services: Not on file    Active Member of Clubs or Organizations: Not on file    Attends Club or Organization Meetings: Not on file    Marital Status: Not on file   Intimate Partner Violence:     Fear of Current or Ex-Partner: Not on file    Emotionally Abused: Not on file    Physically Abused: Not on file    Sexually Abused: Not on file   Housing Stability:     Unable to Pay for Housing in the Last Year: Not on file    Number of [place] in the Last Year: Not on file    Unstable Housing in the Last Year: Not on file         ALLERGIES: Patient has no known allergies. Review of Systems   Unable to perform ROS: Mental status change   All other systems reviewed and are negative. There were no vitals filed for this visit. Physical Exam  Vitals and nursing note reviewed. Constitutional:       General: He is not in acute distress. Appearance: Normal appearance. He is well-developed. He is obese. He is not ill-appearing, toxic-appearing or diaphoretic. Comments: NAD, AxOx3 (unknown JOAQUIN/ correct month/ year), speaking in complete sentences    On NC, no complaints curently       HENT:      Head: Normocephalic and atraumatic. Right Ear: External ear normal.      Left Ear: External ear normal.      Mouth/Throat:      Pharynx: No oropharyngeal exudate. Eyes:      General: No scleral icterus. Right eye: No discharge. Left eye: No discharge. Extraocular Movements: Extraocular movements intact. Conjunctiva/sclera: Conjunctivae normal.      Pupils: Pupils are equal, round, and reactive to light. Cardiovascular:      Rate and Rhythm: Normal rate and regular rhythm. Pulses: Normal pulses. Heart sounds: Normal heart sounds. No murmur heard. No friction rub. No gallop.     Pulmonary:      Effort: Pulmonary effort is normal. No respiratory distress. Breath sounds: Normal breath sounds. No stridor. No wheezing, rhonchi or rales. Chest:      Chest wall: No tenderness. Abdominal:      General: Bowel sounds are normal. There is no distension. Palpations: Abdomen is soft. There is no mass. Tenderness: There is no abdominal tenderness. There is no guarding or rebound. Hernia: No hernia is present. Genitourinary:     Comments: Pt denies  Testicular/ scrotal or penile  complaints  Musculoskeletal:         General: No tenderness, deformity or signs of injury. Normal range of motion. Cervical back: Normal range of motion and neck supple. Right lower leg: No edema. Left lower leg: No edema. Lymphadenopathy:      Cervical: No cervical adenopathy. Skin:     General: Skin is warm and dry. Capillary Refill: Capillary refill takes less than 2 seconds. Findings: No bruising, erythema, lesion or rash. Neurological:      General: No focal deficit present. Mental Status: He is alert. Mental status is at baseline. Cranial Nerves: No cranial nerve deficit. Sensory: No sensory deficit. Motor: No weakness. Coordination: Coordination normal.      Gait: Gait normal.      Deep Tendon Reflexes: Reflexes normal.          MDM       Procedures    No chief complaint on file. 5:33 PM  The patients presenting problems have been discussed, and they are in agreement with the care plan formulated and outlined with them. I have encouraged them to ask questions as they arise throughout their visit. MEDICATIONS GIVEN:  Medications - No data to display    LABS REVIEWED:  Labs Reviewed - No data to display    RADIOLOGY RESULTS:  The following have been ordered and reviewed:  _____________________________________________________________________  _____________________________________________________________________    EKG interpretation:   Rhythm: paced rhythm; and regular .  Rate (approx.): 94; Axis: normal; P wave: normal; QRS interval: normal ; ST/T wave: normal; Negative acute significant segmental elevations/ unchanged compared to study dated 01/12/2022    PROCEDURES:        CONSULTATIONS:       PROGRESS NOTES:      DIAGNOSIS:    1. Rectal pain    2. Congestive heart failure, unspecified HF chronicity, unspecified heart failure type ([facility] [address]    3. DNR (do not resuscitate)    4. On home oxygen therapy        PLAN:  1-rectal pain - no impaction/ fissure or hemrrhoids;   2 chf - given bumex;   3 dementia - baseline;       ED COURSE: The patients hospital course has been uncomplicated. 6:19 PM  Notified by nursing that they had contacted patient's daughter successfully, she is for his POA, patient is to be considered DNR is in hospice currently. She states she has been urinating has been having small bowel movements however agrees with her plans for further evaluation. Procedure Note - Rectal Exam:   7:55 PM  Performed by: Maxx Quevedo MD  Chaperoned by: Nurse  Rectal exam performed. brn stool was collected. Stool was collected and sent to the lab for Hemoccult testing. Other findings: no hemorrhoids/ impaction or fissure noted; The procedure took 1-15 minutes, and pt tolerated well.    7:59 PM  Mckenzieshireen Sierra Jr.'s  results have been reviewed with him. He has been counseled regarding his diagnosis. He verbally conveys understanding and agreement of the signs, symptoms, diagnosis, treatment and prognosis and additionally agrees to Call/ Arrange follow up as recommended with Dr. Cristine Campos NP in 24 - 48 hours. He also agrees with the care-plan and conveys that all of his questions have been answered.   I have also put together some discharge instructions for him that include: 1) educational information regarding their diagnosis, 2) how to care for their diagnosis at home, as well a 3) list of reasons why they would want to return to the ED prior to their follow-up appointment, should their condition change or for concerns.

## 2022-02-26 NOTE — ED TRIAGE NOTES
Pt reports rectal pain, constipation. Hx of lung cancer and bladder cancer. Pt reports LBM was over 2 weeks ago. Pt also reports that he is having difficulty urinating.

## 2022-02-27 NOTE — ED NOTES
[place] charge nurse Naomi contacted at this time to give report and inform of patient being discharged and returning home. Patient pending transportation from hospital to home at this time. Jung Pepper from [facility] also contacted at this time but no answer and mailbox has not been set up for voicemail.

## 2022-02-27 NOTE — DISCHARGE INSTRUCTIONS
Thank you for allowing us to provide you with medical care today. We realize that you have many choices for your emergency care needs. We thank you for choosing [facility]. Please choose us in the future for any continued health care needs. We hope we addressed all of your medical concerns. We strive to provide excellent quality care in the Emergency Department. Anything less than excellent does not meet our expectations. The exam and treatment you received in the Emergency Department were for an emergent problem and are not intended as complete care. It is important that you follow up with a doctor, nurse practitioner, or physician's assistant for ongoing care. If your symptoms worsen or you do not improve as expected and you are unable to reach your usual health care provider, you should return to the Emergency Department. We are available 24 hours a day. Take this sheet with you when you go to your follow-up visit. If you have any problem arranging the follow-up visit, contact the Emergency Department immediately. Make an appointment your family doctor for follow up of this visit. Return to the ER if you are unable to be seen in a timely manner.

## 2022-02-27 NOTE — ED NOTES
Weekend RN, Chin December with [facility] asked to be notified of pt's disposition.  [phone]

## 2022-02-27 NOTE — ED NOTES
Patient is discharged home to [facility] with hospital to home staff. Alert, oriented, and in stretcher. Patient is in no distress. [facility] hospice nurse called and updated.

## 2022-03-17 ENCOUNTER — PATIENT OUTREACH (OUTPATIENT)
Dept: CASE MANAGEMENT | Age: 87
End: 2022-03-17

## 2022-03-17 NOTE — PROGRESS NOTES
Patient has graduated from the Bundle program on 3/17/2022. Patient passed away on 3/2/2022 while under Hospice care with [facility]. Goals Addressed                 This Visit's Progress     COMPLETED: Attends follow-up appointments as directed. 12/20/21  CTN reviewed the following follow up appts recommeded at discharge    PCP----daughter reports that she is meeting with patient's [facility] MD today to see if she wants to switch PCP. CTN will followup for updates    Dr Faiza Flores, zaida--pt has appt with Dannielle Bhat 12/29 at 2:30, daughter will take pt to appt    [facility],patient [facility] informed her that [facility] is not contracted to see patients there. She will have patient seen by [facility] wellness staff. Baltazar Urenans PT--[facility] states hospitalist wrote script to have pt seen at [facility] in their gym    Patient has nephrologist that he sees every 3 months, Dr Kaylee Brar. Daughter will call the office to inform them of increase in bumex. Pt has an appt on 1/7/2022    Patient also sees Dr Nilesh Mcdermott for [facility] will notify office of hospital d/c to see if they want pt to come in.    ---mkrw    CTN unable to reach pt daughterJEFF on VM. CTN did contact patient and the wellness center at patient residence. Patient states that he is no longer seeing Dr Marquis Mandujano and is seeing Keke Ramsey NP at [facility]. CTN contacted the [facility], nurse states patient has appt to see NP Keke Ramsey today. Patient reports he will continue to work with Yong Drummond now that he has returned home however he states this may change once he has his pacer/AICD changed out tomorrow depending on activity orders. CTN will follow up with patient daughter regarding follow ups with Dr Faiza cerda and Dr Kaylee Brar, nephrology. Per chart, pt has appt for AICD replacement tomorrow at [facility].     Patient states he has home 02 from [facility] in his apt.---mkrw    01/25/22  CTN spoke to pt daughter Bryan Birch briefly. She report patient saw Dr Kasie Malone and patient is going today to have US of kidneys performed to r/o blockage as patient has stopped urinating. Patient also had AICD battery changed on 1/18/22. Bryan Birch also stated that they are talking with hospice, CTN was unable to gather more information as daughter needed to end call to take patient to US appt at [facility]. CTN will follow up in 1 week. --rw    02/09/22  CTN spoke to Ashly Pop, She reports patient is now is hospice with [facility]. CTN called [facility] , spoke to [place]. She confirmed that patient was started on 1/26/2022. CTN will follow up in 1 month. ---mkrw    03/17/22  CTN contacted [facility], per [place], patient passed away on 3/2/22 while under hospice services---rw              Patient has Care Transition Nurse's contact information for any further questions, concerns, or needs. Patients upcoming visits:  No future appointments.

## 2024-12-10 NOTE — PROGRESS NOTES
Transitions of Care Plan   RUR: 24% - high   Clinical Update: oxygen challenge; therapy   Consults:    Baseline: resides at [address] [facility] Barriers to Discharge: oxygen set up; [facility] set up   Disposition: [facility] w/ oxygen   Estimated Discharge Date: 1/14/22    CM updated by attending MD - plans on discharging patient this afternoon - home on home oxygen. Chart reviewed - therapy recommends HH. CM called DON at [facility]: [phone] f: [phone]) and left VM requesting name of preferred [facility] agency at [facility]. Also advised patient will discharge on oxygen. Provided DON with callback number. CM provided update to attending MD.    [phone] - Order for home oxygen with supporting clinicals sent to [facility] via [employer].     Franko Post, MPH  Care Manager [facility]  Available via [address] [place] [address] or   Opt out

## (undated) DEVICE — Device

## (undated) DEVICE — NEEDLE HYPO 18GA L1.5IN PNK S STL HUB POLYPR SHLD REG BVL

## (undated) DEVICE — Device: Brand: MEDEX

## (undated) DEVICE — SET ADMIN 16ML TBNG L100IN 2 Y INJ SITE IV PIGGY BK DISP

## (undated) DEVICE — KENDALL RADIOLUCENT FOAM MONITORING ELECTRODE RECTANGULAR SHAPE: Brand: KENDALL

## (undated) DEVICE — Z DISCONTINUED PER MEDLINE LINE GAS SAMPLING O2/CO2 LNG AD 13 FT NSL W/ TBNG FILTERLINE

## (undated) DEVICE — SOLIDIFIER MEDC 1200ML -- CONVERT TO 356117

## (undated) DEVICE — NEONATAL-ADULT SPO2 SENSOR: Brand: NELLCOR

## (undated) DEVICE — CATH IV AUTOGRD BC BLU 22GA 25 -- INSYTE

## (undated) DEVICE — SNARE ENDOSCP M L240CM W27MM SHTH DIA2.4MM CHN 2.8MM OVL

## (undated) DEVICE — SYR 3ML LL TIP 1/10ML GRAD --

## (undated) DEVICE — NON-REM POLYHESIVE PATIENT RETURN ELECTRODE: Brand: VALLEYLAB

## (undated) DEVICE — MEDI-VAC YANK SUCT HNDL W/TPRD BULBOUS TIP: Brand: CARDINAL HEALTH

## (undated) DEVICE — STRAINER URIN CALC RNL MSH -- CONVERT TO ITEM 357634

## (undated) DEVICE — BASIN EMESIS 500CC ROSE 250/CS 60/PLT: Brand: MEDEGEN MEDICAL PRODUCTS, LLC

## (undated) DEVICE — (D)SYR 10ML 1/5ML GRAD NSAF -- PKGING CHANGE USE ITEM 338027

## (undated) DEVICE — CONTAINER SPEC 20 ML LID NEUT BUFF FORMALIN 10 % POLYPR STS

## (undated) DEVICE — TRAP SUC MUCOUS 70ML -- MEDICHOICE MEDLINE

## (undated) DEVICE — 1200 GUARD II KIT W/5MM TUBE W/O VAC TUBE: Brand: GUARDIAN

## (undated) DEVICE — SUTURE COAT VCRL SZ 4-0 L18IN ABSRB UD L19MM PS-2 1/2 CIR J496G

## (undated) DEVICE — ADHESIVE TISS CLOSURE 22X4 CM 4 CC HI VISC EXOFIN

## (undated) DEVICE — IMPLANTABLE DEVICE
Type: IMPLANTABLE DEVICE | Status: NON-FUNCTIONAL
Removed: 2022-01-18

## (undated) DEVICE — NEEDLE ASPIR 22GA MIN WRK CHAN 2.4MM SHTH DIA1.65MM STD HNDL

## (undated) DEVICE — CUFF ADULT 1 PC 1 VINYL DISP --

## (undated) DEVICE — MEDI-TRACE CADENCE ADULT, DEFIBRILLATION ELECTRODE -RTS  (10 PR/PK) - PHYSIO-CONTROL: Brand: MEDI-TRACE CADENCE

## (undated) DEVICE — TRAY,IRRIGATION,PISTON SYRINGE,60ML,STRL: Brand: MEDLINE

## (undated) DEVICE — CATH IV AUTOGRD BC PNK 20GA 25 -- INSYTE

## (undated) DEVICE — SYR 10ML LUER LOK 1/5ML GRAD --

## (undated) DEVICE — Device: Brand: BALLOON3

## (undated) DEVICE — REM POLYHESIVE ADULT PATIENT RETURN ELECTRODE: Brand: VALLEYLAB

## (undated) DEVICE — SUTURE VCRL 2-0 L27IN ABSRB UD PS-2 L19MM 1/2 CIR J428H

## (undated) DEVICE — BLOCK BITE ENDOSCP AD 21 MM W/ DIL BLU LF DISP

## (undated) DEVICE — BASIN EMSIS 16OZ GRAPHITE PLAS KID SHP MOLD GRAD FOR ORAL

## (undated) DEVICE — PACK PROC PACEMAKER  LF

## (undated) DEVICE — SUT SLK 0 30IN SH BLK --

## (undated) DEVICE — TOWEL 4 PLY TISS 19X30 SUE WHT

## (undated) DEVICE — 3M™ IOBAN™ 2 ANTIMICROBIAL INCISE DRAPE 6650EZ: Brand: IOBAN™ 2

## (undated) DEVICE — PLASMABLADE PS200-040 4.0: Brand: PLASMABLADE™

## (undated) DEVICE — Device: Brand: SINGLE USE ASPIRATION NEEDLE